# Patient Record
Sex: MALE | Race: OTHER | HISPANIC OR LATINO | URBAN - METROPOLITAN AREA
[De-identification: names, ages, dates, MRNs, and addresses within clinical notes are randomized per-mention and may not be internally consistent; named-entity substitution may affect disease eponyms.]

---

## 2023-06-26 ENCOUNTER — INPATIENT (INPATIENT)
Facility: HOSPITAL | Age: 73
LOS: 20 days | Discharge: HOME | DRG: 216 | End: 2023-07-17
Attending: INTERNAL MEDICINE | Admitting: INTERNAL MEDICINE
Payer: MEDICAID

## 2023-06-26 VITALS
HEIGHT: 61 IN | TEMPERATURE: 99 F | RESPIRATION RATE: 18 BRPM | SYSTOLIC BLOOD PRESSURE: 186 MMHG | DIASTOLIC BLOOD PRESSURE: 103 MMHG | WEIGHT: 132.06 LBS | HEART RATE: 96 BPM | OXYGEN SATURATION: 98 %

## 2023-06-26 DIAGNOSIS — I50.9 HEART FAILURE, UNSPECIFIED: ICD-10-CM

## 2023-06-26 LAB
ALBUMIN SERPL ELPH-MCNC: 3.8 G/DL — SIGNIFICANT CHANGE UP (ref 3.3–5)
ALP SERPL-CCNC: 104 U/L — SIGNIFICANT CHANGE UP (ref 40–120)
ALT FLD-CCNC: 20 U/L — SIGNIFICANT CHANGE UP (ref 10–45)
ANION GAP SERPL CALC-SCNC: 17 MMOL/L — SIGNIFICANT CHANGE UP (ref 5–17)
APTT BLD: 27.2 SEC — LOW (ref 27.5–35.5)
AST SERPL-CCNC: 17 U/L — SIGNIFICANT CHANGE UP (ref 10–40)
BASE EXCESS BLDV CALC-SCNC: 2 MMOL/L — SIGNIFICANT CHANGE UP (ref -2–3)
BASOPHILS # BLD AUTO: 0.04 K/UL — SIGNIFICANT CHANGE UP (ref 0–0.2)
BASOPHILS NFR BLD AUTO: 0.3 % — SIGNIFICANT CHANGE UP (ref 0–2)
BILIRUB SERPL-MCNC: 0.6 MG/DL — SIGNIFICANT CHANGE UP (ref 0.2–1.2)
BUN SERPL-MCNC: 15 MG/DL — SIGNIFICANT CHANGE UP (ref 7–23)
CA-I SERPL-SCNC: 1.22 MMOL/L — SIGNIFICANT CHANGE UP (ref 1.15–1.33)
CALCIUM SERPL-MCNC: 9.8 MG/DL — SIGNIFICANT CHANGE UP (ref 8.4–10.5)
CHLORIDE BLDV-SCNC: 99 MMOL/L — SIGNIFICANT CHANGE UP (ref 96–108)
CHLORIDE SERPL-SCNC: 94 MMOL/L — LOW (ref 96–108)
CO2 BLDV-SCNC: 29 MMOL/L — HIGH (ref 22–26)
CO2 SERPL-SCNC: 24 MMOL/L — SIGNIFICANT CHANGE UP (ref 22–31)
CREAT SERPL-MCNC: 0.73 MG/DL — SIGNIFICANT CHANGE UP (ref 0.5–1.3)
EGFR: 95 ML/MIN/1.73M2 — SIGNIFICANT CHANGE UP
EOSINOPHIL # BLD AUTO: 0.01 K/UL — SIGNIFICANT CHANGE UP (ref 0–0.5)
EOSINOPHIL NFR BLD AUTO: 0.1 % — SIGNIFICANT CHANGE UP (ref 0–6)
GAS PNL BLDV: 131 MMOL/L — LOW (ref 136–145)
GAS PNL BLDV: SIGNIFICANT CHANGE UP
GLUCOSE BLDC GLUCOMTR-MCNC: 241 MG/DL — HIGH (ref 70–99)
GLUCOSE BLDV-MCNC: 409 MG/DL — HIGH (ref 70–99)
GLUCOSE SERPL-MCNC: 385 MG/DL — HIGH (ref 70–99)
HCO3 BLDV-SCNC: 27 MMOL/L — SIGNIFICANT CHANGE UP (ref 22–29)
HCT VFR BLD CALC: 44.7 % — SIGNIFICANT CHANGE UP (ref 39–50)
HCT VFR BLDA CALC: 46 % — SIGNIFICANT CHANGE UP (ref 39–51)
HGB BLD CALC-MCNC: 15.2 G/DL — SIGNIFICANT CHANGE UP (ref 12.6–17.4)
HGB BLD-MCNC: 15.2 G/DL — SIGNIFICANT CHANGE UP (ref 13–17)
IMM GRANULOCYTES NFR BLD AUTO: 0.9 % — SIGNIFICANT CHANGE UP (ref 0–0.9)
INR BLD: 1.12 RATIO — SIGNIFICANT CHANGE UP (ref 0.88–1.16)
LACTATE BLDV-MCNC: 2.4 MMOL/L — HIGH (ref 0.5–2)
LYMPHOCYTES # BLD AUTO: 1.31 K/UL — SIGNIFICANT CHANGE UP (ref 1–3.3)
LYMPHOCYTES # BLD AUTO: 11.4 % — LOW (ref 13–44)
MCHC RBC-ENTMCNC: 30.6 PG — SIGNIFICANT CHANGE UP (ref 27–34)
MCHC RBC-ENTMCNC: 34 GM/DL — SIGNIFICANT CHANGE UP (ref 32–36)
MCV RBC AUTO: 89.9 FL — SIGNIFICANT CHANGE UP (ref 80–100)
MONOCYTES # BLD AUTO: 1.27 K/UL — HIGH (ref 0–0.9)
MONOCYTES NFR BLD AUTO: 11 % — SIGNIFICANT CHANGE UP (ref 2–14)
NEUTROPHILS # BLD AUTO: 8.77 K/UL — HIGH (ref 1.8–7.4)
NEUTROPHILS NFR BLD AUTO: 76.3 % — SIGNIFICANT CHANGE UP (ref 43–77)
NRBC # BLD: 0 /100 WBCS — SIGNIFICANT CHANGE UP (ref 0–0)
NT-PROBNP SERPL-SCNC: 2643 PG/ML — HIGH (ref 0–300)
PCO2 BLDV: 44 MMHG — SIGNIFICANT CHANGE UP (ref 42–55)
PH BLDV: 7.4 — SIGNIFICANT CHANGE UP (ref 7.32–7.43)
PLATELET # BLD AUTO: 292 K/UL — SIGNIFICANT CHANGE UP (ref 150–400)
PO2 BLDV: 35 MMHG — SIGNIFICANT CHANGE UP (ref 25–45)
POTASSIUM BLDV-SCNC: 4 MMOL/L — SIGNIFICANT CHANGE UP (ref 3.5–5.1)
POTASSIUM SERPL-MCNC: 4.2 MMOL/L — SIGNIFICANT CHANGE UP (ref 3.5–5.3)
POTASSIUM SERPL-SCNC: 4.2 MMOL/L — SIGNIFICANT CHANGE UP (ref 3.5–5.3)
PROT SERPL-MCNC: 8 G/DL — SIGNIFICANT CHANGE UP (ref 6–8.3)
PROTHROM AB SERPL-ACNC: 13 SEC — SIGNIFICANT CHANGE UP (ref 10.5–13.4)
RAPID RVP RESULT: SIGNIFICANT CHANGE UP
RBC # BLD: 4.97 M/UL — SIGNIFICANT CHANGE UP (ref 4.2–5.8)
RBC # FLD: 12.8 % — SIGNIFICANT CHANGE UP (ref 10.3–14.5)
SAO2 % BLDV: 59 % — LOW (ref 67–88)
SARS-COV-2 RNA SPEC QL NAA+PROBE: SIGNIFICANT CHANGE UP
SODIUM SERPL-SCNC: 135 MMOL/L — SIGNIFICANT CHANGE UP (ref 135–145)
TROPONIN T, HIGH SENSITIVITY RESULT: 21 NG/L — SIGNIFICANT CHANGE UP (ref 0–51)
TROPONIN T, HIGH SENSITIVITY RESULT: 22 NG/L — SIGNIFICANT CHANGE UP (ref 0–51)
WBC # BLD: 11.5 K/UL — HIGH (ref 3.8–10.5)
WBC # FLD AUTO: 11.5 K/UL — HIGH (ref 3.8–10.5)

## 2023-06-26 PROCEDURE — 99285 EMERGENCY DEPT VISIT HI MDM: CPT

## 2023-06-26 PROCEDURE — 93308 TTE F-UP OR LMTD: CPT | Mod: 26

## 2023-06-26 PROCEDURE — 71045 X-RAY EXAM CHEST 1 VIEW: CPT | Mod: 26

## 2023-06-26 PROCEDURE — 99223 1ST HOSP IP/OBS HIGH 75: CPT | Mod: GC

## 2023-06-26 RX ORDER — INSULIN LISPRO 100/ML
VIAL (ML) SUBCUTANEOUS
Refills: 0 | Status: DISCONTINUED | OUTPATIENT
Start: 2023-06-26 | End: 2023-06-28

## 2023-06-26 RX ORDER — DEXTROSE 50 % IN WATER 50 %
12.5 SYRINGE (ML) INTRAVENOUS ONCE
Refills: 0 | Status: DISCONTINUED | OUTPATIENT
Start: 2023-06-26 | End: 2023-07-17

## 2023-06-26 RX ORDER — ALBUTEROL 90 UG/1
1.25 AEROSOL, METERED ORAL EVERY 6 HOURS
Refills: 0 | Status: DISCONTINUED | OUTPATIENT
Start: 2023-06-26 | End: 2023-06-27

## 2023-06-26 RX ORDER — AZITHROMYCIN 500 MG/1
500 TABLET, FILM COATED ORAL EVERY 24 HOURS
Refills: 0 | Status: DISCONTINUED | OUTPATIENT
Start: 2023-06-27 | End: 2023-06-28

## 2023-06-26 RX ORDER — AMLODIPINE BESYLATE 2.5 MG/1
2.5 TABLET ORAL DAILY
Refills: 0 | Status: DISCONTINUED | OUTPATIENT
Start: 2023-06-26 | End: 2023-06-27

## 2023-06-26 RX ORDER — LOSARTAN POTASSIUM 100 MG/1
1 TABLET, FILM COATED ORAL
Refills: 0 | DISCHARGE

## 2023-06-26 RX ORDER — CEFTRIAXONE 500 MG/1
1000 INJECTION, POWDER, FOR SOLUTION INTRAMUSCULAR; INTRAVENOUS EVERY 24 HOURS
Refills: 0 | Status: COMPLETED | OUTPATIENT
Start: 2023-06-26 | End: 2023-06-30

## 2023-06-26 RX ORDER — ASPIRIN/CALCIUM CARB/MAGNESIUM 324 MG
81 TABLET ORAL DAILY
Refills: 0 | Status: DISCONTINUED | OUTPATIENT
Start: 2023-06-26 | End: 2023-07-17

## 2023-06-26 RX ORDER — SODIUM CHLORIDE 9 MG/ML
1000 INJECTION, SOLUTION INTRAVENOUS
Refills: 0 | Status: DISCONTINUED | OUTPATIENT
Start: 2023-06-26 | End: 2023-07-17

## 2023-06-26 RX ORDER — AZITHROMYCIN 500 MG/1
TABLET, FILM COATED ORAL
Refills: 0 | Status: DISCONTINUED | OUTPATIENT
Start: 2023-06-26 | End: 2023-06-28

## 2023-06-26 RX ORDER — GLUCAGON INJECTION, SOLUTION 0.5 MG/.1ML
1 INJECTION, SOLUTION SUBCUTANEOUS ONCE
Refills: 0 | Status: DISCONTINUED | OUTPATIENT
Start: 2023-06-26 | End: 2023-07-17

## 2023-06-26 RX ORDER — METOPROLOL TARTRATE 50 MG
25 TABLET ORAL
Refills: 0 | Status: DISCONTINUED | OUTPATIENT
Start: 2023-06-26 | End: 2023-06-29

## 2023-06-26 RX ORDER — DEXTROSE 50 % IN WATER 50 %
15 SYRINGE (ML) INTRAVENOUS ONCE
Refills: 0 | Status: DISCONTINUED | OUTPATIENT
Start: 2023-06-26 | End: 2023-07-17

## 2023-06-26 RX ORDER — LOSARTAN POTASSIUM 100 MG/1
50 TABLET, FILM COATED ORAL DAILY
Refills: 0 | Status: DISCONTINUED | OUTPATIENT
Start: 2023-06-26 | End: 2023-06-27

## 2023-06-26 RX ORDER — AZITHROMYCIN 500 MG/1
500 TABLET, FILM COATED ORAL ONCE
Refills: 0 | Status: COMPLETED | OUTPATIENT
Start: 2023-06-26 | End: 2023-06-26

## 2023-06-26 RX ORDER — DEXTROSE 50 % IN WATER 50 %
25 SYRINGE (ML) INTRAVENOUS ONCE
Refills: 0 | Status: DISCONTINUED | OUTPATIENT
Start: 2023-06-26 | End: 2023-07-17

## 2023-06-26 RX ORDER — HEPARIN SODIUM 5000 [USP'U]/ML
5000 INJECTION INTRAVENOUS; SUBCUTANEOUS EVERY 12 HOURS
Refills: 0 | Status: DISCONTINUED | OUTPATIENT
Start: 2023-06-26 | End: 2023-07-17

## 2023-06-26 RX ADMIN — AZITHROMYCIN 255 MILLIGRAM(S): 500 TABLET, FILM COATED ORAL at 22:20

## 2023-06-26 RX ADMIN — LOSARTAN POTASSIUM 50 MILLIGRAM(S): 100 TABLET, FILM COATED ORAL at 18:56

## 2023-06-26 NOTE — CONSULT NOTE ADULT - SUBJECTIVE AND OBJECTIVE BOX
Patient is a 73y old  Male who presents with a chief complaint of     HPI:      PAST MEDICAL & SURGICAL HISTORY:    SOCIAL HISTORY:  Allergies    No Known Allergies    Intolerances        HOME MEDICATIONS:      Vital Signs Last 24 Hrs  T(C): 37.2 (26 Jun 2023 11:39), Max: 37.2 (26 Jun 2023 11:39)  T(F): 98.9 (26 Jun 2023 11:39), Max: 98.9 (26 Jun 2023 11:39)  HR: 102 (26 Jun 2023 11:39) (96 - 102)  BP: 192/99 (26 Jun 2023 11:39) (186/103 - 192/99)  BP(mean): --  RR: 20 (26 Jun 2023 11:39) (18 - 20)  SpO2: 96% (26 Jun 2023 11:39) (96% - 98%)    Parameters below as of 26 Jun 2023 11:39  Patient On (Oxygen Delivery Method): room air      I&O's Summary      PHYSICAL EXAM:  GENERAL: NAD, well-developed  HEAD:  Atraumatic, Normocephalic  EYES: EOMI, conjunctiva and sclera clear  NECK: Supple, No JVD  CHEST/LUNG: Clear to auscultation bilaterally; No wheeze  HEART: Regular rate and rhythm; No murmurs, rubs, or gallops  ABDOMEN: Soft, Nontender, Nondistended; Bowel sounds present  EXTREMITIES:  2+ Peripheral Pulses, No clubbing, cyanosis, or edema  PSYCH: AAOx3  NEUROLOGY: non-focal  SKIN: No rashes or lesions    LABS                        15.2   11.50 )-----------( 292      ( 26 Jun 2023 12:00 )             44.7     06-26    135  |  94<L>  |  15  ----------------------------<  385<H>  4.2   |  24  |  0.73    Ca    9.8      26 Jun 2023 12:00    TPro  8.0  /  Alb  3.8  /  TBili  0.6  /  DBili  x   /  AST  17  /  ALT  20  /  AlkPhos  104  06-26    CAPILLARY BLOOD GLUCOSE        PT/INR - ( 26 Jun 2023 12:00 )   PT: 13.0 sec;   INR: 1.12 ratio         PTT - ( 26 Jun 2023 12:00 )  PTT:27.2 sec          06-26-23 @ 12:00 - VBG - pH: 7.40  | pCO2: 44    | pO2: 35    | Lactate: 2.4        Urinalysis Basic - ( 26 Jun 2023 12:00 )    Color: x / Appearance: x / SG: x / pH: x  Gluc: 385 mg/dL / Ketone: x  / Bili: x / Urobili: x   Blood: x / Protein: x / Nitrite: x   Leuk Esterase: x / RBC: x / WBC x   Sq Epi: x / Non Sq Epi: x / Bacteria: x      MEDICATIONS  (STANDING):    MEDICATIONS  (PRN):      ECG:  ECHO FINDINGS:  RADIOLOGY & ADDITIONAL STUDIES:     Patient is a 73y old  Male who presents with a chief complaint of     HPI:    In the ED:   HR 96, /103 satting 98% Ra  Frequent cough   11.5 < 15.2 > 292 INR 1.12  135/4.2 | 94/24 | 15/0.73 < 385  17/20 | 104 | 0.6 | 8/3.8    ProBNP 2643 trop 21    Cardiology consulted for ST changes c/w LVH in the absence of chest pain.    PAST MEDICAL & SURGICAL HISTORY:    SOCIAL HISTORY:  Allergies  No Known Allergies  Intolerances    HOME MEDICATIONS:    Vital Signs Last 24 Hrs  T(C): 37.2 (26 Jun 2023 11:39), Max: 37.2 (26 Jun 2023 11:39)  T(F): 98.9 (26 Jun 2023 11:39), Max: 98.9 (26 Jun 2023 11:39)  HR: 102 (26 Jun 2023 11:39) (96 - 102)  BP: 192/99 (26 Jun 2023 11:39) (186/103 - 192/99)  BP(mean): --  RR: 20 (26 Jun 2023 11:39) (18 - 20)  SpO2: 96% (26 Jun 2023 11:39) (96% - 98%)    Parameters below as of 26 Jun 2023 11:39  Patient On (Oxygen Delivery Method): room air  I&O's Summary    PHYSICAL EXAM:  GENERAL: NAD, well-developed  HEAD:  Atraumatic, Normocephalic  EYES: EOMI, conjunctiva and sclera clear  NECK: Supple, No JVD  CHEST/LUNG: Clear to auscultation bilaterally; No wheeze  HEART: Regular rate and rhythm; No murmurs, rubs, or gallops  ABDOMEN: Soft, Nontender, Nondistended; Bowel sounds present  EXTREMITIES:  2+ Peripheral Pulses, No clubbing, cyanosis, or edema  PSYCH: AAOx3  NEUROLOGY: non-focal  SKIN: No rashes or lesions    LABS                        15.2   11.50 )-----------( 292      ( 26 Jun 2023 12:00 )             44.7     06-26    135  |  94<L>  |  15  ----------------------------<  385<H>  4.2   |  24  |  0.73    Ca    9.8      26 Jun 2023 12:00    TPro  8.0  /  Alb  3.8  /  TBili  0.6  /  DBili  x   /  AST  17  /  ALT  20  /  AlkPhos  104  06-26    CAPILLARY BLOOD GLUCOSE        PT/INR - ( 26 Jun 2023 12:00 )   PT: 13.0 sec;   INR: 1.12 ratio         PTT - ( 26 Jun 2023 12:00 )  PTT:27.2 sec          06-26-23 @ 12:00 - VBG - pH: 7.40  | pCO2: 44    | pO2: 35    | Lactate: 2.4        Urinalysis Basic - ( 26 Jun 2023 12:00 )    Color: x / Appearance: x / SG: x / pH: x  Gluc: 385 mg/dL / Ketone: x  / Bili: x / Urobili: x   Blood: x / Protein: x / Nitrite: x   Leuk Esterase: x / RBC: x / WBC x   Sq Epi: x / Non Sq Epi: x / Bacteria: x      MEDICATIONS  (STANDING):    MEDICATIONS  (PRN):    ECG: sinus rhythm, LVH by Sokolov Jung criteria TWI asymmetric inferolaterally   Patient is a 73y old  Male who presents with a chief complaint of     HPI: 73M PMH DM, HTN, CAD s/p 3 stents p/w cough productive of green sputum. Interview conducted at bedside with telephone . Pt says the cough started 2 weeks ago. He came from [place] 1 month ago. Has some SOB when coughing, feels like coughing more when laying supine. Denies fever, chills, CP, abdominal pain, urinary symptoms, diarrhea, weight gain or weight loss, LE edema.    In the ED:   HR 96, /103 satting 98% Ra  Frequent cough interrupting interview.  11.5 < 15.2 > 292 INR 1.12  135/4.2 | 94/24 | 15/0.73 < 385  17/20 | 104 | 0.6 | 8/3.8    ProBNP 2643 trop 21  POCUS performed w/ "No clinically significant pericardial effusion.  The heart was hypodynamic.  There was basal-mid anterolateral hypokinesis."    Cardiology consulted for ST changes c/w LVH in the absence of chest pain.    PAST MEDICAL & SURGICAL HISTORY: As above    SOCIAL HISTORY: Denies illicits     Allergies  No Known Allergies  Intolerances      REVIEW OF SYSTEMS:  CONSTITUTIONAL: No weakness, fevers or chills  EYES/ENT: No visual changes;  No dysphagia  NECK: No pain or stiffness  RESPIRATORY: No cough, wheezing, hemoptysis; No shortness of breath  CARDIOVASCULAR: No chest pain or palpitations; No lower extremity edema  GASTROINTESTINAL: No abdominal or epigastric pain. No nausea, vomiting, or hematemesis; No diarrhea or constipation. No melena or hematochezia.  BACK: No back pain  GENITOURINARY: No dysuria, frequency or hematuria  NEUROLOGICAL: No numbness or weakness  SKIN: No itching, burning, rashes, or lesions   All other review of systems is negative unless indicated above.      Vital Signs Last 24 Hrs  T(C): 37.2 (26 Jun 2023 11:39), Max: 37.2 (26 Jun 2023 11:39)  T(F): 98.9 (26 Jun 2023 11:39), Max: 98.9 (26 Jun 2023 11:39)  HR: 102 (26 Jun 2023 11:39) (96 - 102)  BP: 192/99 (26 Jun 2023 11:39) (186/103 - 192/99)  BP(mean): --  RR: 20 (26 Jun 2023 11:39) (18 - 20)  SpO2: 96% (26 Jun 2023 11:39) (96% - 98%)    Parameters below as of 26 Jun 2023 11:39  Patient On (Oxygen Delivery Method): room air  I&O's Summary    PHYSICAL EXAM:  GENERAL: NAD, well-developed  HEAD:  Atraumatic, Normocephalic  EYES: EOMI, conjunctiva and sclera clear  NECK: Supple, No JVD  CHEST/LUNG: Clear to auscultation bilaterally; No wheeze  HEART: Regular rate and rhythm; No murmurs, rubs, or gallops  ABDOMEN: Soft, Nontender, Nondistended; Bowel sounds present  EXTREMITIES:  2+ Peripheral Pulses, No clubbing, cyanosis, or edema  PSYCH: AAOx3  NEUROLOGY: non-focal  SKIN: No rashes or lesions    LABS                        15.2   11.50 )-----------( 292      ( 26 Jun 2023 12:00 )             44.7     06-26    135  |  94<L>  |  15  ----------------------------<  385<H>  4.2   |  24  |  0.73    Ca    9.8      26 Jun 2023 12:00    TPro  8.0  /  Alb  3.8  /  TBili  0.6  /  DBili  x   /  AST  17  /  ALT  20  /  AlkPhos  104  06-26    CAPILLARY BLOOD GLUCOSE        PT/INR - ( 26 Jun 2023 12:00 )   PT: 13.0 sec;   INR: 1.12 ratio         PTT - ( 26 Jun 2023 12:00 )  PTT:27.2 sec          06-26-23 @ 12:00 - VBG - pH: 7.40  | pCO2: 44    | pO2: 35    | Lactate: 2.4        Urinalysis Basic - ( 26 Jun 2023 12:00 )    Color: x / Appearance: x / SG: x / pH: x  Gluc: 385 mg/dL / Ketone: x  / Bili: x / Urobili: x   Blood: x / Protein: x / Nitrite: x   Leuk Esterase: x / RBC: x / WBC x   Sq Epi: x / Non Sq Epi: x / Bacteria: x      MEDICATIONS  (STANDING):    MEDICATIONS  (PRN):    ECG: sinus rhythm, LVH by Piedad UPon criteria TWI asymmetric inferolaterally

## 2023-06-26 NOTE — ED ADULT NURSE REASSESSMENT NOTE - NS ED NURSE REASSESS COMMENT FT1
Report received from RNGuevara. Upon assessment, pt sitting up in stretcher and speaking in full sentences, primarily Irish speaking, son @ bedside. Breathing spontaneously and unlabored, >95% RA. Pt updated on plan of care, admitted to tele, awaiting bed, RTM. Pt maintained on continuous cardiac monitor, NSR. Pt denies current pain or discomfort. Safety and comfort measures maintained- bed in lowest position, locked, and blanket given.

## 2023-06-26 NOTE — CONSULT NOTE ADULT - ASSESSMENT
73M with CAD s/p 3 stents, HTN, T2DM, admitted for pneumonia, cardiology consulted for ST depressions and LVH noted on EKG iso hypertensive urgency.    #ST Depressions  #LVH  #Hypertensive urgency  Pt is currently CP-free, negative trops, low suspicion for ACS at this time. EKG changes more likely explained by LVH iso hypertensive urgency. BP on admission 186/103  -     Recommendations not finalized until discussed with attending    Miky Vitale MD  PGY2 73M with CAD s/p 3 stents, HTN, T2DM, admitted for pneumonia, cardiology consulted for ST depressions and LVH noted on EKG.  This is not ACS.    #ST Depressions  #LVH  #Hypertensive urgency  Pt is currently CP-free, negative trops, low suspicion for ACS at this time. EKG changes more likely explained by LVH iso hypertensive urgency. BP on admission 186/103  - F/u Formal TTE   - Afterload reduction with losartan 50mg daily to start, uptitrate to goal SBP < 130/80  - A1c, lipid panel and TSH for risk stratification   - Management of probable bronchitis/PNA as per primary team

## 2023-06-26 NOTE — ED ADULT NURSE NOTE - NSFALLUNIVINTERV_ED_ALL_ED
Bed/Stretcher in lowest position, wheels locked, appropriate side rails in place/Call bell, personal items and telephone in reach/Instruct patient to call for assistance before getting out of bed/chair/stretcher/Non-slip footwear applied when patient is off stretcher/[place] to call system/Physically safe environment - no spills, clutter or unnecessary equipment/Purposeful proactive rounding/Room/bathroom lighting operational, light cord in reach

## 2023-06-26 NOTE — ED ADULT NURSE NOTE - ED STAT RN HANDOFF DETAILS
Report given to receiving change of shift RN Charity OQUENDO patient is in no acute distress. Patient vital signs stable, plan of care explained.

## 2023-06-26 NOTE — ED PROVIDER NOTE - CLINICAL SUMMARY MEDICAL DECISION MAKING FREE TEXT BOX
73M PMH DM, HTN, CAD s/p 3 stents p/w cough productive of green sputum. DDx includes but not limited to: PNA vs CHF. Plan: blood work, ECG, CXR. Will re-assess. 73M PMH DM, HTN, CAD s/p 3 stents p/w cough x2weeks now productive of green sputum. DDx includes but not limited to: PNA vs CHF. Plan: blood work, ECG, CXR. Will re-assess.    Dr. Araujo (Attending Physician)

## 2023-06-26 NOTE — ED PROVIDER NOTE - PROGRESS NOTE DETAILS
Mandie Swenson M.D. (Resident Physician): ED POCUS showed reduced EF. Trop and BNP elevated. Will admit. Mandie Swenson M.D. (Resident Physician): Discussed with cardiology and medicine. Will admit pt.

## 2023-06-26 NOTE — H&P ADULT - ASSESSMENT
73M with CAD s/p 3 stents, HTN, T2DM, admitted for pneumonia, cardiology consulted for ST depressions and LVH noted on EKG.      ST Depressions    Hypertensive urgency  Pt is currently CP-free, negative trops, low suspicion for ACS at this time. EKG changes more likely explained by LVH iso hypertensive urgency. BP on admission 186/103  - F/u  TTE   - Afterload reduction with losartan 50mg daily to start, uptitrate to goal SBP < 130/80 as per cards  - A1c, lipid panel and TSH   likely pna  bronchitis  abs   nebs  pulm eval   cards f/u   dm  fsg riss

## 2023-06-26 NOTE — H&P ADULT - HISTORY OF PRESENT ILLNESS
73M PMH DM, HTN, CAD s/p 3 stents p/w cough productive of green sputum.    Pt says the cough started 2 weeks ago. He came from [place] 1 month ago.   Has some SOB when coughing, feels like coughing more when laying supine. Denies fever, chills, CP, abdominal pain, urinary symptoms, diarrhea, weight gain or weight loss, LE edema.

## 2023-06-26 NOTE — H&P ADULT - NSHPLABSRESULTS_GEN_ALL_CORE
15.2   11.50 )-----------( 292      ( 26 Jun 2023 12:00 )             44.7       06-26    135  |  94<L>  |  15  ----------------------------<  385<H>  4.2   |  24  |  0.73    Ca    9.8      26 Jun 2023 12:00    TPro  8.0  /  Alb  3.8  /  TBili  0.6  /  DBili  x   /  AST  17  /  ALT  20  /  AlkPhos  104  06-26              Urinalysis Basic - ( 26 Jun 2023 12:00 )    Color: x / Appearance: x / SG: x / pH: x  Gluc: 385 mg/dL / Ketone: x  / Bili: x / Urobili: x   Blood: x / Protein: x / Nitrite: x   Leuk Esterase: x / RBC: x / WBC x   Sq Epi: x / Non Sq Epi: x / Bacteria: x        PT/INR - ( 26 Jun 2023 12:00 )   PT: 13.0 sec;   INR: 1.12 ratio         PTT - ( 26 Jun 2023 12:00 )  PTT:27.2 sec    Lactate Trend            CAPILLARY BLOOD GLUCOSE

## 2023-06-26 NOTE — ED PROVIDER NOTE - PHYSICAL EXAMINATION
PHYSICAL EXAM:  GENERAL: Sitting comfortable in bed, in no acute distress  HENMT: Atraumatic, moist mucous membranes  EYES: Clear bilaterally, PERRL, EOMs intact b/l  HEART: RRR, S1/S2, no murmur  RESPIRATORY: Clear to auscultation bilaterally, no wheezes/rhonchi/rales  ABDOMEN: Soft, nontender, nondistended  EXTREMITIES: No lower extremity edema  NEURO: A&Ox4  SKIN: Skin normal color for race, warm, dry and intact

## 2023-06-26 NOTE — ED PROVIDER NOTE - OBJECTIVE STATEMENT
73M PMH DM, HTN, CAD s/p 3 stents p/w cough productive of green sputum. Pt says the cough started 2 weeks ago. He came from [place] 1 month ago. Has some SOB when coughing. Denies fever, chills, CP, abdominal pain, urinary symptoms, diarrhea, LE edema.

## 2023-06-26 NOTE — ED ADULT NURSE NOTE - OBJECTIVE STATEMENT
72yo M aaox4 h/o DM and HTN, presents ambulatory to ED from home, pt is [place] speaking, RN offered translation services, pt refused, "OK" that primary RN translates,  as per pt 15days ago onset a productive cough ( greenish mucus) w/ associated sob, at this time Pt denies CP, HA, vision changes, n/v/d, fevers chills, abdominal pain, weakness, dizziness, URI symptoms Safety and comfort measures initiated- bed placed in lowest position and side rails raised. Pt oriented to call bell system.

## 2023-06-27 DIAGNOSIS — I16.0 HYPERTENSIVE URGENCY: ICD-10-CM

## 2023-06-27 DIAGNOSIS — J18.9 PNEUMONIA, UNSPECIFIED ORGANISM: ICD-10-CM

## 2023-06-27 LAB
A1C WITH ESTIMATED AVERAGE GLUCOSE RESULT: 10 % — HIGH (ref 4–5.6)
ANION GAP SERPL CALC-SCNC: 16 MMOL/L — SIGNIFICANT CHANGE UP (ref 5–17)
BUN SERPL-MCNC: 12 MG/DL — SIGNIFICANT CHANGE UP (ref 7–23)
CALCIUM SERPL-MCNC: 9.3 MG/DL — SIGNIFICANT CHANGE UP (ref 8.4–10.5)
CHLORIDE SERPL-SCNC: 94 MMOL/L — LOW (ref 96–108)
CHOLEST SERPL-MCNC: 183 MG/DL — SIGNIFICANT CHANGE UP
CO2 SERPL-SCNC: 24 MMOL/L — SIGNIFICANT CHANGE UP (ref 22–31)
CREAT SERPL-MCNC: 0.76 MG/DL — SIGNIFICANT CHANGE UP (ref 0.5–1.3)
EGFR: 94 ML/MIN/1.73M2 — SIGNIFICANT CHANGE UP
ESTIMATED AVERAGE GLUCOSE: 240 MG/DL — HIGH (ref 68–114)
GLUCOSE BLDC GLUCOMTR-MCNC: 131 MG/DL — HIGH (ref 70–99)
GLUCOSE BLDC GLUCOMTR-MCNC: 256 MG/DL — HIGH (ref 70–99)
GLUCOSE BLDC GLUCOMTR-MCNC: 261 MG/DL — HIGH (ref 70–99)
GLUCOSE BLDC GLUCOMTR-MCNC: 261 MG/DL — HIGH (ref 70–99)
GLUCOSE SERPL-MCNC: 281 MG/DL — HIGH (ref 70–99)
HCT VFR BLD CALC: 42.5 % — SIGNIFICANT CHANGE UP (ref 39–50)
HCV AB S/CO SERPL IA: 0.7 S/CO — SIGNIFICANT CHANGE UP (ref 0–0.99)
HCV AB SERPL-IMP: SIGNIFICANT CHANGE UP
HDLC SERPL-MCNC: 43 MG/DL — SIGNIFICANT CHANGE UP
HGB BLD-MCNC: 14.5 G/DL — SIGNIFICANT CHANGE UP (ref 13–17)
LIPID PNL WITH DIRECT LDL SERPL: 114 MG/DL — HIGH
MCHC RBC-ENTMCNC: 30.7 PG — SIGNIFICANT CHANGE UP (ref 27–34)
MCHC RBC-ENTMCNC: 34.1 GM/DL — SIGNIFICANT CHANGE UP (ref 32–36)
MCV RBC AUTO: 89.9 FL — SIGNIFICANT CHANGE UP (ref 80–100)
NON HDL CHOLESTEROL: 141 MG/DL — HIGH
NRBC # BLD: 0 /100 WBCS — SIGNIFICANT CHANGE UP (ref 0–0)
PLATELET # BLD AUTO: 288 K/UL — SIGNIFICANT CHANGE UP (ref 150–400)
POTASSIUM SERPL-MCNC: 4.1 MMOL/L — SIGNIFICANT CHANGE UP (ref 3.5–5.3)
POTASSIUM SERPL-SCNC: 4.1 MMOL/L — SIGNIFICANT CHANGE UP (ref 3.5–5.3)
PROCALCITONIN SERPL-MCNC: 0.09 NG/ML — SIGNIFICANT CHANGE UP (ref 0.02–0.1)
RBC # BLD: 4.73 M/UL — SIGNIFICANT CHANGE UP (ref 4.2–5.8)
RBC # FLD: 12.9 % — SIGNIFICANT CHANGE UP (ref 10.3–14.5)
SODIUM SERPL-SCNC: 134 MMOL/L — LOW (ref 135–145)
TRIGL SERPL-MCNC: 136 MG/DL — SIGNIFICANT CHANGE UP
TSH SERPL-MCNC: 5.56 UIU/ML — HIGH (ref 0.27–4.2)
WBC # BLD: 9.78 K/UL — SIGNIFICANT CHANGE UP (ref 3.8–10.5)
WBC # FLD AUTO: 9.78 K/UL — SIGNIFICANT CHANGE UP (ref 3.8–10.5)

## 2023-06-27 PROCEDURE — 93306 TTE W/DOPPLER COMPLETE: CPT | Mod: 26

## 2023-06-27 PROCEDURE — 99233 SBSQ HOSP IP/OBS HIGH 50: CPT | Mod: GC

## 2023-06-27 PROCEDURE — 71250 CT THORAX DX C-: CPT | Mod: 26

## 2023-06-27 RX ORDER — IPRATROPIUM/ALBUTEROL SULFATE 18-103MCG
3 AEROSOL WITH ADAPTER (GRAM) INHALATION EVERY 6 HOURS
Refills: 0 | Status: DISCONTINUED | OUTPATIENT
Start: 2023-06-27 | End: 2023-07-13

## 2023-06-27 RX ORDER — SACUBITRIL AND VALSARTAN 24; 26 MG/1; MG/1
1 TABLET, FILM COATED ORAL
Refills: 0 | Status: DISCONTINUED | OUTPATIENT
Start: 2023-06-27 | End: 2023-07-14

## 2023-06-27 RX ADMIN — AZITHROMYCIN 255 MILLIGRAM(S): 500 TABLET, FILM COATED ORAL at 22:36

## 2023-06-27 RX ADMIN — HEPARIN SODIUM 5000 UNIT(S): 5000 INJECTION INTRAVENOUS; SUBCUTANEOUS at 17:39

## 2023-06-27 RX ADMIN — Medication 3 MILLILITER(S): at 17:38

## 2023-06-27 RX ADMIN — Medication 6: at 17:53

## 2023-06-27 RX ADMIN — Medication 3 MILLILITER(S): at 22:40

## 2023-06-27 RX ADMIN — SACUBITRIL AND VALSARTAN 1 TABLET(S): 24; 26 TABLET, FILM COATED ORAL at 17:53

## 2023-06-27 RX ADMIN — Medication 3 MILLILITER(S): at 12:05

## 2023-06-27 RX ADMIN — Medication 1200 MILLIGRAM(S): at 17:39

## 2023-06-27 RX ADMIN — Medication 100 MILLIGRAM(S): at 00:20

## 2023-06-27 RX ADMIN — ALBUTEROL 1.25 MILLIGRAM(S): 90 AEROSOL, METERED ORAL at 07:07

## 2023-06-27 RX ADMIN — ALBUTEROL 1.25 MILLIGRAM(S): 90 AEROSOL, METERED ORAL at 00:24

## 2023-06-27 RX ADMIN — HEPARIN SODIUM 5000 UNIT(S): 5000 INJECTION INTRAVENOUS; SUBCUTANEOUS at 07:07

## 2023-06-27 RX ADMIN — Medication 25 MILLIGRAM(S): at 17:39

## 2023-06-27 RX ADMIN — CEFTRIAXONE 100 MILLIGRAM(S): 500 INJECTION, POWDER, FOR SOLUTION INTRAMUSCULAR; INTRAVENOUS at 00:45

## 2023-06-27 RX ADMIN — Medication 6: at 09:29

## 2023-06-27 RX ADMIN — Medication 25 MILLIGRAM(S): at 07:06

## 2023-06-27 RX ADMIN — LOSARTAN POTASSIUM 50 MILLIGRAM(S): 100 TABLET, FILM COATED ORAL at 07:06

## 2023-06-27 RX ADMIN — AMLODIPINE BESYLATE 2.5 MILLIGRAM(S): 2.5 TABLET ORAL at 07:07

## 2023-06-27 RX ADMIN — Medication 6: at 13:23

## 2023-06-27 RX ADMIN — Medication 81 MILLIGRAM(S): at 12:04

## 2023-06-27 NOTE — PROGRESS NOTE ADULT - SUBJECTIVE AND OBJECTIVE BOX
Gerardo Sotelo MD  Cardiology Fellow  [phone]  All Cardiology service information can be found 24/7 on [email or web address], password: [email or web address]    Patient seen and examined at bedside.  HR 150s-180s/80s-90s satting 93-95% Ra  Labs this morning pending  STill with ongoing cough. Nontoxic appearing at bedside  TTE pending    Review Of Systems: No chest pain, shortness of breath, or palpitations            Current Meds:  albuterol   0.042% 1.25 milliGRAM(s) Nebulizer every 6 hours  amLODIPine   Tablet 2.5 milliGRAM(s) Oral daily  aspirin enteric coated 81 milliGRAM(s) Oral daily  azithromycin  IVPB      azithromycin  IVPB 500 milliGRAM(s) IV Intermittent every 24 hours  cefTRIAXone   IVPB 1000 milliGRAM(s) IV Intermittent every 24 hours  dextrose 5%. 1000 milliLiter(s) IV Continuous <Continuous>  dextrose 5%. 1000 milliLiter(s) IV Continuous <Continuous>  dextrose 50% Injectable 12.5 Gram(s) IV Push once  dextrose 50% Injectable 25 Gram(s) IV Push once  dextrose 50% Injectable 25 Gram(s) IV Push once  dextrose Oral Gel 15 Gram(s) Oral once PRN  glucagon  Injectable 1 milliGRAM(s) IntraMuscular once  heparin   Injectable 5000 Unit(s) SubCutaneous every 12 hours  insulin lispro (ADMELOG) corrective regimen sliding scale   SubCutaneous three times a day before meals  losartan 50 milliGRAM(s) Oral daily  metoprolol tartrate 25 milliGRAM(s) Oral two times a day      Vitals:  T(F): 98.5 (06-27), Max: 99.7 (06-26)  HR: 86 (06-27) (78 - 102)  BP: 154/84 (06-27) (154/84 - 193/96)  RR: 19 (06-27)  SpO2: 94% (06-27)  I&O's Summary    26 Jun 2023 07:01  -  27 Jun 2023 07:00  --------------------------------------------------------  IN: 100 mL / OUT: 0 mL / NET: 100 mL    Physical Exam:  Appearance: No acute distress; well appearing. +Cough  Eyes: PERRL, EOMI, pink conjunctiva  HEENT: Normal oral mucosa  Cardiovascular: RRR, S1, S2, no murmurs, rubs, or gallops; no edema; no JVD  Respiratory: Clear to auscultation bilaterally  Gastrointestinal: soft, non-tender, non-distended with normal bowel sounds  Musculoskeletal: No clubbing; no joint deformity   Neurologic: Non-focal  Lymphatic: No lymphadenopathy  Psychiatry: AAOx3, mood & affect appropriate  Skin: No rashes, ecchymoses, or cyanosis                          15.2   11.50 )-----------( 292      ( 26 Jun 2023 12:00 )             44.7     06-26    135  |  94<L>  |  15  ----------------------------<  385<H>  4.2   |  24  |  0.73    Ca    9.8      26 Jun 2023 12:00    TPro  8.0  /  Alb  3.8  /  TBili  0.6  /  DBili  x   /  AST  17  /  ALT  20  /  AlkPhos  104  06-26    PT/INR - ( 26 Jun 2023 12:00 )   PT: 13.0 sec;   INR: 1.12 ratio         PTT - ( 26 Jun 2023 12:00 )  PTT:27.2 sec  CARDIAC MARKERS ( 26 Jun 2023 19:47 )  22 ng/L / x     / x     / x     / x     / x      CARDIAC MARKERS ( 26 Jun 2023 12:00 )  21 ng/L / x     / x     / x     / x     / x          ECG: sinus rhythm, LVH by Sokolov Jung criteria TWI asymmetric inferolaterally     Gerardo Sotelo MD  Cardiology Fellow  [phone]  All Cardiology service information can be found 24/7 on [email or web address], password: [email or web address]    Patient seen and examined at bedside.  HR 150s-180s/80s-90s satting 93-95% Ra. Not on tele  Labs this morning pending  STill with ongoing cough. Nontoxic appearing at bedside.   TTE pending    Review Of Systems: No chest pain, shortness of breath, or palpitations            Current Meds:  albuterol   0.042% 1.25 milliGRAM(s) Nebulizer every 6 hours  amLODIPine   Tablet 2.5 milliGRAM(s) Oral daily  aspirin enteric coated 81 milliGRAM(s) Oral daily  azithromycin  IVPB      azithromycin  IVPB 500 milliGRAM(s) IV Intermittent every 24 hours  cefTRIAXone   IVPB 1000 milliGRAM(s) IV Intermittent every 24 hours  dextrose 5%. 1000 milliLiter(s) IV Continuous <Continuous>  dextrose 5%. 1000 milliLiter(s) IV Continuous <Continuous>  dextrose 50% Injectable 12.5 Gram(s) IV Push once  dextrose 50% Injectable 25 Gram(s) IV Push once  dextrose 50% Injectable 25 Gram(s) IV Push once  dextrose Oral Gel 15 Gram(s) Oral once PRN  glucagon  Injectable 1 milliGRAM(s) IntraMuscular once  heparin   Injectable 5000 Unit(s) SubCutaneous every 12 hours  insulin lispro (ADMELOG) corrective regimen sliding scale   SubCutaneous three times a day before meals  losartan 50 milliGRAM(s) Oral daily  metoprolol tartrate 25 milliGRAM(s) Oral two times a day      Vitals:  T(F): 98.5 (06-27), Max: 99.7 (06-26)  HR: 86 (06-27) (78 - 102)  BP: 154/84 (06-27) (154/84 - 193/96)  RR: 19 (06-27)  SpO2: 94% (06-27)  I&O's Summary    26 Jun 2023 07:01  -  27 Jun 2023 07:00  --------------------------------------------------------  IN: 100 mL / OUT: 0 mL / NET: 100 mL    Physical Exam:  Appearance: No acute distress; well appearing. +Cough  Eyes: PERRL, EOMI, pink conjunctiva  HEENT: Normal oral mucosa  Cardiovascular: RRR, S1, S2, no murmurs, rubs, or gallops; no edema; no JVD  Respiratory: Clear to auscultation bilaterally  Gastrointestinal: soft, non-tender, non-distended with normal bowel sounds  Musculoskeletal: No clubbing; no joint deformity   Neurologic: Non-focal  Lymphatic: No lymphadenopathy  Psychiatry: AAOx3, mood & affect appropriate  Skin: No rashes, ecchymoses, or cyanosis                          15.2   11.50 )-----------( 292      ( 26 Jun 2023 12:00 )             44.7     06-26    135  |  94<L>  |  15  ----------------------------<  385<H>  4.2   |  24  |  0.73    Ca    9.8      26 Jun 2023 12:00    TPro  8.0  /  Alb  3.8  /  TBili  0.6  /  DBili  x   /  AST  17  /  ALT  20  /  AlkPhos  104  06-26    PT/INR - ( 26 Jun 2023 12:00 )   PT: 13.0 sec;   INR: 1.12 ratio         PTT - ( 26 Jun 2023 12:00 )  PTT:27.2 sec  CARDIAC MARKERS ( 26 Jun 2023 19:47 )  22 ng/L / x     / x     / x     / x     / x      CARDIAC MARKERS ( 26 Jun 2023 12:00 )  21 ng/L / x     / x     / x     / x     / x          ECG: sinus rhythm, LVH by Sokolov Jung criteria TWI asymmetric inferolaterally

## 2023-06-27 NOTE — PROGRESS NOTE ADULT - SUBJECTIVE AND OBJECTIVE BOX
DATE OF SERVICE: 06-27-23 @ 14:00  CHIEF COMPLAINT:Patient is a 73y old  Male who presents with a chief complaint of pneumonia (26 Jun 2023 14:31)    	        PAST MEDICAL & SURGICAL HISTORY:  HTN (hypertension)      DM (diabetes mellitus)                NECK: No pain or stiffness  RESPIRATORY: cough / sob  CARDIOVASCULAR: No chest pain, palpitations, passing out, dizziness, or leg swelling  GASTROINTESTINAL: No abdominal or epigastric pain. No nausea, vomiting, or hematemesis;  GENITOURINARY: No dysuria, frequency, hematuria, or incontinence  NEUROLOGICAL: No headaches,     Medications:  MEDICATIONS  (STANDING):  albuterol/ipratropium for Nebulization 3 milliLiter(s) Nebulizer every 6 hours  amLODIPine   Tablet 2.5 milliGRAM(s) Oral daily  aspirin enteric coated 81 milliGRAM(s) Oral daily  azithromycin  IVPB 500 milliGRAM(s) IV Intermittent every 24 hours  azithromycin  IVPB      cefTRIAXone   IVPB 1000 milliGRAM(s) IV Intermittent every 24 hours  dextrose 5%. 1000 milliLiter(s) (50 mL/Hr) IV Continuous <Continuous>  dextrose 5%. 1000 milliLiter(s) (100 mL/Hr) IV Continuous <Continuous>  dextrose 50% Injectable 25 Gram(s) IV Push once  dextrose 50% Injectable 25 Gram(s) IV Push once  dextrose 50% Injectable 12.5 Gram(s) IV Push once  glucagon  Injectable 1 milliGRAM(s) IntraMuscular once  guaiFENesin ER 1200 milliGRAM(s) Oral every 12 hours  heparin   Injectable 5000 Unit(s) SubCutaneous every 12 hours  insulin lispro (ADMELOG) corrective regimen sliding scale   SubCutaneous three times a day before meals  losartan 50 milliGRAM(s) Oral daily  metoprolol tartrate 25 milliGRAM(s) Oral two times a day    MEDICATIONS  (PRN):  dextrose Oral Gel 15 Gram(s) Oral once PRN Blood Glucose LESS THAN 70 milliGRAM(s)/deciliter    	    PHYSICAL EXAM:  T(C): 36.9 (06-27-23 @ 13:56), Max: 37.6 (06-26-23 @ 15:03)  HR: 80 (06-27-23 @ 13:56) (78 - 86)  BP: 147/82 (06-27-23 @ 13:56) (146/77 - 193/96)  RR: 18 (06-27-23 @ 13:56) (18 - 22)  SpO2: 96% (06-27-23 @ 13:56) (93% - 97%)  Wt(kg): --  I&O's Summary    26 Jun 2023 07:01  -  27 Jun 2023 07:00  --------------------------------------------------------  IN: 450 mL / OUT: 0 mL / NET: 450 mL    27 Jun 2023 07:01  -  27 Jun 2023 14:00  --------------------------------------------------------  IN: 360 mL / OUT: 0 mL / NET: 360 mL        Appearance: Normal	  HEENT:   Normal oral mucosa, PERRL, EOMI	  Lymphatic: No lymphadenopathy  Cardiovascular: Normal S1 S2, No JVD, No murmurs, No edema  Respiratory: dec bs   Gastrointestinal:  Soft, Non-tender, + BS	  Skin: No rashes, No ecchymoses, No cyanosis	  Neurologic: Non-focal  Extremities: Normal range of motion, No clubbing, cyanosis or edema  Vascular: Peripheral pulses palpable 2+ bilaterally    TELEMETRY: 	    ECG:  	  RADIOLOGY:  OTHER: 	  	  LABS:	 	    CARDIAC MARKERS:                                14.5   9.78  )-----------( 288      ( 27 Jun 2023 07:41 )             42.5     06-27    134<L>  |  94<L>  |  12  ----------------------------<  281<H>  4.1   |  24  |  0.76    Ca    9.3      27 Jun 2023 07:41    TPro  8.0  /  Alb  3.8  /  TBili  0.6  /  DBili  x   /  AST  17  /  ALT  20  /  AlkPhos  104  06-26    proBNP:   Lipid Profile:   HgA1c:   TSH:

## 2023-06-27 NOTE — CONSULT NOTE ADULT - SUBJECTIVE AND OBJECTIVE BOX
PULMONARY CONSULT    HPI: 72 y/o mostly Macedonian speaking M with PMH of DM, HTN, CAD s/p 3 stents.  #[id number]. Presents with cough for the past 2 weeks. Traveled from [facility] about 1 month ago. Initially reported to have greenish sputum production, now states has no sputum production. CXR with small L basilar opacity. Also noted to be in hypertensive urgency in ED. Denies fevers, chills, SOB, CP, pleuritic CP.       PAST MEDICAL & SURGICAL HISTORY:  HTN (hypertension)  DM (diabetes mellitus)        Allergies  No Known Allergies      FAMILY HISTORY: non contributory     Social history: never a smoker     Review of Systems:  CONSTITUTIONAL: No fever, chills, or fatigue  EYES: No eye pain, visual disturbances, or discharge  ENMT:  No difficulty hearing, tinnitus, vertigo; No sinus or throat pain  NECK: No pain or stiffness  RESPIRATORY: Per above  CARDIOVASCULAR: Per above   GASTROINTESTINAL: No abdominal or epigastric pain. No nausea, vomiting, or hematemesis; No diarrhea or constipation. No melena or hematochezia.  GENITOURINARY: No dysuria, frequency, hematuria, or incontinence  NEUROLOGICAL: No headaches, memory loss, loss of strength, numbness, or tremors  SKIN: No itching, burning, rashes, or lesions   MUSCULOSKELETAL: No joint pain or swelling; No muscle, back, or extremity pain  PSYCHIATRIC: No depression, anxiety, mood swings, or difficulty sleeping      Medications:  MEDICATIONS  (STANDING):  albuterol   0.042% 1.25 milliGRAM(s) Nebulizer every 6 hours  amLODIPine   Tablet 2.5 milliGRAM(s) Oral daily  aspirin enteric coated 81 milliGRAM(s) Oral daily  azithromycin  IVPB      azithromycin  IVPB 500 milliGRAM(s) IV Intermittent every 24 hours  cefTRIAXone   IVPB 1000 milliGRAM(s) IV Intermittent every 24 hours  dextrose 5%. 1000 milliLiter(s) (50 mL/Hr) IV Continuous <Continuous>  dextrose 5%. 1000 milliLiter(s) (100 mL/Hr) IV Continuous <Continuous>  dextrose 50% Injectable 25 Gram(s) IV Push once  dextrose 50% Injectable 25 Gram(s) IV Push once  dextrose 50% Injectable 12.5 Gram(s) IV Push once  glucagon  Injectable 1 milliGRAM(s) IntraMuscular once  heparin   Injectable 5000 Unit(s) SubCutaneous every 12 hours  insulin lispro (ADMELOG) corrective regimen sliding scale   SubCutaneous three times a day before meals  losartan 50 milliGRAM(s) Oral daily  metoprolol tartrate 25 milliGRAM(s) Oral two times a day    MEDICATIONS  (PRN):  dextrose Oral Gel 15 Gram(s) Oral once PRN Blood Glucose LESS THAN 70 milliGRAM(s)/deciliter            Vital Signs Last 24 Hrs  T(C): 36.9 (27 Jun 2023 01:20), Max: 37.6 (26 Jun 2023 15:03)  T(F): 98.5 (27 Jun 2023 01:20), Max: 99.7 (26 Jun 2023 15:03)  HR: 86 (27 Jun 2023 01:20) (78 - 102)  BP: 154/84 (27 Jun 2023 01:20) (154/84 - 193/96)  BP(mean): 123 (26 Jun 2023 19:15) (123 - 123)  RR: 19 (27 Jun 2023 01:20) (18 - 22)  SpO2: 94% (27 Jun 2023 01:20) (93% - 98%)    Parameters below as of 27 Jun 2023 01:20  Patient On (Oxygen Delivery Method): room air          VBG pH 7.40 06-26 @ 12:00  VBG pCO2 44 06-26 @ 12:00  VBG O2 sat 59.0 06-26 @ 12:00  VBG lactate 2.4 06-26 @ 12:00        06-26 @ 07:01  -  06-27 @ 07:00  --------------------------------------------------------  IN: 100 mL / OUT: 0 mL / NET: 100 mL          LABS:                        14.5   9.78  )-----------( 288      ( 27 Jun 2023 07:41 )             42.5     06-27    134<L>  |  94<L>  |  12  ----------------------------<  281<H>  4.1   |  24  |  0.76    Ca    9.3      27 Jun 2023 07:41    TPro  8.0  /  Alb  3.8  /  TBili  0.6  /  DBili  x   /  AST  17  /  ALT  20  /  AlkPhos  104  06-26          CAPILLARY BLOOD GLUCOSE      POCT Blood Glucose.: 241 mg/dL (26 Jun 2023 22:29)    PT/INR - ( 26 Jun 2023 12:00 )   PT: 13.0 sec;   INR: 1.12 ratio         PTT - ( 26 Jun 2023 12:00 )  PTT:27.2 sec  Urinalysis Basic - ( 27 Jun 2023 07:41 )    Color: x / Appearance: x / SG: x / pH: x  Gluc: 281 mg/dL / Ketone: x  / Bili: x / Urobili: x   Blood: x / Protein: x / Nitrite: x   Leuk Esterase: x / RBC: x / WBC x   Sq Epi: x / Non Sq Epi: x / Bacteria: x            Physical Examination:    General: No acute distress.      HEENT: Pupils equal, reactive to light.  Symmetric.    PULM: rhonchi L base    CVS: S1, S2    ABD: Soft, nondistended, nontender, normoactive bowel sounds, no masses    EXT: No edema, nontender    SKIN: Warm and well perfused, no rashes noted.    NEURO: Alert, oriented, interactive, nonfocal        RADIOLOGY REVIEWED  CXR: small LLL opacity

## 2023-06-27 NOTE — PROGRESS NOTE ADULT - ASSESSMENT
73M with CAD s/p 3 stents, HTN, T2DM, admitted for pneumonia, cardiology consulted for ST depressions and LVH noted on EKG.      ST Depressions    Hypertensive urgency  improved    - F/u  TTE   c/w meds  - A1c, lipid panel and TSH   likely pna  bronchitis  abs   nebs  pulm eval  noted  cards f/u noted  dm  fsg riss

## 2023-06-27 NOTE — CONSULT NOTE ADULT - ASSESSMENT
74 y/o mostly Swedish speaking M with PMH of DM, HTN, CAD s/p 3 stents. Presents with cough for the past 2 weeks. CXR with small L basilar opacity. Also noted to be in hypertensive urgency in ED.

## 2023-06-27 NOTE — PROGRESS NOTE ADULT - ASSESSMENT
73M with CAD s/p 3 stents, HTN, T2DM, admitted for pneumonia, cardiology consulted for ST depressions and LVH noted on EKG.  This is not ACS.    #ST Depressions  #LVH  #Hypertensive urgency  Pt is currently CP-free, negative trops, low suspicion for ACS at this time. EKG changes more likely explained by LVH iso hypertensive urgency. BP on admission 186/103  - F/u Formal TTE. Further medication recommendations to follow if rEF   - Afterload reduction with losartan 50mg daily to start. Primary team has added amlodipine 2.5mg, would further uptitrate to goal < 130/80  - A1c, lipid panel and TSH for risk stratification   - Management of probable bronchitis/PNA as per primary team   73M with CAD s/p 3 stents, HTN, T2DM, admitted for pneumonia, cardiology consulted for ST depressions and LVH noted on EKG.  This is not ACS.    #ST Depressions  #LVH  #Hypertensive urgency  Pt is currently CP-free, negative trops, low suspicion for ACS. EKG changes more likely explained by LVH iso hypertensive urgency. BP on admission 186/103  - F/u Formal TTE. Further medication recommendations to follow if rEF   - Afterload reduction with losartan 50mg daily to start. Primary team has added amlodipine 2.5mg, would further uptitrate to goal < 130/80  - A1c, lipid panel and TSH for risk stratification   - Management of probable bronchitis/PNA as per primary team   73M with CAD s/p 3 stents, HTN, T2DM, admitted for pneumonia, cardiology consulted for ST depressions and LVH noted on EKG.  This is not ACS.    #ST Depressions  #LVH  #Hypertensive urgency  Pt is currently CP-free, negative trops, low suspicion for ACS. EKG changes more likely explained by LVH iso hypertensive urgency. BP on admission 186/103  - F/u Formal TTE. Further medication recommendations to follow if rEF   - Afterload reduction with losartan 50mg daily to start. Primary team has added amlodipine 2.5mg, would further uptitrate to goal < 130/80  - A1c, lipid panel and TSH for risk stratification   - Management of probable bronchitis/PNA as per primary team    Echo reviewed; down EF. Plan for ischemic eval with [facility] tomorrow. Patient is amenable

## 2023-06-27 NOTE — PATIENT PROFILE ADULT - FALL HARM RISK - UNIVERSAL INTERVENTIONS
Bed in lowest position, wheels locked, appropriate side rails in place/Call bell, personal items and telephone in reach/Instruct patient to call for assistance before getting out of bed or chair/Non-slip footwear when patient is out of bed/[place] to call system/Physically safe environment - no spills, clutter or unnecessary equipment/Purposeful Proactive Rounding/Room/bathroom lighting operational, light cord in reach

## 2023-06-28 DIAGNOSIS — J20.9 ACUTE BRONCHITIS, UNSPECIFIED: ICD-10-CM

## 2023-06-28 DIAGNOSIS — E11.9 TYPE 2 DIABETES MELLITUS WITHOUT COMPLICATIONS: ICD-10-CM

## 2023-06-28 DIAGNOSIS — I10 ESSENTIAL (PRIMARY) HYPERTENSION: ICD-10-CM

## 2023-06-28 DIAGNOSIS — R91.1 SOLITARY PULMONARY NODULE: ICD-10-CM

## 2023-06-28 DIAGNOSIS — E78.5 HYPERLIPIDEMIA, UNSPECIFIED: ICD-10-CM

## 2023-06-28 DIAGNOSIS — I50.9 HEART FAILURE, UNSPECIFIED: ICD-10-CM

## 2023-06-28 LAB
ANION GAP SERPL CALC-SCNC: 15 MMOL/L — SIGNIFICANT CHANGE UP (ref 5–17)
BUN SERPL-MCNC: 19 MG/DL — SIGNIFICANT CHANGE UP (ref 7–23)
CALCIUM SERPL-MCNC: 9.4 MG/DL — SIGNIFICANT CHANGE UP (ref 8.4–10.5)
CHLORIDE SERPL-SCNC: 96 MMOL/L — SIGNIFICANT CHANGE UP (ref 96–108)
CO2 SERPL-SCNC: 23 MMOL/L — SIGNIFICANT CHANGE UP (ref 22–31)
CREAT SERPL-MCNC: 0.78 MG/DL — SIGNIFICANT CHANGE UP (ref 0.5–1.3)
EGFR: 93 ML/MIN/1.73M2 — SIGNIFICANT CHANGE UP
GLUCOSE BLDC GLUCOMTR-MCNC: 152 MG/DL — HIGH (ref 70–99)
GLUCOSE BLDC GLUCOMTR-MCNC: 200 MG/DL — HIGH (ref 70–99)
GLUCOSE BLDC GLUCOMTR-MCNC: 237 MG/DL — HIGH (ref 70–99)
GLUCOSE BLDC GLUCOMTR-MCNC: 252 MG/DL — HIGH (ref 70–99)
GLUCOSE SERPL-MCNC: 243 MG/DL — HIGH (ref 70–99)
HCT VFR BLD CALC: 39.8 % — SIGNIFICANT CHANGE UP (ref 39–50)
HGB BLD-MCNC: 13.9 G/DL — SIGNIFICANT CHANGE UP (ref 13–17)
MCHC RBC-ENTMCNC: 31 PG — SIGNIFICANT CHANGE UP (ref 27–34)
MCHC RBC-ENTMCNC: 34.9 GM/DL — SIGNIFICANT CHANGE UP (ref 32–36)
MCV RBC AUTO: 88.8 FL — SIGNIFICANT CHANGE UP (ref 80–100)
NRBC # BLD: 0 /100 WBCS — SIGNIFICANT CHANGE UP (ref 0–0)
PLATELET # BLD AUTO: 297 K/UL — SIGNIFICANT CHANGE UP (ref 150–400)
POTASSIUM SERPL-MCNC: 3.9 MMOL/L — SIGNIFICANT CHANGE UP (ref 3.5–5.3)
POTASSIUM SERPL-SCNC: 3.9 MMOL/L — SIGNIFICANT CHANGE UP (ref 3.5–5.3)
RBC # BLD: 4.48 M/UL — SIGNIFICANT CHANGE UP (ref 4.2–5.8)
RBC # FLD: 12.8 % — SIGNIFICANT CHANGE UP (ref 10.3–14.5)
SODIUM SERPL-SCNC: 134 MMOL/L — LOW (ref 135–145)
WBC # BLD: 8.14 K/UL — SIGNIFICANT CHANGE UP (ref 3.8–10.5)
WBC # FLD AUTO: 8.14 K/UL — SIGNIFICANT CHANGE UP (ref 3.8–10.5)

## 2023-06-28 PROCEDURE — 99254 IP/OBS CNSLTJ NEW/EST MOD 60: CPT | Mod: GC

## 2023-06-28 PROCEDURE — 99233 SBSQ HOSP IP/OBS HIGH 50: CPT

## 2023-06-28 RX ORDER — INSULIN LISPRO 100/ML
VIAL (ML) SUBCUTANEOUS AT BEDTIME
Refills: 0 | Status: DISCONTINUED | OUTPATIENT
Start: 2023-06-28 | End: 2023-07-17

## 2023-06-28 RX ORDER — INSULIN GLARGINE 100 [IU]/ML
15 INJECTION, SOLUTION SUBCUTANEOUS AT BEDTIME
Refills: 0 | Status: DISCONTINUED | OUTPATIENT
Start: 2023-06-28 | End: 2023-06-29

## 2023-06-28 RX ORDER — INSULIN LISPRO 100/ML
5 VIAL (ML) SUBCUTANEOUS
Refills: 0 | Status: DISCONTINUED | OUTPATIENT
Start: 2023-06-28 | End: 2023-06-29

## 2023-06-28 RX ORDER — INSULIN LISPRO 100/ML
VIAL (ML) SUBCUTANEOUS
Refills: 0 | Status: DISCONTINUED | OUTPATIENT
Start: 2023-06-28 | End: 2023-07-17

## 2023-06-28 RX ADMIN — Medication 5 UNIT(S): at 18:07

## 2023-06-28 RX ADMIN — CEFTRIAXONE 100 MILLIGRAM(S): 500 INJECTION, POWDER, FOR SOLUTION INTRAMUSCULAR; INTRAVENOUS at 23:39

## 2023-06-28 RX ADMIN — Medication 6: at 14:11

## 2023-06-28 RX ADMIN — Medication 1200 MILLIGRAM(S): at 18:05

## 2023-06-28 RX ADMIN — Medication 25 MILLIGRAM(S): at 05:29

## 2023-06-28 RX ADMIN — Medication 1200 MILLIGRAM(S): at 05:30

## 2023-06-28 RX ADMIN — Medication 25 MILLIGRAM(S): at 18:05

## 2023-06-28 RX ADMIN — INSULIN GLARGINE 15 UNIT(S): 100 INJECTION, SOLUTION SUBCUTANEOUS at 22:01

## 2023-06-28 RX ADMIN — Medication 4: at 09:07

## 2023-06-28 RX ADMIN — Medication 81 MILLIGRAM(S): at 12:05

## 2023-06-28 RX ADMIN — HEPARIN SODIUM 5000 UNIT(S): 5000 INJECTION INTRAVENOUS; SUBCUTANEOUS at 18:04

## 2023-06-28 RX ADMIN — SACUBITRIL AND VALSARTAN 1 TABLET(S): 24; 26 TABLET, FILM COATED ORAL at 18:05

## 2023-06-28 RX ADMIN — HEPARIN SODIUM 5000 UNIT(S): 5000 INJECTION INTRAVENOUS; SUBCUTANEOUS at 05:29

## 2023-06-28 RX ADMIN — Medication 3 MILLILITER(S): at 18:10

## 2023-06-28 RX ADMIN — Medication 3 MILLILITER(S): at 23:40

## 2023-06-28 RX ADMIN — SACUBITRIL AND VALSARTAN 1 TABLET(S): 24; 26 TABLET, FILM COATED ORAL at 05:30

## 2023-06-28 RX ADMIN — Medication 1: at 18:04

## 2023-06-28 RX ADMIN — Medication 3 MILLILITER(S): at 05:30

## 2023-06-28 RX ADMIN — CEFTRIAXONE 100 MILLIGRAM(S): 500 INJECTION, POWDER, FOR SOLUTION INTRAMUSCULAR; INTRAVENOUS at 00:11

## 2023-06-28 RX ADMIN — Medication 3 MILLILITER(S): at 12:12

## 2023-06-28 NOTE — CONSULT NOTE ADULT - ASSESSMENT
This is a 72 yo M /w a PMH of DM2, HTN, CAD /w 3 stents presents with pneumonia. Patient also found to have EF of 30% as well as an HbA1c of 10%. Endocrinology consulted for diabetes management.    #DM2 /w HbA1c 10%  -start lantus 15 units nightly  -start admelog 5 units before meals  -low admelog correction scale before meals and before bedtime  -carb consistent diet  -FSG before meals and before bedtime with goal -180  -RD consult  -hypoglycemia protocol in place    #Discharge  -will need to clarify home regimen with patient - he is getting information from his family about some other medications he has at home  -ideally would optimize metformin to 1000mg BID and add on an SGLT2i (jardiance 25mg or farxiga 10mg daily) for better glycemic control  -if patient not able to get SGLT2i due to cost, can discuss with cardiology if they have any options available given the low EF. If that does not work can try organizing through the [facility].    #HTN  -c/w entersto  -can continue amlodipine 2.5mg daily  -BP goal <130/80    #HLD  -patient should be started on a high intensity statin given his CAD and HFrEF    Case discussed with Dr. Ifeanyi Payan MD  Endocrine Fellow  Can be reached via teams. For follow up questions, discharge recommendations, or new consults, please call answering service at [phone] (weekdays); [phone] (nights/weekends)

## 2023-06-28 NOTE — CONSULT NOTE ADULT - SUBJECTIVE AND OBJECTIVE BOX
HPI:  73M PMH DM, HTN, CAD s/p 3 stents p/w cough productive of green sputum.    Pt says the cough started 2 weeks ago. He came from [place] 1 month ago.   Has some SOB when coughing, feels like coughing more when laying supine. Denies fever, chills, CP, abdominal pain, urinary symptoms, diarrhea, weight gain or weight loss, LE edema. (2023 21:03)       [id number]    Consulted for: DM2  This is a 74 yo M /w a PMH of DM2, HTN, CAD /w 3 stents presents with pneumonia. Patient also found to have EF of 30% as well as an HbA1c of 10%. Endocrinology consulted for diabetes management.    Patient reports a history of DM2 for 3 years. He follows with a PCP back in [place]. He spends a few months out of the  year in the [place] with family and most of the year in [place]. Patient states he is here now until .  He reports that he currently takes metformin 500mg daily for his diabetes. He may take other medications but he does not recall their names  He has never seen an ophthalmologist but denies vision changes.   He has tingling and numbness in his feet bilaterally  He does not know about urine albumin/creatinine ratio    Patient checks his FSG at home 3 times per day including between 2 and 3 AM as per his PCP's instructions    In the hospital the patient has been managed with correction scale insulin only. FSg remain in 200-250s    PAST MEDICAL & SURGICAL HISTORY:  HTN (hypertension)      DM (diabetes mellitus)          FAMILY HISTORY: no history of DM2      Social History: lives most of the year in [place].    Home Medications:  amLODIPine 2.5 mg oral tablet: 1 tab(s) orally once a day (2023 17:58)  aspirin 81 mg oral delayed release tablet: 1 tab(s) orally once a day (2023 17:58)  losartan 100 mg oral tablet: 1 tab(s) orally once a day (2023 17:58)  metFORMIN 850 mg oral tablet: 1 tab(s) orally 2 times a day (2023 17:58)  metoprolol 50m tablet orally once a day (2023 17:58)      MEDICATIONS  (STANDING):  albuterol/ipratropium for Nebulization 3 milliLiter(s) Nebulizer every 6 hours  aspirin enteric coated 81 milliGRAM(s) Oral daily  azithromycin  IVPB      azithromycin  IVPB 500 milliGRAM(s) IV Intermittent every 24 hours  cefTRIAXone   IVPB 1000 milliGRAM(s) IV Intermittent every 24 hours  dextrose 5%. 1000 milliLiter(s) (100 mL/Hr) IV Continuous <Continuous>  dextrose 5%. 1000 milliLiter(s) (50 mL/Hr) IV Continuous <Continuous>  dextrose 50% Injectable 25 Gram(s) IV Push once  dextrose 50% Injectable 12.5 Gram(s) IV Push once  dextrose 50% Injectable 25 Gram(s) IV Push once  glucagon  Injectable 1 milliGRAM(s) IntraMuscular once  guaiFENesin ER 1200 milliGRAM(s) Oral every 12 hours  heparin   Injectable 5000 Unit(s) SubCutaneous every 12 hours  insulin lispro (ADMELOG) corrective regimen sliding scale   SubCutaneous three times a day before meals  metoprolol tartrate 25 milliGRAM(s) Oral two times a day  sacubitril 49 mG/valsartan 51 mG 1 Tablet(s) Oral two times a day    MEDICATIONS  (PRN):  dextrose Oral Gel 15 Gram(s) Oral once PRN Blood Glucose LESS THAN 70 milliGRAM(s)/deciliter      Allergies    No Known Allergies    Intolerances      Review of Systems:  Constitutional: No fever  Eyes: No blurry vision  Neuro: No tremors  HEENT: No pain  Cardiovascular: No chest pain, palpitations  Respiratory: No SOB, no cough  GI: No nausea, vomiting, abdominal pain  : No dysuria  Skin: no rash  Psych: no depression  Endocrine: no polyuria, polydipsia  Hem/lymph: no swelling  Osteoporosis: no fractures    PHYSICAL EXAM:  VITALS: T(C): 36.9 (23 @ 14:04)  T(F): 98.4 (23 @ 14:04), Max: 98.9 (23 @ 01:15)  HR: 71 (23 @ 14:04) (71 - 82)  BP: 158/79 (23 @ 14:04) (134/74 - 158/90)  RR:  (18 - 18)  SpO2:  (93% - 100%)  Wt(kg): --  GENERAL: NAD  EYES: No proptosis, no lid lag, anicteric  THYROID: Normal size, no palpable nodules  RESPIRATORY: Clear to auscultation bilaterally  CARDIOVASCULAR: Regular rate and rhythm  GI: Soft, nontender, non distended  EXT: b/l feet without wounds; 2+ pulses  PSYCH: Alert and oriented x 3, reactive mood    POCT Blood Glucose.: 252 mg/dL (23 @ 13:56)  POCT Blood Glucose.: 237 mg/dL (23 @ 09:06)  POCT Blood Glucose.: 131 mg/dL (23 @ 21:24)  POCT Blood Glucose.: 256 mg/dL (23 @ 17:49)  POCT Blood Glucose.: 261 mg/dL (23 @ 13:09)  POCT Blood Glucose.: 261 mg/dL (23 @ 09:16)  POCT Blood Glucose.: 241 mg/dL (23 @ 22:29)                            13.9   8.14  )-----------( 297      ( 2023 07:20 )             39.8           134<L>  |  96  |  19  ----------------------------<  243<H>  3.9   |  23  |  0.78    eGFR: 93    Ca    9.4          TPro  8.0  /  Alb  3.8  /  TBili  0.6  /  DBili  x   /  AST  17  /  ALT  20  /  AlkPhos  104        Thyroid Function Tests:   @ 07:41 TSH 5.56 FreeT4 -- T3 -- Anti TPO -- Anti Thyroglobulin Ab -- TSI --      A1C with Estimated Average Glucose Result: 10.0 % (23 @ 07:41)       Chol 183 Direct LDL -- LDL calculated 114<H> HDL 43 Trig 136    Radiology:

## 2023-06-28 NOTE — PROGRESS NOTE ADULT - SUBJECTIVE AND OBJECTIVE BOX
DATE OF SERVICE: 06-28-23 @ 12:43  CHIEF COMPLAINT:Patient is a 73y old  Male who presents with a chief complaint of pneumonia (26 Jun 2023 14:31)    	        PAST MEDICAL & SURGICAL HISTORY:  HTN (hypertension)      DM (diabetes mellitus)              REVIEW OF SYSTEMS:  CONSTITUTIONAL: No fever, weight loss, or fatigue  EYES: No eye pain, visual disturbances, or discharge  NECK: No pain or stiffness  RESPIRATORY: No cough, wheezing, chills or hemoptysis; No Shortness of Breath  CARDIOVASCULAR: No chest pain, palpitations, passing out,   GASTROINTESTINAL: No abdominal or epigastric pain. No nausea, vomiting, or hematemesis; No diarrhea or constipation. No melena or hematochezia.  GENITOURINARY: No dysuria, frequency, hematuria, or incontinence  NEUROLOGICAL: No headaches,     Medications:  MEDICATIONS  (STANDING):  albuterol/ipratropium for Nebulization 3 milliLiter(s) Nebulizer every 6 hours  aspirin enteric coated 81 milliGRAM(s) Oral daily  azithromycin  IVPB      azithromycin  IVPB 500 milliGRAM(s) IV Intermittent every 24 hours  cefTRIAXone   IVPB 1000 milliGRAM(s) IV Intermittent every 24 hours  dextrose 5%. 1000 milliLiter(s) (100 mL/Hr) IV Continuous <Continuous>  dextrose 5%. 1000 milliLiter(s) (50 mL/Hr) IV Continuous <Continuous>  dextrose 50% Injectable 25 Gram(s) IV Push once  dextrose 50% Injectable 12.5 Gram(s) IV Push once  dextrose 50% Injectable 25 Gram(s) IV Push once  glucagon  Injectable 1 milliGRAM(s) IntraMuscular once  guaiFENesin ER 1200 milliGRAM(s) Oral every 12 hours  heparin   Injectable 5000 Unit(s) SubCutaneous every 12 hours  insulin lispro (ADMELOG) corrective regimen sliding scale   SubCutaneous three times a day before meals  metoprolol tartrate 25 milliGRAM(s) Oral two times a day  sacubitril 49 mG/valsartan 51 mG 1 Tablet(s) Oral two times a day    MEDICATIONS  (PRN):  dextrose Oral Gel 15 Gram(s) Oral once PRN Blood Glucose LESS THAN 70 milliGRAM(s)/deciliter    	    PHYSICAL EXAM:  T(C): 36.8 (06-28-23 @ 10:03), Max: 37.2 (06-28-23 @ 01:15)  HR: 75 (06-28-23 @ 10:03) (71 - 82)  BP: 139/78 (06-28-23 @ 10:03) (134/74 - 158/90)  RR: 18 (06-28-23 @ 10:03) (18 - 18)  SpO2: 97% (06-28-23 @ 10:03) (93% - 100%)  Wt(kg): --  I&O's Summary    27 Jun 2023 07:01  -  28 Jun 2023 07:00  --------------------------------------------------------  IN: 1040 mL / OUT: 0 mL / NET: 1040 mL          HEENT:   Normal oral mucosa,   Cardiovascular: Normal S1 S2, No JVD,  Respiratory: Lungs clear to auscultation	  Psychiatry: A & O x   Gastrointestinal:  Soft, Non-tender, + BS	  	  Neurologic: Non-focal  Extremities: Normal range of motion,     TELEMETRY: 	    ECG:  	  RADIOLOGY:  OTHER: 	  	  LABS:	 	    CARDIAC MARKERS:                                13.9   8.14  )-----------( 297      ( 28 Jun 2023 07:20 )             39.8     06-28    134<L>  |  96  |  19  ----------------------------<  243<H>  3.9   |  23  |  0.78    Ca    9.4      28 Jun 2023 07:20      proBNP:   Lipid Profile:   HgA1c:   TSH:

## 2023-06-28 NOTE — PROGRESS NOTE ADULT - PROBLEM SELECTOR PLAN 1
acute bronchitis - CT chest with no clear PNA, few GGO/nodules  -Small L basilar opacity seen on CXR corresponds to atelectasis seen on CT chest   -Cough x 2 weeks, rhonchi L base (now improving)  -D/c azithromycing  -Continue Ceftriaxone while inpatient. Can discharge on Ceftin 500mg PO BID to complete 5 day course.   -Continue Duoneb q6h  -Continue Mucinex 1200mg PO BID x 5 days  -Normoxic, keep sats >90% with o2 PRN.

## 2023-06-28 NOTE — PROGRESS NOTE ADULT - ASSESSMENT
73M with CAD s/p 3 stents, HTN, T2DM, admitted for pneumonia, cardiology consulted for ST depressions and LVH noted on EKG.      ST Depressions    Hypertensive urgency  improved    - F/u  TTE noted  cath today   c/w meds    likely pna  bronchitis  abs as per pulm  nebs  pulm eval  noted  cards f/u noted  dm  fsg riss  endo f/u  dvt proph

## 2023-06-28 NOTE — PROGRESS NOTE ADULT - ASSESSMENT
74 y/o mostly Maltese speaking M with PMH of DM, HTN, CAD s/p 3 stents. Presents with cough for the past 2 weeks. CXR with small L basilar opacity. Also noted to be in hypertensive urgency in ED.

## 2023-06-28 NOTE — PROGRESS NOTE ADULT - ASSESSMENT
73M with CAD s/p 3 stents, HTN, T2DM, admitted for pneumonia, cardiology consulted for ST depressions and LVH noted on EKG.  This is not ACS.  However, with echo showing down ef to 30's and segmental WMA. Stage B. Will pursue ischemic work up w/ diagnostic LHC     #ST Depressions  #LVH  #Hypertensive urgency  #HFrEF w/ Focal WMA    - TTE w/ EF 30-35%. Stage B, Killips 1. COntinue entresto 49/51mg bid, metoprolol tartrate can be converted to succinate 50mg daily today. Will consider further uptitration of entresto/addition of MCRA in coming days  - TO discuss for LHC today   - A1c 10,  HDL 43 Cholesterol 183 tgl 136. TSH 5.56, f/u fT4 and T3  - Management of probable bronchitis/PNA as per primary team 73M with CAD s/p 3 stents, HTN, T2DM, admitted for pneumonia, cardiology consulted for ST depressions and LVH noted on EKG.  This is not ACS.  However, with echo showing down ef to 30's and segmental WMA. Stage B. Will pursue ischemic work up w/ diagnostic LHC     #ST Depressions  #LVH  #Hypertensive urgency  #HFrEF w/ Focal WMA    - TTE w/ EF 30-35%. Stage B, Killips 1. COntinue entresto 49/51mg bid, metoprolol tartrate can be converted to succinate 50mg daily today. Will consider further uptitration of entresto/addition of MCRA in coming days  - LHC today  - A1c 10,  HDL 43 Cholesterol 183 tgl 136. TSH 5.56, f/u fT4 and T3  - Management of probable bronchitis/PNA as per primary team (on CTX/azithro)

## 2023-06-28 NOTE — PROGRESS NOTE ADULT - SUBJECTIVE AND OBJECTIVE BOX
Gerardo Sotelo MD  Cardiology Fellow  [phone]  All Cardiology service information can be found  on [email or web address], password: [email or web address]    Patient seen and examined at bedside.  VS HR 70s-80s BP 130s-150s/70s-90s  CT chest yesterday w/ Bilateral lower lobe dependent atelectasis.  Mild right middle lobe groundglass and small right upper lobe ground   glass nodules which maybe inflammatory. Recommend CT chest in 3 months   to see if they persist.  TTE w/ EF 30-35% w/ segmental WMA.   I discussed coronary angio w/ patient at bedside yesterday, to evaluate for ischemia. He has no chest pain or angina. He is amenable to diagnostic [facility]     Review Of Systems: No chest pain, shortness of breath, or palpitations            Current Meds:  albuterol/ipratropium for Nebulization 3 milliLiter(s) Nebulizer every 6 hours  aspirin enteric coated 81 milliGRAM(s) Oral daily  azithromycin  IVPB      azithromycin  IVPB 500 milliGRAM(s) IV Intermittent every 24 hours  cefTRIAXone   IVPB 1000 milliGRAM(s) IV Intermittent every 24 hours  dextrose 5%. 1000 milliLiter(s) IV Continuous <Continuous>  dextrose 5%. 1000 milliLiter(s) IV Continuous <Continuous>  dextrose 50% Injectable 25 Gram(s) IV Push once  dextrose 50% Injectable 12.5 Gram(s) IV Push once  dextrose 50% Injectable 25 Gram(s) IV Push once  dextrose Oral Gel 15 Gram(s) Oral once PRN  glucagon  Injectable 1 milliGRAM(s) IntraMuscular once  guaiFENesin ER 1200 milliGRAM(s) Oral every 12 hours  heparin   Injectable 5000 Unit(s) SubCutaneous every 12 hours  insulin lispro (ADMELOG) corrective regimen sliding scale   SubCutaneous three times a day before meals  metoprolol tartrate 25 milliGRAM(s) Oral two times a day  sacubitril 49 mG/valsartan 51 mG 1 Tablet(s) Oral two times a day      Vitals:  T(F): 98 (-), Max: 98.9 (-)  HR: 71 (-) (71 - 82)  BP: 147/82 (-) (134/74 - 158/90)  RR: 18 (-)  SpO2: 98% (-)  I&O's Summary    2023 07:01  -  2023 07:00  --------------------------------------------------------  IN: 450 mL / OUT: 0 mL / NET: 450 mL    2023 07:01  -  2023 06:09  --------------------------------------------------------  IN: 640 mL / OUT: 0 mL / NET: 640 mL        Physical Exam:  Appearance: No acute distress; well appearing  Eyes: PERRL, EOMI, pink conjunctiva  HEENT: Normal oral mucosa  Cardiovascular: RRR, S1, S2, no murmurs, rubs, or gallops; no edema; no JVD  Respiratory: Clear to auscultation bilaterally  Gastrointestinal: soft, non-tender, non-distended with normal bowel sounds  Musculoskeletal: No clubbing; no joint deformity   Neurologic: Non-focal  Lymphatic: No lymphadenopathy  Psychiatry: AAOx3, mood & affect appropriate  Skin: No rashes, ecchymoses, or cyanosis                          14.5   9.78  )-----------( 288      ( 2023 07:41 )             42.5     06-27    134<L>  |  94<L>  |  12  ----------------------------<  281<H>  4.1   |  24  |  0.76    Ca    9.3      2023 07:41    TPro  8.0  /  Alb  3.8  /  TBili  0.6  /  DBili  x   /  AST  17  /  ALT  20  /  AlkPhos  104  06-26    PT/INR - ( 2023 12:00 )   PT: 13.0 sec;   INR: 1.12 ratio         PTT - ( 2023 12:00 )  PTT:27.2 sec  CARDIAC MARKERS ( 2023 19:47 )  22 ng/L / x     / x     / x     / x     / x      CARDIAC MARKERS ( 2023 12:00 )  21 ng/L / x     / x     / x     / x     / x            Total Cholesterol: 183  LDL: --  HDL: 43  T      ECG: sinus rhythm, LVH by Sokolov Jung criteria TWI asymmetric inferolaterally    TTE  CONCLUSIONS:      1. Normal left ventricular cavity size. The left ventricular wall thickness is normal. The left ventricular systolic function is severely decreased with an ejection fraction visually estimated at 30 to 35 %. There are regional wall motion abnormalities No evidence of a thrombus in the left ventricle.   2. Multiple segmental abnormalities exist. See findings.   3. There is mild (grade 1) left ventricular diastolic dysfunction, with normal filling pressure.   4. Normal right ventricular cavity size, normal right ventricular wall thickness and normal right ventricular systolic function. The tricuspid annular plane systolic excursion (TAPSE) is 2.1 cm (normal >=1.7 cm).   5. The right atrium is normal.   6. No pericardial effusion seen.   7. No prior echocardiogram is available for comparison.   8. Symmetric mitral valve leaflet tethering.   9. There is normal LV mass and normal geometry.    CT  IMPRESSION:    Bilateral lower lobe dependent atelectasis.    Mild right middle lobe groundglass and small right upper lobe ground   glass nodules which maybe inflammatory. Recommend CT chest in 3 months   to see if they persist.

## 2023-06-28 NOTE — PROGRESS NOTE ADULT - SUBJECTIVE AND OBJECTIVE BOX
Follow-up Pulm Progress Note     #[id number]    feeling better, coughing less  sats 97% RA     Medications:  MEDICATIONS  (STANDING):  albuterol/ipratropium for Nebulization 3 milliLiter(s) Nebulizer every 6 hours  aspirin enteric coated 81 milliGRAM(s) Oral daily  azithromycin  IVPB      azithromycin  IVPB 500 milliGRAM(s) IV Intermittent every 24 hours  cefTRIAXone   IVPB 1000 milliGRAM(s) IV Intermittent every 24 hours  dextrose 5%. 1000 milliLiter(s) (100 mL/Hr) IV Continuous <Continuous>  dextrose 5%. 1000 milliLiter(s) (50 mL/Hr) IV Continuous <Continuous>  dextrose 50% Injectable 25 Gram(s) IV Push once  dextrose 50% Injectable 12.5 Gram(s) IV Push once  dextrose 50% Injectable 25 Gram(s) IV Push once  glucagon  Injectable 1 milliGRAM(s) IntraMuscular once  guaiFENesin ER 1200 milliGRAM(s) Oral every 12 hours  heparin   Injectable 5000 Unit(s) SubCutaneous every 12 hours  insulin lispro (ADMELOG) corrective regimen sliding scale   SubCutaneous three times a day before meals  metoprolol tartrate 25 milliGRAM(s) Oral two times a day  sacubitril 49 mG/valsartan 51 mG 1 Tablet(s) Oral two times a day    MEDICATIONS  (PRN):  dextrose Oral Gel 15 Gram(s) Oral once PRN Blood Glucose LESS THAN 70 milliGRAM(s)/deciliter          Vital Signs Last 24 Hrs  T(C): 36.7 (28 Jun 2023 05:27), Max: 37.2 (28 Jun 2023 01:15)  T(F): 98 (28 Jun 2023 05:27), Max: 98.9 (28 Jun 2023 01:15)  HR: 71 (28 Jun 2023 05:27) (71 - 82)  BP: 147/82 (28 Jun 2023 05:27) (134/74 - 158/90)  BP(mean): --  RR: 18 (28 Jun 2023 05:27) (18 - 18)  SpO2: 98% (28 Jun 2023 05:27) (93% - 100%)    Parameters below as of 28 Jun 2023 05:27  Patient On (Oxygen Delivery Method): room air          VBG pH 7.40 06-26 @ 12:00    VBG pCO2 44 06-26 @ 12:00    VBG O2 sat 59.0 06-26 @ 12:00    VBG lactate 2.4 06-26 @ 12:00      06-27 @ 07:01  -  06-28 @ 07:00  --------------------------------------------------------  IN: 1040 mL / OUT: 0 mL / NET: 1040 mL          LABS:                        13.9   8.14  )-----------( 297      ( 28 Jun 2023 07:20 )             39.8     06-28    134<L>  |  96  |  19  ----------------------------<  243<H>  3.9   |  23  |  0.78    Ca    9.4      28 Jun 2023 07:20    TPro  8.0  /  Alb  3.8  /  TBili  0.6  /  DBili  x   /  AST  17  /  ALT  20  /  AlkPhos  104  06-26          CAPILLARY BLOOD GLUCOSE      POCT Blood Glucose.: 237 mg/dL (28 Jun 2023 09:06)    PT/INR - ( 26 Jun 2023 12:00 )   PT: 13.0 sec;   INR: 1.12 ratio         PTT - ( 26 Jun 2023 12:00 )  PTT:27.2 sec  Urinalysis Basic - ( 28 Jun 2023 07:20 )    Color: x / Appearance: x / SG: x / pH: x  Gluc: 243 mg/dL / Ketone: x  / Bili: x / Urobili: x   Blood: x / Protein: x / Nitrite: x   Leuk Esterase: x / RBC: x / WBC x   Sq Epi: x / Non Sq Epi: x / Bacteria: x      Procalcitonin, Serum: 0.09 ng/mL (06-27-23 @ 07:41)          Physical Examination:  PULM: minimal scattered rhonchi   CVS: S1, S2 heard    RADIOLOGY REVIEWED    CT chest: < from: CT Chest No Cont (06.27.23 @ 11:01) >  FINDINGS:    LYMPH NODES: No lymphadenopathy.    HEART/VASCULATURE: The heart is normal in size. Aortic and coronary   artery calcifications. No pericardial effusion. Bilateral partially   calcified pleural plaques compatible with asbestos exposure.    AIRWAYS/LUNGS/PLEURA: The central airways are patent. Bilateral lower   lobe dependent linear opacities. Right upper lobe subsolid nodule   measures 6 mm (3-47). Right upper lobe perifissural ground glass nodule   measures 5 mm (7-144, 3-60). Mild peribronchovascular groundglass in the   lateral segment of the right middle lobe. No pleural effusion or   pneumothorax.    UPPER ABDOMEN: Unremarkable.    BONES/SOFT TISSUES: Mild degenerative changes.    IMPRESSION:    Bilateral lower lobe dependent atelectasis.    Mild right middle lobe groundglass and small right upper lobe ground   glass nodules which maybe inflammatory. Recommend CT chest in 3 months   to see if they persist.    < end of copied text >      TTE: < from: TTE W or WO Ultrasound Enhancing Agent (06.27.23 @ 14:05) >  CONCLUSIONS:      1. Normal left ventricular cavity size. The left ventricular wall thickness is normal. The left ventricular systolic function is severely decreased with an ejection fraction visually estimated at 30 to 35 %. There are regional wall motion abnormalities No evidence of a thrombus in the left ventricle.   2. Multiple segmental abnormalities exist. See findings.   3. There is mild (grade 1) left ventricular diastolic dysfunction, with normal filling pressure.   4. Normal right ventricular cavity size, normal right ventricular wall thickness and normal right ventricular systolic function. The tricuspid annular plane systolic excursion (TAPSE) is 2.1 cm (normal >=1.7 cm).   5. The right atrium is normal.   6. No pericardial effusion seen.   7. No prior echocardiogram is available for comparison.   8. Symmetric mitral valve leaflet tethering.   9. There is normal LV mass and normal geometry.    ________________________________________________________________________________________  FINDINGS:     Left Ventricle:  Normal left ventricular cavity size. The left ventricular wall thickness is normal. The left ventricular systolic function is severely decreased with an ejection fraction visually estimated at 30 to 35%. There are regional wall motion abnormalities consistent with ischemic heart disease. There is mild (grade 1) left ventricular diastolic dysfunction, with normal filling pressure. There is normal LV mass and normal geometry. There is no evidence of a thrombus in the left ventricle.  LV Wall Scoring: The apex is akinetic. The mid and apical anterior septum, mid  inferolateral segment, apical lateral segment, apical anterior segment, and  basal inferior segment are hypokinetic. All remaining scored segments are  normal.          Right Ventricle:  Normal right ventricular cavity size, normal wall thickness and normal right ventricular systolic function. Tricuspid annular plane systolic excursion (TAPSE) is 2.1 cm (normal >=1.7 cm).     Left Atrium:  The left atrium is normal.     Right Atrium:  The right atrium is normal.     Aortic Valve:  The aortic valve is tricuspid with normal structure without stenosis. There is no evidence of aortic regurgitation.     Mitral Valve:  Structurally normal mitral valve with normal leaflet opening and closing, without any evidence of mitral stenosis or significant regurgitation. There is symmetric leaflet tethering. There is trace mitral regurgitation.    Tricuspid Valve:  Structurally normal tricuspid valve with normal leaflet excursion. There is trace tricuspid regurgitation.     Pulmonic Valve:  Structurally normal pulmonic valve with normal leaflet excursion. There is trace pulmonic regurgitation.     Aorta:  The aortic annulus and aortic root appear normal in size. Normal aorta sinus of Valsalva, measuring 3.20 cm (indexed 2.05 cm/m²).     Pericardium:  No pericardial effusion seen.  ____________________________________________________________________  Quantitative Data:  Left Ventricle Measurements: (Indexed to BSA)     IVSd (2D):   0.9 cm  LVPWd (2D):  0.9 cm  LVIDd (2D):  4.9 cm  LVIDs (2D):  4.1 cm  LV Mass:     151 g  96.9 g/m²  Visualized LV EF%: 30 to 35%     MV E Vmax:    0.57 m/s  MV A Vmax:    1.16 m/s  MV E/A:       0.49  e' lateral:   5.98 cm/s  e' medial:    4.13 cm/s  E/e' lateral: 9.55  E/e' medial:  13.83  E/e' Average: 11.30    Aorta Measurements:     Ao Sinus:      3.2 cm  Ao Root:       3.2 cm  Ao Root s, 2D: 3.2 cm       Left Atrium Measurements: (Indexed to BSA)  LA Diam 2D: 3.40 cm    Right Ventricle Measurements:     TAPSE: 2.1 cm       LVOT / RVOT/ Qp/Qs Data: (Indexed to BSA)  Mitral Valve Measurements:     MV E Vmax: 0.6 m/s  MV A Vmax: 1.2 m/s  MV E/A:   0.5       --------------------------------------------------------------------------------  TomTec:  LV Analysis:  EF: 25 %      < end of copied text >

## 2023-06-29 DIAGNOSIS — H61.20 IMPACTED CERUMEN, UNSPECIFIED EAR: ICD-10-CM

## 2023-06-29 DIAGNOSIS — I25.10 ATHEROSCLEROTIC HEART DISEASE OF NATIVE CORONARY ARTERY WITHOUT ANGINA PECTORIS: ICD-10-CM

## 2023-06-29 LAB
ANION GAP SERPL CALC-SCNC: 13 MMOL/L — SIGNIFICANT CHANGE UP (ref 5–17)
BLD GP AB SCN SERPL QL: NEGATIVE — SIGNIFICANT CHANGE UP
BUN SERPL-MCNC: 22 MG/DL — SIGNIFICANT CHANGE UP (ref 7–23)
CALCIUM SERPL-MCNC: 9.7 MG/DL — SIGNIFICANT CHANGE UP (ref 8.4–10.5)
CHLORIDE SERPL-SCNC: 101 MMOL/L — SIGNIFICANT CHANGE UP (ref 96–108)
CO2 SERPL-SCNC: 21 MMOL/L — LOW (ref 22–31)
CREAT SERPL-MCNC: 0.85 MG/DL — SIGNIFICANT CHANGE UP (ref 0.5–1.3)
EGFR: 91 ML/MIN/1.73M2 — SIGNIFICANT CHANGE UP
GLUCOSE BLDC GLUCOMTR-MCNC: 145 MG/DL — HIGH (ref 70–99)
GLUCOSE BLDC GLUCOMTR-MCNC: 149 MG/DL — HIGH (ref 70–99)
GLUCOSE BLDC GLUCOMTR-MCNC: 204 MG/DL — HIGH (ref 70–99)
GLUCOSE BLDC GLUCOMTR-MCNC: 231 MG/DL — HIGH (ref 70–99)
GLUCOSE SERPL-MCNC: 229 MG/DL — HIGH (ref 70–99)
HCT VFR BLD CALC: 43.3 % — SIGNIFICANT CHANGE UP (ref 39–50)
HGB BLD-MCNC: 14.6 G/DL — SIGNIFICANT CHANGE UP (ref 13–17)
MAGNESIUM SERPL-MCNC: 2.3 MG/DL — SIGNIFICANT CHANGE UP (ref 1.6–2.6)
MCHC RBC-ENTMCNC: 30.2 PG — SIGNIFICANT CHANGE UP (ref 27–34)
MCHC RBC-ENTMCNC: 33.7 GM/DL — SIGNIFICANT CHANGE UP (ref 32–36)
MCV RBC AUTO: 89.6 FL — SIGNIFICANT CHANGE UP (ref 80–100)
NRBC # BLD: 0 /100 WBCS — SIGNIFICANT CHANGE UP (ref 0–0)
PHOSPHATE SERPL-MCNC: 3.3 MG/DL — SIGNIFICANT CHANGE UP (ref 2.5–4.5)
PLATELET # BLD AUTO: 336 K/UL — SIGNIFICANT CHANGE UP (ref 150–400)
POTASSIUM SERPL-MCNC: 3.9 MMOL/L — SIGNIFICANT CHANGE UP (ref 3.5–5.3)
POTASSIUM SERPL-SCNC: 3.9 MMOL/L — SIGNIFICANT CHANGE UP (ref 3.5–5.3)
RBC # BLD: 4.83 M/UL — SIGNIFICANT CHANGE UP (ref 4.2–5.8)
RBC # FLD: 12.9 % — SIGNIFICANT CHANGE UP (ref 10.3–14.5)
RH IG SCN BLD-IMP: POSITIVE — SIGNIFICANT CHANGE UP
SODIUM SERPL-SCNC: 135 MMOL/L — SIGNIFICANT CHANGE UP (ref 135–145)
T3 SERPL-MCNC: 66 NG/DL — LOW (ref 80–200)
T4 AB SER-ACNC: 6.7 UG/DL — SIGNIFICANT CHANGE UP (ref 4.6–12)
TSH SERPL-MCNC: 4.73 UIU/ML — HIGH (ref 0.27–4.2)
WBC # BLD: 7.99 K/UL — SIGNIFICANT CHANGE UP (ref 3.8–10.5)
WBC # FLD AUTO: 7.99 K/UL — SIGNIFICANT CHANGE UP (ref 3.8–10.5)

## 2023-06-29 PROCEDURE — 99233 SBSQ HOSP IP/OBS HIGH 50: CPT | Mod: GC

## 2023-06-29 PROCEDURE — 93458 L HRT ARTERY/VENTRICLE ANGIO: CPT | Mod: 26

## 2023-06-29 PROCEDURE — 99152 MOD SED SAME PHYS/QHP 5/>YRS: CPT

## 2023-06-29 RX ORDER — INSULIN LISPRO 100/ML
6 VIAL (ML) SUBCUTANEOUS
Refills: 0 | Status: DISCONTINUED | OUTPATIENT
Start: 2023-06-29 | End: 2023-07-05

## 2023-06-29 RX ORDER — METOPROLOL TARTRATE 50 MG
50 TABLET ORAL DAILY
Refills: 0 | Status: DISCONTINUED | OUTPATIENT
Start: 2023-06-29 | End: 2023-07-17

## 2023-06-29 RX ORDER — INSULIN GLARGINE 100 [IU]/ML
18 INJECTION, SOLUTION SUBCUTANEOUS AT BEDTIME
Refills: 0 | Status: DISCONTINUED | OUTPATIENT
Start: 2023-06-29 | End: 2023-07-11

## 2023-06-29 RX ORDER — CARBAMIDE PEROXIDE 81.86 MG/ML
4 SOLUTION/ DROPS AURICULAR (OTIC)
Refills: 0 | Status: DISCONTINUED | OUTPATIENT
Start: 2023-06-29 | End: 2023-06-29

## 2023-06-29 RX ORDER — ONDANSETRON 8 MG/1
4 TABLET, FILM COATED ORAL ONCE
Refills: 0 | Status: COMPLETED | OUTPATIENT
Start: 2023-06-29 | End: 2023-06-29

## 2023-06-29 RX ORDER — CARBAMIDE PEROXIDE 81.86 MG/ML
4 SOLUTION/ DROPS AURICULAR (OTIC)
Refills: 0 | Status: COMPLETED | OUTPATIENT
Start: 2023-06-29 | End: 2023-07-01

## 2023-06-29 RX ADMIN — SACUBITRIL AND VALSARTAN 1 TABLET(S): 24; 26 TABLET, FILM COATED ORAL at 06:13

## 2023-06-29 RX ADMIN — Medication 50 MILLIGRAM(S): at 11:31

## 2023-06-29 RX ADMIN — CARBAMIDE PEROXIDE 4 DROP(S): 81.86 SOLUTION/ DROPS AURICULAR (OTIC) at 17:14

## 2023-06-29 RX ADMIN — Medication 3 MILLILITER(S): at 17:09

## 2023-06-29 RX ADMIN — SACUBITRIL AND VALSARTAN 1 TABLET(S): 24; 26 TABLET, FILM COATED ORAL at 17:09

## 2023-06-29 RX ADMIN — HEPARIN SODIUM 5000 UNIT(S): 5000 INJECTION INTRAVENOUS; SUBCUTANEOUS at 17:07

## 2023-06-29 RX ADMIN — HEPARIN SODIUM 5000 UNIT(S): 5000 INJECTION INTRAVENOUS; SUBCUTANEOUS at 06:13

## 2023-06-29 RX ADMIN — CARBAMIDE PEROXIDE 4 DROP(S): 81.86 SOLUTION/ DROPS AURICULAR (OTIC) at 01:57

## 2023-06-29 RX ADMIN — Medication 2: at 09:32

## 2023-06-29 RX ADMIN — Medication 3 MILLILITER(S): at 11:32

## 2023-06-29 RX ADMIN — Medication 5 UNIT(S): at 09:32

## 2023-06-29 RX ADMIN — ONDANSETRON 4 MILLIGRAM(S): 8 TABLET, FILM COATED ORAL at 21:42

## 2023-06-29 RX ADMIN — Medication 81 MILLIGRAM(S): at 11:31

## 2023-06-29 RX ADMIN — INSULIN GLARGINE 18 UNIT(S): 100 INJECTION, SOLUTION SUBCUTANEOUS at 22:24

## 2023-06-29 RX ADMIN — Medication 1200 MILLIGRAM(S): at 06:13

## 2023-06-29 RX ADMIN — Medication 3 MILLILITER(S): at 06:13

## 2023-06-29 RX ADMIN — Medication 1200 MILLIGRAM(S): at 17:09

## 2023-06-29 RX ADMIN — Medication 6 UNIT(S): at 17:11

## 2023-06-29 RX ADMIN — Medication 25 MILLIGRAM(S): at 06:13

## 2023-06-29 NOTE — CHART NOTE - NSCHARTNOTEFT_GEN_A_CORE
Pt s/p cardiac cath via RRA access site on 6/29/2023.   Pt seen and examined at bedside, resting, NAD.  RRA access site examined.   Skin around site warm, soft, non-tender, no hematoma noted dressing dry and intact.  Palpable radial pulse. Patient denies any pain, numbness or tingling of the extremity.     >Vital signs stable  >Will continue to closely assess and monitor overnight        Eveline Corbett PA-C  Internal Medicine   [id number]

## 2023-06-29 NOTE — CONSULT NOTE ADULT - SUBJECTIVE AND OBJECTIVE BOX
CC: Left Ear pain x 5 days.    HPI: 72 YO mostly Estonian speaking M with PMH of DM, HTN, CAD s/p 3 stents. Presents with cough for the past 2 weeks. CXR with small L basilar opacity. Also noted to be in hypertensive urgency in ED. ENT was consulted for evaluation of left ear pain. Per pt, pain started 1 week ago, no eardrops used. Reports no discharge from the left ear with associated decreased hearing from the left ear. No reported fever/chills. . Also reports cleans ears occasionally with Q-tip. Denies recent swimming pool use, Airplane travel/ trauma.  Denies fevers, chills, recent upper respiratory tract infection, previous episodes of ear infections, exacerbation of seasonal allergic, ear pain, denies balance issues/ringing in the ears/vertigo, Denies headache/ neck pain/ photophobia.     PAST MEDICAL & SURGICAL HISTORY:  HTN (hypertension)  DM (diabetes mellitus)    Allergies  No Known Allergies    Intolerances    MEDICATIONS  (STANDING):  albuterol/ipratropium for Nebulization 3 milliLiter(s) Nebulizer every 6 hours  aspirin enteric coated 81 milliGRAM(s) Oral daily  cefTRIAXone   IVPB 1000 milliGRAM(s) IV Intermittent every 24 hours  dextrose 5%. 1000 milliLiter(s) (100 mL/Hr) IV Continuous <Continuous>  dextrose 5%. 1000 milliLiter(s) (50 mL/Hr) IV Continuous <Continuous>  dextrose 50% Injectable 25 Gram(s) IV Push once  dextrose 50% Injectable 12.5 Gram(s) IV Push once  dextrose 50% Injectable 25 Gram(s) IV Push once  glucagon  Injectable 1 milliGRAM(s) IntraMuscular once  guaiFENesin ER 1200 milliGRAM(s) Oral every 12 hours  heparin   Injectable 5000 Unit(s) SubCutaneous every 12 hours  insulin glargine Injectable (LANTUS) 15 Unit(s) SubCutaneous at bedtime  insulin lispro (ADMELOG) corrective regimen sliding scale   SubCutaneous three times a day before meals  insulin lispro (ADMELOG) corrective regimen sliding scale   SubCutaneous at bedtime  insulin lispro Injectable (ADMELOG) 5 Unit(s) SubCutaneous three times a day before meals  metoprolol tartrate 25 milliGRAM(s) Oral two times a day  sacubitril 49 mG/valsartan 51 mG 1 Tablet(s) Oral two times a day    MEDICATIONS  (PRN):  dextrose Oral Gel 15 Gram(s) Oral once PRN Blood Glucose LESS THAN 70 milliGRAM(s)/deciliter    Social History:     Family history: No pertinent family history in first degree relatives    ROS:   ENT: all negative except as noted in HPI   CV: denies palpitations  Pulm: denies SOB, cough, hemoptysis  GI: denies change in apetite, indigestion, n/v  : denies pertinent urinary symptoms, urgency  Neuro: denies numbness/tingling, loss of sensation  Psych: denies anxiety  MS: denies muscle weakness, instability  Heme: denies easy bruising or bleeding  Endo: denies heat/cold intolerance, excessive sweating  Vascular: denies LE edema    Vital Signs Last 24 Hrs  T(C): 37.7 (28 Jun 2023 22:30), Max: 37.7 (28 Jun 2023 22:30)  T(F): 99.8 (28 Jun 2023 22:30), Max: 99.8 (28 Jun 2023 22:30)  HR: 70 (28 Jun 2023 22:30) (70 - 82)  BP: 130/80 (28 Jun 2023 22:30) (116/70 - 158/90)  BP(mean): --  RR: 18 (28 Jun 2023 22:30) (18 - 18)  SpO2: 95% (28 Jun 2023 22:30) (95% - 100%)  Parameters below as of 28 Jun 2023 22:30  Patient On (Oxygen Delivery Method): room air                        13.9   8.14  )-----------( 297      ( 28 Jun 2023 07:20 )             39.8    06-28    134<L>  |  96  |  19  ----------------------------<  243<H>  3.9   |  23  |  0.78    Ca    9.4      28 Jun 2023 07:20     PHYSICAL EXAM:  Gen: NAD  Skin: No rashes, bruises, or lesions  Head: Normocephalic, Atraumatic  Face: no edema, erythema, or fluctuance. Parotid glands soft without mass  Eyes: no scleral injection  Ears: Right - ear canal clear, TM intact without effusion or erythema. No evidence of any fluid drainage. No mastoid tenderness, erythema, or ear bulging          Left - + Cerumen impaction. No fluid drainage,  No EAC Erythema. Unable to visualize TM idue to cerumen. No mastoid tenderness, erythema, or ear bulging.   Nose: Nares bilaterally patent, no discharge  Mouth: No Stridor / Drooling / Trismus.  Mucosa moist, tongue/uvula midline, oropharynx clear  Neck: Flat, supple, no lymphadenopathy, trachea midline, no masses  Lymphatic: No lymphadenopathy  Resp: breathing easily, no stridor  CV: no peripheral edema/cyanosis  GI: nondistended   Peripheral vascular: no JVD or edema  Neuro: facial nerve intact, no facial droop.  CC: Left Ear pain.     HPI: 72 YO mostly Arabic speaking M with PMH of DM, HTN, CAD s/p 3 stents. Presents with cough for the past 2 weeks. CXR with small L basilar opacity. Also noted to be in hypertensive urgency in ED. ENT was consulted for evaluation of left ear pain. Per pt, pain started 1 week ago, no eardrops used. Reports no discharge from the left ear with associated decreased hearing from the left ear. No reported fever/chills.  Denies recent swimming pool use, Airplane travel/ trauma.  Denies fevers, chills,  previous episodes of ear infections, exacerbation of seasonal allergic, ear pain, denies balance issues/ringing in the ears/vertigo, Denies headache/ neck pain/ photophobia.     PAST MEDICAL & SURGICAL HISTORY:  HTN (hypertension)  DM (diabetes mellitus)    Allergies  No Known Allergies    Intolerances    MEDICATIONS  (STANDING):  albuterol/ipratropium for Nebulization 3 milliLiter(s) Nebulizer every 6 hours  aspirin enteric coated 81 milliGRAM(s) Oral daily  cefTRIAXone   IVPB 1000 milliGRAM(s) IV Intermittent every 24 hours  dextrose 5%. 1000 milliLiter(s) (100 mL/Hr) IV Continuous <Continuous>  dextrose 5%. 1000 milliLiter(s) (50 mL/Hr) IV Continuous <Continuous>  dextrose 50% Injectable 25 Gram(s) IV Push once  dextrose 50% Injectable 12.5 Gram(s) IV Push once  dextrose 50% Injectable 25 Gram(s) IV Push once  glucagon  Injectable 1 milliGRAM(s) IntraMuscular once  guaiFENesin ER 1200 milliGRAM(s) Oral every 12 hours  heparin   Injectable 5000 Unit(s) SubCutaneous every 12 hours  insulin glargine Injectable (LANTUS) 15 Unit(s) SubCutaneous at bedtime  insulin lispro (ADMELOG) corrective regimen sliding scale   SubCutaneous three times a day before meals  insulin lispro (ADMELOG) corrective regimen sliding scale   SubCutaneous at bedtime  insulin lispro Injectable (ADMELOG) 5 Unit(s) SubCutaneous three times a day before meals  metoprolol tartrate 25 milliGRAM(s) Oral two times a day  sacubitril 49 mG/valsartan 51 mG 1 Tablet(s) Oral two times a day    MEDICATIONS  (PRN):  dextrose Oral Gel 15 Gram(s) Oral once PRN Blood Glucose LESS THAN 70 milliGRAM(s)/deciliter    Social History:     Family history: No pertinent family history in first degree relatives    ROS:   ENT: all negative except as noted in HPI   CV: denies palpitations  Pulm: denies SOB, cough, hemoptysis  GI: denies change in apetite, indigestion, n/v  : denies pertinent urinary symptoms, urgency  Neuro: denies numbness/tingling, loss of sensation  Psych: denies anxiety  MS: denies muscle weakness, instability  Heme: denies easy bruising or bleeding  Endo: denies heat/cold intolerance, excessive sweating  Vascular: denies LE edema    Vital Signs Last 24 Hrs  T(C): 37.7 (28 Jun 2023 22:30), Max: 37.7 (28 Jun 2023 22:30)  T(F): 99.8 (28 Jun 2023 22:30), Max: 99.8 (28 Jun 2023 22:30)  HR: 70 (28 Jun 2023 22:30) (70 - 82)  BP: 130/80 (28 Jun 2023 22:30) (116/70 - 158/90)  BP(mean): --  RR: 18 (28 Jun 2023 22:30) (18 - 18)  SpO2: 95% (28 Jun 2023 22:30) (95% - 100%)  Parameters below as of 28 Jun 2023 22:30  Patient On (Oxygen Delivery Method): room air                        13.9   8.14  )-----------( 297      ( 28 Jun 2023 07:20 )             39.8    06-28    134<L>  |  96  |  19  ----------------------------<  243<H>  3.9   |  23  |  0.78    Ca    9.4      28 Jun 2023 07:20     PHYSICAL EXAM:  Gen: NAD  Skin: No rashes, bruises, or lesions  Head: Normocephalic, Atraumatic  Face: no edema, erythema, or fluctuance. Parotid glands soft without mass  Eyes: no scleral injection  Ears: Right - ear canal clear, TM intact without effusion or erythema. No evidence of any fluid drainage. No mastoid tenderness, erythema, or ear bulging          Left - + Cerumen impaction. No fluid drainage,  No EAC Erythema. Unable to visualize TM due to cerumen. No mastoid tenderness, erythema, or ear bulging.   Nose: Nares bilaterally patent, no discharge  Mouth: No Stridor / Drooling / Trismus.  Mucosa moist, tongue/uvula midline, oropharynx clear  Neck: Flat, supple, no lymphadenopathy, trachea midline, no masses  Lymphatic: No lymphadenopathy  Resp: breathing easily, no stridor  CV: no peripheral edema/cyanosis  GI: nondistended   Peripheral vascular: no JVD or edema  Neuro: facial nerve intact, no facial droop.

## 2023-06-29 NOTE — PROGRESS NOTE ADULT - ASSESSMENT
73M with CAD s/p 3 stents, HTN, T2DM, admitted for pneumonia, cardiology consulted for ST depressions and LVH noted on EKG.  This is not ACS.  However, with echo showing down ef to 30's and segmental WMA. Stage B. Will pursue ischemic work up w/ diagnostic LHC     #ST Depressions  #LVH  #Hypertensive urgency  #HFrEF w/ Focal WMA    - TTE w/ EF 30-35%. Stage B, Killips 1. Continue entresto 49/51mg bid, please convert to metop succinate 50mg daily today. Will consider further uptitration of entresto/addition of MCRA in coming days  - LHC today (was postpned yesterday)  - Aspirin, atorva 40  - A1c 10,  HDL 43 Cholesterol 183 tgl 136. TSH 5.56, f/u fT4 and T3  - Management of probable bronchitis/PNA as per primary team (on CTX/azithro)   73M with CAD s/p 3 stents, HTN, T2DM, admitted for pneumonia, cardiology consulted for ST depressions and LVH noted on EKG.  This is not ACS.  However, with echo showing down ef to 30's and segmental WMA. Stage B. Will pursue ischemic work up w/ diagnostic LHC     #ST Depressions  #LVH  #Hypertensive urgency  #HFrEF w/ Focal WMA    - TTE w/ EF 30-35%. Stage B, Killips 1. Continue entresto 49/51mg bid, please convert to metop succinate 50mg daily today. Will consider further uptitration of entresto/addition of MCRA in coming days  - LHC today (was postponed yesterday)  - Aspirin, atorva 40  - A1c 10,  HDL 43 Cholesterol 183 tgl 136. TSH 5.56, f/u fT4 and T3  - Management of probable bronchitis/PNA as per primary team (on CTX/azithro)

## 2023-06-29 NOTE — CONSULT NOTE ADULT - ASSESSMENT
This is a 73M PMH DM, HTN, CAD s/p 3 stents p/w cough productive of green sputum. On holiday from [place]    Pt says the cough started 2 weeks ago.   Has some SOB when coughing, feels like coughing more when laying supine. Denies fever, chills, CP, abdominal pain, urinary symptoms, diarrhea, weight gain or weight loss, LE edema. (26 Jun 2023 21:03)  6/29  s/p diagnostic cath pLAD  w/ R to L collateral, Cx severe diffuse disease, pRCA 90%, RPL  via RRA- CTS Dr Prasad consulted for CABG  Patient seen  and examined in  company of son  daughter in law translating  as per patient request. In no acute distress CP free  -amenable to cardiac surgery workup and surgery. All labs and radiographic images reviewed by   Dr. Prasad. OR date TBD

## 2023-06-29 NOTE — CONSULT NOTE ADULT - PROBLEM SELECTOR RECOMMENDATION 9
likely PNA - CXR with small LLL opacity  -Cough x 2 weeks, rhonchi L base  -Continue with Ceftriaxone, azithromycin  -Check urine legionella   -Start Duoneb q6h  -Start Mucinex 1200mg PO BID x 5 days  -F/u CT chest   -Normoxic, keep sats >90% with o2 PRN
- Debrox 4 gtts BID to Left ear X 3 days.   - Pain meds prn.   - Soft Diet.   - Keep Dry, do not insert anything in the ear  - Try to avoid loud noises/trauma to the ear  - Call with questions.     # [id number]  ENT PA
TELE    cardiac surgery workup labs  asa metoprolol atorvastatin  All labs and radiographic images reviewed by  Dr. Prasad.   OR date TBD    CARE as per  primary team

## 2023-06-29 NOTE — CONSULT NOTE ADULT - PROBLEM SELECTOR RECOMMENDATION 2
-F/u TTE  -Management per cards
followed by Pulmonary Dr Gilmore  Mild right middle lobe groundglass and small right upper lobe ground   glass nodules which maybe inflammatory. Recommend CT chest in 3 months   to see if they persist.

## 2023-06-29 NOTE — PROGRESS NOTE ADULT - SUBJECTIVE AND OBJECTIVE BOX
Gerardo Sotelo MD  Cardiology Fellow  [phone]  All Cardiology service information can be found 24/7 on [email or web address], password: [email or web address]    Patient seen and examined at bedside.  ENT evaluated for decreased hearing in left ear, s/p debrox  Endo following for diabetic needs (A1c 10)  Cath was postponed to today     Review Of Systems: No chest pain, shortness of breath, or palpitations            Current Meds:  albuterol/ipratropium for Nebulization 3 milliLiter(s) Nebulizer every 6 hours  aspirin enteric coated 81 milliGRAM(s) Oral daily  carbamide peroxide Otic Solution 4 Drop(s) Left Ear two times a day  cefTRIAXone   IVPB 1000 milliGRAM(s) IV Intermittent every 24 hours  dextrose 5%. 1000 milliLiter(s) IV Continuous <Continuous>  dextrose 5%. 1000 milliLiter(s) IV Continuous <Continuous>  dextrose 50% Injectable 25 Gram(s) IV Push once  dextrose 50% Injectable 12.5 Gram(s) IV Push once  dextrose 50% Injectable 25 Gram(s) IV Push once  dextrose Oral Gel 15 Gram(s) Oral once PRN  glucagon  Injectable 1 milliGRAM(s) IntraMuscular once  guaiFENesin ER 1200 milliGRAM(s) Oral every 12 hours  heparin   Injectable 5000 Unit(s) SubCutaneous every 12 hours  insulin glargine Injectable (LANTUS) 15 Unit(s) SubCutaneous at bedtime  insulin lispro (ADMELOG) corrective regimen sliding scale   SubCutaneous three times a day before meals  insulin lispro (ADMELOG) corrective regimen sliding scale   SubCutaneous at bedtime  insulin lispro Injectable (ADMELOG) 5 Unit(s) SubCutaneous three times a day before meals  metoprolol tartrate 25 milliGRAM(s) Oral two times a day  sacubitril 49 mG/valsartan 51 mG 1 Tablet(s) Oral two times a day      Vitals:  T(F): 98.4 (06-29), Max: 99.8 (06-28)  HR: 68 (06-29) (68 - 75)  BP: 137/85 (06-29) (116/70 - 158/79)  RR: 18 (06-29)  SpO2: 97% (06-29)  I&O's Summary    28 Jun 2023 07:01  -  29 Jun 2023 07:00  --------------------------------------------------------  IN: 1110 mL / OUT: 0 mL / NET: 1110 mL        Physical Exam:    Appearance: No acute distress; well appearing, cough  Eyes: PERRL, EOMI, pink conjunctiva  HEENT: Normal oral mucosa  Cardiovascular: RRR, S1, S2, no murmurs, rubs, or gallops; no edema; no JVD  Respiratory: Clear to auscultation bilaterally  Gastrointestinal: soft, non-tender, non-distended with normal bowel sounds  Musculoskeletal: No clubbing; no joint deformity   Neurologic: Non-focal  Lymphatic: No lymphadenopathy  Psychiatry: AAOx3, mood & affect appropriate  Skin: No rashes, ecchymoses, or cyanosis                            14.6   7.99  )-----------( 336      ( 29 Jun 2023 07:10 )             43.3     06-28    134<L>  |  96  |  19  ----------------------------<  243<H>  3.9   |  23  |  0.78    Ca    9.4      28 Jun 2023 07:20        CARDIAC MARKERS ( 26 Jun 2023 19:47 )  22 ng/L / x     / x     / x     / x     / x      CARDIAC MARKERS ( 26 Jun 2023 12:00 )  21 ng/L / x     / x     / x     / x     / x            ECG: sinus rhythm, LVH by Sokolov Jung criteria TWI asymmetric inferolaterally    TTE  CONCLUSIONS:   1. Normal left ventricular cavity size. The left ventricular wall thickness is normal. The left ventricular systolic function is severely decreased with an ejection fraction visually estimated at 30 to 35 %. There are regional wall motion abnormalities No evidence of a thrombus in the left ventricle.   2. Multiple segmental abnormalities exist. See findings.   3. There is mild (grade 1) left ventricular diastolic dysfunction, with normal filling pressure.   4. Normal right ventricular cavity size, normal right ventricular wall thickness and normal right ventricular systolic function. The tricuspid annular plane systolic excursion (TAPSE) is 2.1 cm (normal >=1.7 cm).   5. The right atrium is normal.   6. No pericardial effusion seen.   7. No prior echocardiogram is available for comparison.   8. Symmetric mitral valve leaflet tethering.   9. There is normal LV mass and normal geometry.    CT  IMPRESSION:  Bilateral lower lobe dependent atelectasis.  Mild right middle lobe groundglass and small right upper lobe ground   glass nodules which maybe inflammatory. Recommend CT chest in 3 months   to see if they persist.   Gerardo Sotelo MD  Cardiology Fellow  [phone]  All Cardiology service information can be found 24/7 on [email or web address], password: [email or web address]    Patient seen and examined at bedside.  ENT evaluated for decreased hearing in left ear, s/p debrox  Endo following for diabetic needs (A1c 10)  Cath was postponed to today. Pt updated at bedside     Review Of Systems: No chest pain, shortness of breath, or palpitations            Current Meds:  albuterol/ipratropium for Nebulization 3 milliLiter(s) Nebulizer every 6 hours  aspirin enteric coated 81 milliGRAM(s) Oral daily  carbamide peroxide Otic Solution 4 Drop(s) Left Ear two times a day  cefTRIAXone   IVPB 1000 milliGRAM(s) IV Intermittent every 24 hours  dextrose 5%. 1000 milliLiter(s) IV Continuous <Continuous>  dextrose 5%. 1000 milliLiter(s) IV Continuous <Continuous>  dextrose 50% Injectable 25 Gram(s) IV Push once  dextrose 50% Injectable 12.5 Gram(s) IV Push once  dextrose 50% Injectable 25 Gram(s) IV Push once  dextrose Oral Gel 15 Gram(s) Oral once PRN  glucagon  Injectable 1 milliGRAM(s) IntraMuscular once  guaiFENesin ER 1200 milliGRAM(s) Oral every 12 hours  heparin   Injectable 5000 Unit(s) SubCutaneous every 12 hours  insulin glargine Injectable (LANTUS) 15 Unit(s) SubCutaneous at bedtime  insulin lispro (ADMELOG) corrective regimen sliding scale   SubCutaneous three times a day before meals  insulin lispro (ADMELOG) corrective regimen sliding scale   SubCutaneous at bedtime  insulin lispro Injectable (ADMELOG) 5 Unit(s) SubCutaneous three times a day before meals  metoprolol tartrate 25 milliGRAM(s) Oral two times a day  sacubitril 49 mG/valsartan 51 mG 1 Tablet(s) Oral two times a day      Vitals:  T(F): 98.4 (06-29), Max: 99.8 (06-28)  HR: 68 (06-29) (68 - 75)  BP: 137/85 (06-29) (116/70 - 158/79)  RR: 18 (06-29)  SpO2: 97% (06-29)  I&O's Summary    28 Jun 2023 07:01  -  29 Jun 2023 07:00  --------------------------------------------------------  IN: 1110 mL / OUT: 0 mL / NET: 1110 mL        Physical Exam:    Appearance: No acute distress; well appearing, cough  Eyes: PERRL, EOMI, pink conjunctiva  HEENT: Normal oral mucosa  Cardiovascular: RRR, S1, S2, no murmurs, rubs, or gallops; no edema; no JVD  Respiratory: Clear to auscultation bilaterally  Gastrointestinal: soft, non-tender, non-distended with normal bowel sounds  Musculoskeletal: No clubbing; no joint deformity   Neurologic: Non-focal  Lymphatic: No lymphadenopathy  Psychiatry: AAOx3, mood & affect appropriate  Skin: No rashes, ecchymoses, or cyanosis                            14.6   7.99  )-----------( 336      ( 29 Jun 2023 07:10 )             43.3     06-28    134<L>  |  96  |  19  ----------------------------<  243<H>  3.9   |  23  |  0.78    Ca    9.4      28 Jun 2023 07:20        CARDIAC MARKERS ( 26 Jun 2023 19:47 )  22 ng/L / x     / x     / x     / x     / x      CARDIAC MARKERS ( 26 Jun 2023 12:00 )  21 ng/L / x     / x     / x     / x     / x            ECG: sinus rhythm, LVH by Sokolov Jung criteria TWI asymmetric inferolaterally    TTE  CONCLUSIONS:   1. Normal left ventricular cavity size. The left ventricular wall thickness is normal. The left ventricular systolic function is severely decreased with an ejection fraction visually estimated at 30 to 35 %. There are regional wall motion abnormalities No evidence of a thrombus in the left ventricle.   2. Multiple segmental abnormalities exist. See findings.   3. There is mild (grade 1) left ventricular diastolic dysfunction, with normal filling pressure.   4. Normal right ventricular cavity size, normal right ventricular wall thickness and normal right ventricular systolic function. The tricuspid annular plane systolic excursion (TAPSE) is 2.1 cm (normal >=1.7 cm).   5. The right atrium is normal.   6. No pericardial effusion seen.   7. No prior echocardiogram is available for comparison.   8. Symmetric mitral valve leaflet tethering.   9. There is normal LV mass and normal geometry.    CT  IMPRESSION:  Bilateral lower lobe dependent atelectasis.  Mild right middle lobe groundglass and small right upper lobe ground   glass nodules which maybe inflammatory. Recommend CT chest in 3 months   to see if they persist.

## 2023-06-29 NOTE — CONSULT NOTE ADULT - ASSESSMENT
72 YO mostly Khmer speaking M with PMH of DM, HTN, CAD s/p 3 stents. Presents with cough for the past 2 weeks. CXR with small L basilar opacity.  ENT was consulted for evaluation of left ear pain. Per pt, pain started 1 week ago, no eardrops used. Reports no discharge from the left ear with associated decreased hearing from the left ear. No reported fever/chills. On Exam:  + Cerumen impaction. No fluid drainage,  No EAC Erythema. Unable to visualize TM idue to cerumen. No mastoid tenderness, erythema, or ear bulging. WBC: 8.14, Afebrile.    74 YO mostly Thai speaking M with PMH of DM, HTN, CAD s/p 3 stents. Presents with cough for the past 2 weeks. CXR with small L basilar opacity.  ENT was consulted for evaluation of left ear pain. Per pt, pain started 1 week ago, no eardrops used. Reports no discharge from the left ear with associated decreased hearing from the left ear. No reported fever/chills. On Exam:  + Cerumen impaction. No fluid drainage,  No EAC Erythema. Unable to visualize TM due to cerumen. No mastoid tenderness, erythema, or ear bulging. WBC: 8.14, Afebrile.

## 2023-06-29 NOTE — CHART NOTE - NSCHARTNOTEFT_GEN_A_CORE
Unable to see patient today since he was in cath lab for [facility].     This is a 72 yo M /w a PMH of DM2, HTN, CAD /w 3 stents presents with pneumonia. Patient also found to have EF of 30% as well as an HbA1c of 10%. Endocrinology consulted for diabetes management.    #DM2 /w HbA1c 10%  -Patient started on Lantus 15 units with admelog 5 units TID with meals yesterday.   - BG noted to be in the 200s this AM   Plan:  - Increase Lantus to 18 units tonight   - Increase Admelog to 6 units before meals.   - Low admelog correction scale before meals and bedtime   -carb consistent diet  -FSG before meals and before bedtime with goal -180  -RD consult  -hypoglycemia protocol in place    Ana Kahn DO   Attending Physician   Department of Endocrinology, Diabetes and Metabolism     If before 9AM or after 5PM, or on weekends/holidays, please call the Endocrine answering service for assistance ([phone]).  For nonurgent matters, please email [email or web address] for assistance.

## 2023-06-29 NOTE — PROGRESS NOTE ADULT - SUBJECTIVE AND OBJECTIVE BOX
DATE OF SERVICE: 06-29-23 @ 11:01  CHIEF COMPLAINT:Patient is a 73y old  Male who presents with a chief complaint of pneumonia (26 Jun 2023 14:31)    	        PAST MEDICAL & SURGICAL HISTORY:  HTN (hypertension)      DM (diabetes mellitus)              REVIEW OF SYSTEMS:  CONSTITUTIONAL: No fever, weight loss, or fatigue    RESPIRATORY: No cough, wheezing, chills or hemoptysis; No Shortness of Breath  CARDIOVASCULAR: No chest pain, palpitations, passing out, dizziness, or leg swelling  GASTROINTESTINAL: No abdominal or epigastric pain. No nausea, vomiting, or hematemesis; No diarrhea or constipation. No melena or hematochezia.  GENITOURINARY: No dysuria, frequency, hematuria, or incontinence  NEUROLOGICAL: No headaches,     Medications:  MEDICATIONS  (STANDING):  albuterol/ipratropium for Nebulization 3 milliLiter(s) Nebulizer every 6 hours  aspirin enteric coated 81 milliGRAM(s) Oral daily  carbamide peroxide Otic Solution 4 Drop(s) Left Ear two times a day  cefTRIAXone   IVPB 1000 milliGRAM(s) IV Intermittent every 24 hours  dextrose 5%. 1000 milliLiter(s) (100 mL/Hr) IV Continuous <Continuous>  dextrose 5%. 1000 milliLiter(s) (50 mL/Hr) IV Continuous <Continuous>  dextrose 50% Injectable 25 Gram(s) IV Push once  dextrose 50% Injectable 12.5 Gram(s) IV Push once  dextrose 50% Injectable 25 Gram(s) IV Push once  glucagon  Injectable 1 milliGRAM(s) IntraMuscular once  guaiFENesin ER 1200 milliGRAM(s) Oral every 12 hours  heparin   Injectable 5000 Unit(s) SubCutaneous every 12 hours  insulin glargine Injectable (LANTUS) 15 Unit(s) SubCutaneous at bedtime  insulin lispro (ADMELOG) corrective regimen sliding scale   SubCutaneous three times a day before meals  insulin lispro (ADMELOG) corrective regimen sliding scale   SubCutaneous at bedtime  insulin lispro Injectable (ADMELOG) 5 Unit(s) SubCutaneous three times a day before meals  metoprolol succinate ER 50 milliGRAM(s) Oral daily  sacubitril 49 mG/valsartan 51 mG 1 Tablet(s) Oral two times a day    MEDICATIONS  (PRN):  dextrose Oral Gel 15 Gram(s) Oral once PRN Blood Glucose LESS THAN 70 milliGRAM(s)/deciliter    	    PHYSICAL EXAM:  T(C): 36.6 (06-29-23 @ 10:47), Max: 37.7 (06-28-23 @ 22:30)  HR: 69 (06-29-23 @ 10:47) (68 - 74)  BP: 125/71 (06-29-23 @ 10:47) (116/70 - 158/79)  RR: 18 (06-29-23 @ 10:47) (18 - 18)  SpO2: 98% (06-29-23 @ 10:47) (95% - 98%)  Wt(kg): --  I&O's Summary    28 Jun 2023 07:01  -  29 Jun 2023 07:00  --------------------------------------------------------  IN: 1110 mL / OUT: 0 mL / NET: 1110 mL          Cardiovascular: Normal S1 S2,   Respiratory: Lungs clear to auscultation	  Psychiatry: A & O x 3, Mood & affect appropriate  Gastrointestinal:  Soft, Non-tender, + BS	  	  Neurologic: Non-focal  Extremities: Normal range of motion,     TELEMETRY: 	    ECG:  	  RADIOLOGY:  OTHER: 	  	  LABS:	 	    CARDIAC MARKERS:                                14.6   7.99  )-----------( 336      ( 29 Jun 2023 07:10 )             43.3     06-29    135  |  101  |  22  ----------------------------<  229<H>  3.9   |  21<L>  |  0.85    Ca    9.7      29 Jun 2023 07:18  Phos  3.3     06-29  Mg     2.3     06-29      proBNP:   Lipid Profile:   HgA1c:   TSH:

## 2023-06-29 NOTE — CHART NOTE - NSCHARTNOTEFT_GEN_A_CORE
Patient is a 73y old  Male who presents with a chief complaint of SOB (29 Jun 2023 17:54)    Notified by RN, patient complaining of nausea. Patient seen and examined at bedside using Tristanian speaking , #[id number]. Patient states that he had three episodes of non bilious/non bloody vomiting today. Patient admits to poor PO intake throughout the day. Patient admits to slight abdominal discomfort and dizziness. Patient denies diarrhea, chest pain, shortness of breath, headache or vision changes.       Vital Signs Last 24 Hrs  T(C): 36.6 (29 Jun 2023 17:10), Max: 37.7 (28 Jun 2023 22:30)  T(F): 97.9 (29 Jun 2023 17:10), Max: 99.8 (28 Jun 2023 22:30)  HR: 58 (29 Jun 2023 17:10) (58 - 76)  BP: 166/76 (29 Jun 2023 17:10) (125/71 - 175/83)  BP(mean): --  RR: 18 (29 Jun 2023 17:10) (14 - 18)  SpO2: 97% (29 Jun 2023 17:10) (92% - 98%)    Parameters below as of 29 Jun 2023 17:10  Patient On (Oxygen Delivery Method): room air        Physical Exam:  General: WN/WD NAD  Neurology: A&Ox3  Head:  Normocephalic, atraumatic  Abdominal: Soft, NT, ND no palpable mass    Labs:                          14.6   7.99  )-----------( 336      ( 29 Jun 2023 07:10 )             43.3     06-29    135  |  101  |  22  ----------------------------<  229<H>  3.9   |  21<L>  |  0.85    Ca    9.7      29 Jun 2023 07:18  Phos  3.3     06-29  Mg     2.3     06-29              Radiology:    HPI:  73M PMH DM, HTN, CAD s/p 3 stents p/w cough productive of green sputum.    Pt says the cough started 2 weeks ago. He came from [place] 1 month ago.   Has some SOB when coughing, feels like coughing more when laying supine. Denies fever, chills, CP, abdominal pain, urinary symptoms, diarrhea, weight gain or weight loss, LE edema. (26 Jun 2023 21:03)    A/P: Notified by RN, patient complaining of nausea with 3 episodes of emesis. Emesis non bloody/non bilious. Patient seen and examined at bedside using . Patient states that he has slight abdominal discomfort, abdominal exam negative. Patient admits to dizziness, likely 2/2 volume loss along with poor PO intake.      #Nausea  > Patient prescribed IV Zofran   > Patient hypertensive, likely 2/2 volume depletion and ?vomiting PO antihypertensive, however, given EF 25% will refrain from IV fluids. D/w RN to encourage PO intake after administering Zofran  > Would consider CT abdomen if nausea/vomiting does not resolve   > Will continue to monitor  > Will endorse to day team     Eveline Corbett PA-C  Internal Medicine   [id number]

## 2023-06-29 NOTE — CONSULT NOTE ADULT - SUBJECTIVE AND OBJECTIVE BOX
History of Present Illness:   This is a 73M PMH DM, HTN, CAD s/p 3 stents p/w cough productive of green sputum.    Pt says the cough started 2 weeks ago. He came from [place] 1 month ago.   Has some SOB when coughing, feels like coughing more when laying supine. Denies fever, chills, CP, abdominal pain, urinary symptoms, diarrhea, weight gain or weight loss, LE edema. (26 Jun 2023 21:03)  6/29  s/p diagnostic cath pLAD  w/ R to L collateral, Cx severe diffuse disease, pRCA 90%, RPL  via RRA- CTS Dr Prasad consulted for CABG    CT Surgery consulted for CABG    Past Medical History  HTN (hypertension)    DM (diabetes mellitus)        Past Surgical History      MEDICATIONS  (STANDING):  albuterol/ipratropium for Nebulization 3 milliLiter(s) Nebulizer every 6 hours  aspirin enteric coated 81 milliGRAM(s) Oral daily  carbamide peroxide Otic Solution 4 Drop(s) Left Ear two times a day  cefTRIAXone   IVPB 1000 milliGRAM(s) IV Intermittent every 24 hours  dextrose 5%. 1000 milliLiter(s) (100 mL/Hr) IV Continuous <Continuous>  dextrose 5%. 1000 milliLiter(s) (50 mL/Hr) IV Continuous <Continuous>  dextrose 50% Injectable 25 Gram(s) IV Push once  dextrose 50% Injectable 12.5 Gram(s) IV Push once  dextrose 50% Injectable 25 Gram(s) IV Push once  glucagon  Injectable 1 milliGRAM(s) IntraMuscular once  guaiFENesin ER 1200 milliGRAM(s) Oral every 12 hours  heparin   Injectable 5000 Unit(s) SubCutaneous every 12 hours  insulin glargine Injectable (LANTUS) 18 Unit(s) SubCutaneous at bedtime  insulin lispro (ADMELOG) corrective regimen sliding scale   SubCutaneous three times a day before meals  insulin lispro (ADMELOG) corrective regimen sliding scale   SubCutaneous at bedtime  insulin lispro Injectable (ADMELOG) 6 Unit(s) SubCutaneous three times a day before meals  metoprolol succinate ER 50 milliGRAM(s) Oral daily  sacubitril 49 mG/valsartan 51 mG 1 Tablet(s) Oral two times a day    MEDICATIONS  (PRN):  dextrose Oral Gel 15 Gram(s) Oral once PRN Blood Glucose LESS THAN 70 milliGRAM(s)/deciliter    Vital Signs Last 24 Hrs  T(C): 36.6 (06-29-23 @ 12:37), Max: 37.7 (06-28-23 @ 22:30)  T(F): 97.8 (06-29-23 @ 12:37), Max: 99.8 (06-28-23 @ 22:30)  HR: 64 (06-29-23 @ 16:25) (62 - 76)  BP: 144/77 (06-29-23 @ 16:25) (125/71 - 175/83)  RR: 14 (06-29-23 @ 16:25) (14 - 18)  SpO2: 93% (06-29-23 @ 16:25) (92% - 98%)           Admit Wt: Drug Dosing Weight  Height (cm): 154.9 (26 Jun 2023 10:45)  Weight (kg): 59.9 (26 Jun 2023 10:45)  BMI (kg/m2): 25 (26 Jun 2023 10:45)  BSA (m2): 1.58 (26 Jun 2023 10:45)    Allergies: No Known Allergies      SOCIAL HISTORY: retired   lives in [place]  Smoker: [ ] Yes  [ x] No        PACK YEARS:                         WHEN QUIT?  ETOH use: [ ] Yes  [ x] No              FREQUENCY / QUANTITY:  Ilicit Drug use:  [ ] Yes  [ x] No      Relevant Family History  FAMILY HISTORY:      Review of Systems  GENERAL:  no weakness, fatigue, fevers or chills  NEURO: no dizziness, vertigo or fainting, numbness, tingling or weakness  SKIN: no itching, burning, rashes, or lesions   HEENT: no visual changes;  no headache, no vertigo, no recent colds endorsees pain  RESPIRATORY:  endorses  shortness of breath, no cough,  denie sputum, wheezing, hemoptysis;   CARDIOVASCULAR:  no chest pain,  or palpitations  GI: no abdominal or epigastric pain. no heartburn, nausea, vomiting, or hematemesis; no diarrhea or constipation. no melena  PERIPHERAL VASCULAR: no swelling, no tenderness, no erythema, no intermittent claudication, leg cramps, no varicose veins     PHYSICAL EXAM  General: Well nourished, well developed, NAD.                                              Neuro: Normal exam oriented to person/place & time with no focal motor or sensory  deficits.                    Eyes: Normal exam of conjunctiva & lids, pupils equally reactive.   ENT: Normal exam of nasal/oral mucosa with absence of cyanosis.   Neck: Normal exam of jugular veins, trachea & thyroid.   Chest: Normal lung exam with good air movement absence of wheezes, rales, or rhonchi:                                                                          CV:  Auscultation: normal S1S2, Ir egular   Murmurs   Carotids: No Bruits[ ]  Abdominal Aorta: normal [ ] nonpalpable[ ]                                                                         GI: Normal exam of abdomen with no noted masses or tenderness. +[id number]                                                                                            Extremities: Normal no evidence of cyanosis or deformity, Edema:   Lower Extremity Pulses: Right[x ] Left[x ]Varicosities[- ]  SKIN : Normal exam to inspection & palpation.                                                               T(C): 36.6 (06-29-23 @ 12:37), Max: 37.7 (06-28-23 @ 22:30)  T(F): 97.8 (06-29-23 @ 12:37), Max: 99.8 (06-28-23 @ 22:30)  HR: 64 (06-29-23 @ 16:25) (62 - 76)  BP: 144/77 (06-29-23 @ 16:25) (125/71 - 175/83)  RR: 14 (06-29-23 @ 16:25) (14 - 18)  SpO2: 93% (06-29-23 @ 16:25) (92% - 98%)  LABS:                        14.6   7.99  )-----------( 336      ( 29 Jun 2023 07:10 )             43.3     06-29    135  |  101  |  22  ----------------------------<  229<H>  3.9   |  21<L>  |  0.85    Ca    9.7      29 Jun 2023 07:18  Phos  3.3     06-29  Mg     2.3     06-29            Cardiac Cath: pLAD  w/ R to L collateral, Cx severe diffuse disease, pRCA 90%, RPL  via RRA-        TTE / BREEZY:< from: TTE W or WO Ultrasound Enhancing Agent (06.27.23 @ 14:05) >  1. Normal left ventricular cavity size. The left ventricular wall thickness is normal. The left ventricular systolic function is severely decreased with an ejection fraction visually estimated at 30 to 35 %. There are regional wall motion abnormalities No evidence of a thrombus in the left ventricle.   2. Multiple segmental abnormalities exist. See findings.   3. There is mild (grade 1) left ventricular diastolic dysfunction, with normal filling pressure.   4. Normal right ventricular cavity size, normal right ventricular wall thickness and normal right ventricular systolic function. The tricuspid annular plane systolic excursion (TAPSE) is 2.1 cm (normal >=1.7 cm).   5. The right atrium is normal.   6. No pericardial effusion seen.   7. No prior echocardiogram is available for comparison.   8. Symmetric mitral valve leaflet tethering.   9. There is normal LV mass and normal geometry.    < end of copied text >    < from: CT Chest No Cont (06.27.23 @ 11:01) >  FINDINGS:    LYMPH NODES: No lymphadenopathy.    HEART/VASCULATURE: The heart is normal in size. Aortic and coronary   artery calcifications. No pericardial effusion. Bilateral partially   calcified pleural plaques compatible with asbestos exposure.    AIRWAYS/LUNGS/PLEURA: The central airways are patent. Bilateral lower   lobe dependent linear opacities. Right upper lobe subsolid nodule   measures 6 mm (3-47). Right upper lobe perifissural ground glass nodule   measures 5 mm (7-144, 3-60). Mild peribronchovascular groundglass in the   lateral segment of the right middle lobe. No pleural effusion or   pneumothorax.    UPPER ABDOMEN: Unremarkable.    BONES/SOFT TISSUES: Mild degenerative changes.    IMPRESSION:    Bilateral lower lobe dependent atelectasis.    Mild right middle lobe groundglass and small right upper lobe ground   glass nodules which maybe inflammatory. Recommend CT chest in 3 months   to see if they persist.      < end of copied text >

## 2023-06-29 NOTE — PROGRESS NOTE ADULT - ASSESSMENT
73M with CAD s/p 3 stents, HTN, T2DM, admitted for pneumonia, cardiology consulted for ST depressions and LVH noted on EKG.      ST Depressions    Hypertensive urgency  improved    - F/u  TTE noted  cath today /cancelled yesterday  c/w meds    likely pna  bronchitis  abs as per pulm  nebs  pulm eval  noted  cards f/u noted  dm  fsg riss  endo f/u  dvt proph

## 2023-06-30 LAB
ANION GAP SERPL CALC-SCNC: 15 MMOL/L — SIGNIFICANT CHANGE UP (ref 5–17)
APPEARANCE UR: CLEAR — SIGNIFICANT CHANGE UP
BILIRUB UR-MCNC: NEGATIVE — SIGNIFICANT CHANGE UP
BUN SERPL-MCNC: 20 MG/DL — SIGNIFICANT CHANGE UP (ref 7–23)
CALCIUM SERPL-MCNC: 9.4 MG/DL — SIGNIFICANT CHANGE UP (ref 8.4–10.5)
CHLORIDE SERPL-SCNC: 100 MMOL/L — SIGNIFICANT CHANGE UP (ref 96–108)
CO2 SERPL-SCNC: 21 MMOL/L — LOW (ref 22–31)
COLOR SPEC: SIGNIFICANT CHANGE UP
CREAT SERPL-MCNC: 0.68 MG/DL — SIGNIFICANT CHANGE UP (ref 0.5–1.3)
DIFF PNL FLD: NEGATIVE — SIGNIFICANT CHANGE UP
EGFR: 97 ML/MIN/1.73M2 — SIGNIFICANT CHANGE UP
GLUCOSE BLDC GLUCOMTR-MCNC: 164 MG/DL — HIGH (ref 70–99)
GLUCOSE BLDC GLUCOMTR-MCNC: 191 MG/DL — HIGH (ref 70–99)
GLUCOSE BLDC GLUCOMTR-MCNC: 210 MG/DL — HIGH (ref 70–99)
GLUCOSE BLDC GLUCOMTR-MCNC: 262 MG/DL — HIGH (ref 70–99)
GLUCOSE SERPL-MCNC: 244 MG/DL — HIGH (ref 70–99)
GLUCOSE UR QL: ABNORMAL
KETONES UR-MCNC: ABNORMAL
LEUKOCYTE ESTERASE UR-ACNC: NEGATIVE — SIGNIFICANT CHANGE UP
MAGNESIUM SERPL-MCNC: 2.1 MG/DL — SIGNIFICANT CHANGE UP (ref 1.6–2.6)
MRSA PCR RESULT.: SIGNIFICANT CHANGE UP
NITRITE UR-MCNC: NEGATIVE — SIGNIFICANT CHANGE UP
PH UR: 6.5 — SIGNIFICANT CHANGE UP (ref 5–8)
PHOSPHATE SERPL-MCNC: 3.8 MG/DL — SIGNIFICANT CHANGE UP (ref 2.5–4.5)
POTASSIUM SERPL-MCNC: 4 MMOL/L — SIGNIFICANT CHANGE UP (ref 3.5–5.3)
POTASSIUM SERPL-SCNC: 4 MMOL/L — SIGNIFICANT CHANGE UP (ref 3.5–5.3)
PROT UR-MCNC: ABNORMAL
S AUREUS DNA NOSE QL NAA+PROBE: SIGNIFICANT CHANGE UP
SODIUM SERPL-SCNC: 136 MMOL/L — SIGNIFICANT CHANGE UP (ref 135–145)
SP GR SPEC: 1.03 — HIGH (ref 1.01–1.02)
UROBILINOGEN FLD QL: NEGATIVE — SIGNIFICANT CHANGE UP

## 2023-06-30 PROCEDURE — 93880 EXTRACRANIAL BILAT STUDY: CPT | Mod: 26

## 2023-06-30 PROCEDURE — 99233 SBSQ HOSP IP/OBS HIGH 50: CPT | Mod: GC

## 2023-06-30 PROCEDURE — 99233 SBSQ HOSP IP/OBS HIGH 50: CPT

## 2023-06-30 RX ORDER — ACETAMINOPHEN 500 MG
650 TABLET ORAL ONCE
Refills: 0 | Status: COMPLETED | OUTPATIENT
Start: 2023-06-30 | End: 2023-06-30

## 2023-06-30 RX ADMIN — Medication 3 MILLILITER(S): at 05:51

## 2023-06-30 RX ADMIN — Medication 3 MILLILITER(S): at 18:13

## 2023-06-30 RX ADMIN — SACUBITRIL AND VALSARTAN 1 TABLET(S): 24; 26 TABLET, FILM COATED ORAL at 05:50

## 2023-06-30 RX ADMIN — Medication 1200 MILLIGRAM(S): at 18:13

## 2023-06-30 RX ADMIN — INSULIN GLARGINE 18 UNIT(S): 100 INJECTION, SOLUTION SUBCUTANEOUS at 22:32

## 2023-06-30 RX ADMIN — Medication 50 MILLIGRAM(S): at 05:52

## 2023-06-30 RX ADMIN — Medication 1: at 18:12

## 2023-06-30 RX ADMIN — Medication 1: at 10:43

## 2023-06-30 RX ADMIN — HEPARIN SODIUM 5000 UNIT(S): 5000 INJECTION INTRAVENOUS; SUBCUTANEOUS at 18:13

## 2023-06-30 RX ADMIN — Medication 650 MILLIGRAM(S): at 22:32

## 2023-06-30 RX ADMIN — HEPARIN SODIUM 5000 UNIT(S): 5000 INJECTION INTRAVENOUS; SUBCUTANEOUS at 05:48

## 2023-06-30 RX ADMIN — Medication 81 MILLIGRAM(S): at 12:06

## 2023-06-30 RX ADMIN — CEFTRIAXONE 100 MILLIGRAM(S): 500 INJECTION, POWDER, FOR SOLUTION INTRAMUSCULAR; INTRAVENOUS at 23:49

## 2023-06-30 RX ADMIN — Medication 6 UNIT(S): at 10:43

## 2023-06-30 RX ADMIN — CEFTRIAXONE 100 MILLIGRAM(S): 500 INJECTION, POWDER, FOR SOLUTION INTRAMUSCULAR; INTRAVENOUS at 00:20

## 2023-06-30 RX ADMIN — Medication 6 UNIT(S): at 18:12

## 2023-06-30 RX ADMIN — CARBAMIDE PEROXIDE 4 DROP(S): 81.86 SOLUTION/ DROPS AURICULAR (OTIC) at 18:13

## 2023-06-30 RX ADMIN — Medication 1200 MILLIGRAM(S): at 05:49

## 2023-06-30 RX ADMIN — SACUBITRIL AND VALSARTAN 1 TABLET(S): 24; 26 TABLET, FILM COATED ORAL at 18:16

## 2023-06-30 RX ADMIN — Medication 3: at 12:07

## 2023-06-30 RX ADMIN — Medication 3 MILLILITER(S): at 23:49

## 2023-06-30 RX ADMIN — CARBAMIDE PEROXIDE 4 DROP(S): 81.86 SOLUTION/ DROPS AURICULAR (OTIC) at 05:48

## 2023-06-30 RX ADMIN — Medication 650 MILLIGRAM(S): at 23:32

## 2023-06-30 RX ADMIN — Medication 6 UNIT(S): at 12:07

## 2023-06-30 RX ADMIN — Medication 3 MILLILITER(S): at 12:06

## 2023-06-30 NOTE — PROGRESS NOTE ADULT - ASSESSMENT
73M with CAD s/p 3 stents, HTN, T2DM, admitted for pneumonia, cardiology consulted for ST depressions and LVH noted on EKG.  This is not ACS.  However, with echo showing down ef to 30's and segmental WMA. Stage B. Will pursue ischemic work up w/ diagnostic LHC     #ST Depressions  #LVH  #Hypertensive urgency  #HFrEF w/ Focal WMA    - TTE w/ EF 30-35%. Stage B, Killips 1. Continue entresto 49/51mg bid, metop succinate 50mg daily. Will consider further uptitration of entresto/addition of MCRA in coming days  - [facility] with triple vessel disease. CTS consulted. Awaiting carotid duplexes. OR date TD   - Aspirin, start atrova 80   - A1c 10,  HDL 43 Cholesterol 183 tgl 136. Thyroid function c/w euthyroid sick syndrome   - Management of probable bronchitis/PNA as per primary team (on CTX). CT changes (RUL GG nodules) to be repeated in 3 months   73M with CAD s/p 3 stents, HTN, T2DM, admitted for pneumonia, cardiology consulted for ST depressions and LVH noted on EKG.  This is not ACS.  However, with echo showing down ef to 30's and segmental WMA. Stage B. Will pursue ischemic work up w/ diagnostic LHC     #ST Depressions  #LVH  #Hypertensive urgency  #HFrEF w/ Focal WMA    - TTE w/ EF 30-35%. Stage B, Killips 1. Continue entresto 49/51mg bid, metop succinate 50mg daily. Will consider further uptitration of entresto/addition of MCRA in coming days  - [facility] with triple vessel disease. CTS consulted. Awaiting carotid duplexes. Unlikely CTS candidate, will discuss w/ Dr. Hernandez for complex PCI, possible cMRI for viability  - Aspirin, start atrova 80   - A1c 10,  HDL 43 Cholesterol 183 tgl 136. Thyroid function c/w euthyroid sick syndrome   - Management of probable bronchitis/PNA as per primary team (on CTX). CT changes (RUL GG nodules) to be repeated in 3 months

## 2023-06-30 NOTE — PROGRESS NOTE ADULT - ASSESSMENT
72 y/o mostly Vietnamese speaking M with PMH of DM, HTN, CAD s/p 3 stents. Presents with cough for the past 2 weeks. CXR with small L basilar opacity. Also noted to be in hypertensive urgency in ED.

## 2023-06-30 NOTE — PROGRESS NOTE ADULT - SUBJECTIVE AND OBJECTIVE BOX
DATE OF SERVICE: 06-30-23 @ 07:38  CHIEF COMPLAINT:Patient is a 73y old  Male who presents with a chief complaint of SOB (29 Jun 2023 17:54)    	        PAST MEDICAL & SURGICAL HISTORY:  HTN (hypertension)      DM (diabetes mellitus)              REVIEW OF SYSTEMS:    NECK: No pain or stiffness  RESPIRATORY: No cough, wheezing, chills or hemoptysis; No Shortness of Breath  CARDIOVASCULAR: No chest pain, palpitations, passing out,   GASTROINTESTINAL: No abdominal or epigastric pain.   GENITOURINARY: No dysuria, frequency, hematuria, or incontinence  NEUROLOGICAL: No headaches,     Medications:  MEDICATIONS  (STANDING):  albuterol/ipratropium for Nebulization 3 milliLiter(s) Nebulizer every 6 hours  aspirin enteric coated 81 milliGRAM(s) Oral daily  carbamide peroxide Otic Solution 4 Drop(s) Left Ear two times a day  cefTRIAXone   IVPB 1000 milliGRAM(s) IV Intermittent every 24 hours  dextrose 5%. 1000 milliLiter(s) (100 mL/Hr) IV Continuous <Continuous>  dextrose 5%. 1000 milliLiter(s) (50 mL/Hr) IV Continuous <Continuous>  dextrose 50% Injectable 25 Gram(s) IV Push once  dextrose 50% Injectable 12.5 Gram(s) IV Push once  dextrose 50% Injectable 25 Gram(s) IV Push once  glucagon  Injectable 1 milliGRAM(s) IntraMuscular once  guaiFENesin ER 1200 milliGRAM(s) Oral every 12 hours  heparin   Injectable 5000 Unit(s) SubCutaneous every 12 hours  insulin glargine Injectable (LANTUS) 18 Unit(s) SubCutaneous at bedtime  insulin lispro (ADMELOG) corrective regimen sliding scale   SubCutaneous three times a day before meals  insulin lispro (ADMELOG) corrective regimen sliding scale   SubCutaneous at bedtime  insulin lispro Injectable (ADMELOG) 6 Unit(s) SubCutaneous three times a day before meals  metoprolol succinate ER 50 milliGRAM(s) Oral daily  sacubitril 49 mG/valsartan 51 mG 1 Tablet(s) Oral two times a day    MEDICATIONS  (PRN):  dextrose Oral Gel 15 Gram(s) Oral once PRN Blood Glucose LESS THAN 70 milliGRAM(s)/deciliter    	    PHYSICAL EXAM:  T(C): 36.6 (06-30-23 @ 05:24), Max: 36.6 (06-29-23 @ 10:47)  HR: 77 (06-30-23 @ 05:24) (58 - 93)  BP: 126/74 (06-30-23 @ 05:24) (125/71 - 180/98)  RR: 18 (06-30-23 @ 05:24) (14 - 18)  SpO2: 95% (06-30-23 @ 05:24) (92% - 98%)  Wt(kg): --  I&O's Summary    29 Jun 2023 07:01  -  30 Jun 2023 07:00  --------------------------------------------------------  IN: 380 mL / OUT: 500 mL / NET: -120 mL        Appearance: Normal	    Cardiovascular: Normal S1 S2, No JVD,  Respiratory: Lungs clear to auscultation	  Psychiatry: A & O  Gastrointestinal:  Soft, Non-tender, + BS	  	  Neurologic: Non-focal  Extremities: Normal range of motion    TELEMETRY: 	    ECG:  	  RADIOLOGY:  OTHER: 	  	  LABS:	 	    CARDIAC MARKERS:                                14.6   7.99  )-----------( 336      ( 29 Jun 2023 07:10 )             43.3     06-29    135  |  101  |  22  ----------------------------<  229<H>  3.9   |  21<L>  |  0.85    Ca    9.7      29 Jun 2023 07:18  Phos  3.3     06-29  Mg     2.3     06-29      proBNP:   Lipid Profile:   HgA1c:   TSH: Thyroid Stimulating Hormone, Serum: 4.73 uIU/mL (06-29 @ 19:11)

## 2023-06-30 NOTE — PROGRESS NOTE ADULT - ASSESSMENT
73M with CAD s/p 3 stents, HTN, T2DM, admitted for pneumonia, cardiology consulted for ST depressions and LVH noted on EKG.      ST Depressions    Hypertensive urgency  improved    - F/u  TTE noted  cath noted  triple vessel   cts sx eval   carotids   c/w meds    likely pna  bronchitis  abs as per pulm  nebs  pulm eval  noted  cards f/u noted  dm  fsg riss  endo f/u  dvt proph

## 2023-06-30 NOTE — PROGRESS NOTE ADULT - PROBLEM SELECTOR PLAN 1
acute bronchitis - CT chest with no clear PNA, few GGO/nodules  -Small L basilar opacity seen on CXR corresponds to atelectasis seen on CT chest   -Cough x 2 weeks, rhonchi L base (now improving)  -Ceftriaxone x 5 days   -Continue Duoneb q6h  -Continue Mucinex 1200mg PO BID x 5 days  -Normoxic, keep sats >90% with o2 PRN.

## 2023-06-30 NOTE — PROGRESS NOTE ADULT - SUBJECTIVE AND OBJECTIVE BOX
Follow-up Pulm Progress Note    feeling better overall   denies SOB/CP        Medications:  MEDICATIONS  (STANDING):  albuterol/ipratropium for Nebulization 3 milliLiter(s) Nebulizer every 6 hours  aspirin enteric coated 81 milliGRAM(s) Oral daily  carbamide peroxide Otic Solution 4 Drop(s) Left Ear two times a day  cefTRIAXone   IVPB 1000 milliGRAM(s) IV Intermittent every 24 hours  dextrose 5%. 1000 milliLiter(s) (100 mL/Hr) IV Continuous <Continuous>  dextrose 5%. 1000 milliLiter(s) (50 mL/Hr) IV Continuous <Continuous>  dextrose 50% Injectable 25 Gram(s) IV Push once  dextrose 50% Injectable 12.5 Gram(s) IV Push once  dextrose 50% Injectable 25 Gram(s) IV Push once  glucagon  Injectable 1 milliGRAM(s) IntraMuscular once  guaiFENesin ER 1200 milliGRAM(s) Oral every 12 hours  heparin   Injectable 5000 Unit(s) SubCutaneous every 12 hours  insulin glargine Injectable (LANTUS) 18 Unit(s) SubCutaneous at bedtime  insulin lispro (ADMELOG) corrective regimen sliding scale   SubCutaneous three times a day before meals  insulin lispro (ADMELOG) corrective regimen sliding scale   SubCutaneous at bedtime  insulin lispro Injectable (ADMELOG) 6 Unit(s) SubCutaneous three times a day before meals  metoprolol succinate ER 50 milliGRAM(s) Oral daily  sacubitril 49 mG/valsartan 51 mG 1 Tablet(s) Oral two times a day    MEDICATIONS  (PRN):  dextrose Oral Gel 15 Gram(s) Oral once PRN Blood Glucose LESS THAN 70 milliGRAM(s)/deciliter          Vital Signs Last 24 Hrs  T(C): 36.8 (30 Jun 2023 13:20), Max: 36.8 (30 Jun 2023 13:20)  T(F): 98.3 (30 Jun 2023 13:20), Max: 98.3 (30 Jun 2023 13:20)  HR: 82 (30 Jun 2023 13:20) (58 - 93)  BP: 120/74 (30 Jun 2023 13:20) (120/74 - 180/98)  BP(mean): --  RR: 18 (30 Jun 2023 13:20) (14 - 18)  SpO2: 95% (30 Jun 2023 13:20) (92% - 97%)    Parameters below as of 30 Jun 2023 13:20  Patient On (Oxygen Delivery Method): room air              06-29 @ 07:01  -  06-30 @ 07:00  --------------------------------------------------------  IN: 380 mL / OUT: 500 mL / NET: -120 mL          LABS:                        14.6   7.99  )-----------( 336      ( 29 Jun 2023 07:10 )             43.3     06-30    136  |  100  |  20  ----------------------------<  244<H>  4.0   |  21<L>  |  0.68    Ca    9.4      30 Jun 2023 07:22  Phos  3.8     06-30  Mg     2.1     06-30            CAPILLARY BLOOD GLUCOSE      POCT Blood Glucose.: 262 mg/dL (30 Jun 2023 11:57)      Urinalysis Basic - ( 30 Jun 2023 07:22 )    Color: x / Appearance: x / SG: x / pH: x  Gluc: 244 mg/dL / Ketone: x  / Bili: x / Urobili: x   Blood: x / Protein: x / Nitrite: x   Leuk Esterase: x / RBC: x / WBC x   Sq Epi: x / Non Sq Epi: x / Bacteria: x              Physical Examination:  PULM: minimal scattered rhonchi   CVS: S1, S2 heard        RADIOLOGY REVIEWED    CT chest: < from: CT Chest No Cont (06.27.23 @ 11:01) >  FINDINGS:    LYMPH NODES: No lymphadenopathy.    HEART/VASCULATURE: The heart is normal in size. Aortic and coronary   artery calcifications. No pericardial effusion. Bilateral partially   calcified pleural plaques compatible with asbestos exposure.    AIRWAYS/LUNGS/PLEURA: The central airways are patent. Bilateral lower   lobe dependent linear opacities. Right upper lobe subsolid nodule   measures 6 mm (3-47). Right upper lobe perifissural ground glass nodule   measures 5 mm (7-144, 3-60). Mild peribronchovascular groundglass in the   lateral segment of the right middle lobe. No pleural effusion or   pneumothorax.    UPPER ABDOMEN: Unremarkable.    BONES/SOFT TISSUES: Mild degenerative changes.    IMPRESSION:    Bilateral lower lobe dependent atelectasis.    Mild right middle lobe groundglass and small right upper lobe ground   glass nodules which maybe inflammatory. Recommend CT chest in 3 months   to see if they persist.    < end of copied text >      TTE: < from: TTE W or WO Ultrasound Enhancing Agent (06.27.23 @ 14:05) >  CONCLUSIONS:      1. Normal left ventricular cavity size. The left ventricular wall thickness is normal. The left ventricular systolic function is severely decreased with an ejection fraction visually estimated at 30 to 35 %. There are regional wall motion abnormalities No evidence of a thrombus in the left ventricle.   2. Multiple segmental abnormalities exist. See findings.   3. There is mild (grade 1) left ventricular diastolic dysfunction, with normal filling pressure.   4. Normal right ventricular cavity size, normal right ventricular wall thickness and normal right ventricular systolic function. The tricuspid annular plane systolic excursion (TAPSE) is 2.1 cm (normal >=1.7 cm).   5. The right atrium is normal.   6. No pericardial effusion seen.   7. No prior echocardiogram is available for comparison.   8. Symmetric mitral valve leaflet tethering.   9. There is normal LV mass and normal geometry.    ________________________________________________________________________________________  FINDINGS:     Left Ventricle:  Normal left ventricular cavity size. The left ventricular wall thickness is normal. The left ventricular systolic function is severely decreased with an ejection fraction visually estimated at 30 to 35%. There are regional wall motion abnormalities consistent with ischemic heart disease. There is mild (grade 1) left ventricular diastolic dysfunction, with normal filling pressure. There is normal LV mass and normal geometry. There is no evidence of a thrombus in the left ventricle.  LV Wall Scoring: The apex is akinetic. The mid and apical anterior septum, mid  inferolateral segment, apical lateral segment, apical anterior segment, and  basal inferior segment are hypokinetic. All remaining scored segments are  normal.          Right Ventricle:  Normal right ventricular cavity size, normal wall thickness and normal right ventricular systolic function. Tricuspid annular plane systolic excursion (TAPSE) is 2.1 cm (normal >=1.7 cm).     Left Atrium:  The left atrium is normal.     Right Atrium:  The right atrium is normal.     Aortic Valve:  The aortic valve is tricuspid with normal structure without stenosis. There is no evidence of aortic regurgitation.     Mitral Valve:  Structurally normal mitral valve with normal leaflet opening and closing, without any evidence of mitral stenosis or significant regurgitation. There is symmetric leaflet tethering. There is trace mitral regurgitation.    Tricuspid Valve:  Structurally normal tricuspid valve with normal leaflet excursion. There is trace tricuspid regurgitation.     Pulmonic Valve:  Structurally normal pulmonic valve with normal leaflet excursion. There is trace pulmonic regurgitation.     Aorta:  The aortic annulus and aortic root appear normal in size. Normal aorta sinus of Valsalva, measuring 3.20 cm (indexed 2.05 cm/m²).     Pericardium:  No pericardial effusion seen.  ____________________________________________________________________  Quantitative Data:  Left Ventricle Measurements: (Indexed to BSA)     IVSd (2D):   0.9 cm  LVPWd (2D):  0.9 cm  LVIDd (2D):  4.9 cm  LVIDs (2D):  4.1 cm  LV Mass:     151 g  96.9 g/m²  Visualized LV EF%: 30 to 35%     MV E Vmax:    0.57 m/s  MV A Vmax:    1.16 m/s  MV E/A:       0.49  e' lateral:   5.98 cm/s  e' medial:    4.13 cm/s  E/e' lateral: 9.55  E/e' medial:  13.83  E/e' Average: 11.30    Aorta Measurements:     Ao Sinus:      3.2 cm  Ao Root:       3.2 cm  Ao Root s, 2D: 3.2 cm       Left Atrium Measurements: (Indexed to BSA)  LA Diam 2D: 3.40 cm    Right Ventricle Measurements:     TAPSE: 2.1 cm       LVOT / RVOT/ Qp/Qs Data: (Indexed to BSA)  Mitral Valve Measurements:     MV E Vmax: 0.6 m/s  MV A Vmax: 1.2 m/s  MV E/A:   0.5       --------------------------------------------------------------------------------  [employer]ec:  LV Analysis:  EF: 25 %      < end of copied text >

## 2023-06-30 NOTE — PROVIDER CONTACT NOTE (OTHER) - ASSESSMENT
Pt complaining of vomiting multiple times today and being unable to eat any food. Pt complains of nausea and dizziness. Pt denies pain. Pt BP is 168/82 HR 75 Pt complaining of vomiting multiple times today and being unable to eat any food. Pt complains of nausea and dizziness. Pt denies pain. Pt BP is 168/82 HR 75 Temp 97.8 SpO2 95

## 2023-06-30 NOTE — PROGRESS NOTE ADULT - SUBJECTIVE AND OBJECTIVE BOX
Interval history:        MEDICATIONS  (STANDING):  albuterol/ipratropium for Nebulization 3 milliLiter(s) Nebulizer every 6 hours  aspirin enteric coated 81 milliGRAM(s) Oral daily  carbamide peroxide Otic Solution 4 Drop(s) Left Ear two times a day  cefTRIAXone   IVPB 1000 milliGRAM(s) IV Intermittent every 24 hours  dextrose 5%. 1000 milliLiter(s) (100 mL/Hr) IV Continuous <Continuous>  dextrose 5%. 1000 milliLiter(s) (50 mL/Hr) IV Continuous <Continuous>  dextrose 50% Injectable 25 Gram(s) IV Push once  dextrose 50% Injectable 12.5 Gram(s) IV Push once  dextrose 50% Injectable 25 Gram(s) IV Push once  glucagon  Injectable 1 milliGRAM(s) IntraMuscular once  guaiFENesin ER 1200 milliGRAM(s) Oral every 12 hours  heparin   Injectable 5000 Unit(s) SubCutaneous every 12 hours  insulin glargine Injectable (LANTUS) 18 Unit(s) SubCutaneous at bedtime  insulin lispro (ADMELOG) corrective regimen sliding scale   SubCutaneous three times a day before meals  insulin lispro (ADMELOG) corrective regimen sliding scale   SubCutaneous at bedtime  insulin lispro Injectable (ADMELOG) 6 Unit(s) SubCutaneous three times a day before meals  metoprolol succinate ER 50 milliGRAM(s) Oral daily  sacubitril 49 mG/valsartan 51 mG 1 Tablet(s) Oral two times a day    MEDICATIONS  (PRN):  dextrose Oral Gel 15 Gram(s) Oral once PRN Blood Glucose LESS THAN 70 milliGRAM(s)/deciliter      Allergies    No Known Allergies    Intolerances      Review of Systems:  Constitutional: No fever  Eyes: No blurry vision  Neuro: No tremors  HEENT: No pain  Cardiovascular: No chest pain, palpitations  Respiratory: No SOB, no cough  GI: No nausea, vomiting, abdominal pain  : No dysuria  Skin: no rash  Psych: no depression  Endocrine: no polyuria, polydipsia  Hem/lymph: no swelling  Osteoporosis: no fractures    ALL OTHER SYSTEMS REVIEWED AND NEGATIVE    UNABLE TO OBTAIN    PHYSICAL EXAM:  VITALS: T(C): 36.6 (06-30-23 @ 05:24)  T(F): 97.9 (06-30-23 @ 05:24), Max: 97.9 (06-29-23 @ 17:10)  HR: 77 (06-30-23 @ 05:24) (58 - 93)  BP: 126/74 (06-30-23 @ 05:24) (125/71 - 180/98)  RR:  (14 - 18)  SpO2:  (92% - 98%)  Wt(kg): --  GENERAL: NAD, well-groomed, well-developed  EYES: No proptosis, no lid lag, anicteric  HEENT:  Atraumatic, Normocephalic, moist mucous membranes  THYROID: Normal size, no palpable nodules  RESPIRATORY: Clear to auscultation bilaterally; No rales, rhonchi, wheezing, or rubs  CARDIOVASCULAR: Regular rate and rhythm; No murmurs; no peripheral edema  GI: Soft, nontender, non distended, normal bowel sounds  SKIN: Dry, intact, No rashes or lesions  MUSCULOSKELETAL: Full range of motion, normal strength  NEURO: sensation intact, extraocular movements intact, no tremor, normal reflexes  PSYCH: Alert and oriented x 3, normal affect, normal mood  CUSHING'S SIGNS: no striae    POCT Blood Glucose.: 231 mg/dL (06-29-23 @ 22:18)  POCT Blood Glucose.: 149 mg/dL (06-29-23 @ 17:06)  POCT Blood Glucose.: 145 mg/dL (06-29-23 @ 15:36)  POCT Blood Glucose.: 204 mg/dL (06-29-23 @ 09:30)  POCT Blood Glucose.: 200 mg/dL (06-28-23 @ 21:57)  POCT Blood Glucose.: 152 mg/dL (06-28-23 @ 17:38)  POCT Blood Glucose.: 252 mg/dL (06-28-23 @ 13:56)  POCT Blood Glucose.: 237 mg/dL (06-28-23 @ 09:06)  POCT Blood Glucose.: 131 mg/dL (06-27-23 @ 21:24)  POCT Blood Glucose.: 256 mg/dL (06-27-23 @ 17:49)  POCT Blood Glucose.: 261 mg/dL (06-27-23 @ 13:09)      06-30    136  |  100  |  20  ----------------------------<  244<H>  4.0   |  21<L>  |  0.68    eGFR: 97    Ca    9.4      06-30  Mg     2.1     06-30  Phos  3.8     06-30            Thyroid Function Tests:  06-29 @ 19:11 TSH 4.73 FreeT4 -- T3 66 Anti TPO -- Anti Thyroglobulin Ab -- TSI --  06-27 @ 07:41 TSH 5.56 FreeT4 -- T3 -- Anti TPO -- Anti Thyroglobulin Ab -- TSI --                         Interval history:  BG noted to be in the 200s this AM.    MEDICATIONS  (STANDING):  albuterol/ipratropium for Nebulization 3 milliLiter(s) Nebulizer every 6 hours  aspirin enteric coated 81 milliGRAM(s) Oral daily  carbamide peroxide Otic Solution 4 Drop(s) Left Ear two times a day  cefTRIAXone   IVPB 1000 milliGRAM(s) IV Intermittent every 24 hours  dextrose 5%. 1000 milliLiter(s) (100 mL/Hr) IV Continuous <Continuous>  dextrose 5%. 1000 milliLiter(s) (50 mL/Hr) IV Continuous <Continuous>  dextrose 50% Injectable 25 Gram(s) IV Push once  dextrose 50% Injectable 12.5 Gram(s) IV Push once  dextrose 50% Injectable 25 Gram(s) IV Push once  glucagon  Injectable 1 milliGRAM(s) IntraMuscular once  guaiFENesin ER 1200 milliGRAM(s) Oral every 12 hours  heparin   Injectable 5000 Unit(s) SubCutaneous every 12 hours  insulin glargine Injectable (LANTUS) 18 Unit(s) SubCutaneous at bedtime  insulin lispro (ADMELOG) corrective regimen sliding scale   SubCutaneous three times a day before meals  insulin lispro (ADMELOG) corrective regimen sliding scale   SubCutaneous at bedtime  insulin lispro Injectable (ADMELOG) 6 Unit(s) SubCutaneous three times a day before meals  metoprolol succinate ER 50 milliGRAM(s) Oral daily  sacubitril 49 mG/valsartan 51 mG 1 Tablet(s) Oral two times a day    MEDICATIONS  (PRN):  dextrose Oral Gel 15 Gram(s) Oral once PRN Blood Glucose LESS THAN 70 milliGRAM(s)/deciliter      Allergies    No Known Allergies    Intolerances      Review of Systems:  Constitutional: No fever  Eyes: No blurry vision  Neuro: No tremors  HEENT: No pain  Cardiovascular: No chest pain, palpitations  Respiratory: No SOB, no cough  GI: No nausea, vomiting, abdominal pain  : No dysuria  Skin: no rash  Psych: no depression  Endocrine: no polyuria, polydipsia  Hem/lymph: no swelling  Osteoporosis: no fractures    ALL OTHER SYSTEMS REVIEWED AND NEGATIVE    UNABLE TO OBTAIN    PHYSICAL EXAM:  VITALS: T(C): 36.6 (06-30-23 @ 05:24)  T(F): 97.9 (06-30-23 @ 05:24), Max: 97.9 (06-29-23 @ 17:10)  HR: 77 (06-30-23 @ 05:24) (58 - 93)  BP: 126/74 (06-30-23 @ 05:24) (125/71 - 180/98)  RR:  (14 - 18)  SpO2:  (92% - 98%)  Wt(kg): --  GENERAL: NAD, well-groomed, well-developed  EYES: No proptosis, no lid lag, anicteric  HEENT:  Atraumatic, Normocephalic, moist mucous membranes  THYROID: Normal size, no palpable nodules  RESPIRATORY: Clear to auscultation bilaterally; No rales, rhonchi, wheezing, or rubs  CARDIOVASCULAR: Regular rate and rhythm; No murmurs; no peripheral edema  GI: Soft, nontender, non distended, normal bowel sounds  SKIN: Dry, intact, No rashes or lesions  MUSCULOSKELETAL: Full range of motion, normal strength  NEURO: sensation intact, extraocular movements intact, no tremor, normal reflexes  PSYCH: Alert and oriented x 3, normal affect, normal mood  CUSHING'S SIGNS: no striae    POCT Blood Glucose.: 231 mg/dL (06-29-23 @ 22:18)  POCT Blood Glucose.: 149 mg/dL (06-29-23 @ 17:06)  POCT Blood Glucose.: 145 mg/dL (06-29-23 @ 15:36)  POCT Blood Glucose.: 204 mg/dL (06-29-23 @ 09:30)  POCT Blood Glucose.: 200 mg/dL (06-28-23 @ 21:57)  POCT Blood Glucose.: 152 mg/dL (06-28-23 @ 17:38)  POCT Blood Glucose.: 252 mg/dL (06-28-23 @ 13:56)  POCT Blood Glucose.: 237 mg/dL (06-28-23 @ 09:06)  POCT Blood Glucose.: 131 mg/dL (06-27-23 @ 21:24)  POCT Blood Glucose.: 256 mg/dL (06-27-23 @ 17:49)  POCT Blood Glucose.: 261 mg/dL (06-27-23 @ 13:09)      06-30    136  |  100  |  20  ----------------------------<  244<H>  4.0   |  21<L>  |  0.68    eGFR: 97    Ca    9.4      06-30  Mg     2.1     06-30  Phos  3.8     06-30            Thyroid Function Tests:  06-29 @ 19:11 TSH 4.73 FreeT4 -- T3 66 Anti TPO -- Anti Thyroglobulin Ab -- TSI --  06-27 @ 07:41 TSH 5.56 FreeT4 -- T3 -- Anti TPO -- Anti Thyroglobulin Ab -- TSI --

## 2023-06-30 NOTE — PROGRESS NOTE ADULT - ASSESSMENT
This is a 72 yo M /w a PMH of DM2, HTN, CAD /w 3 stents presents with pneumonia. Patient also found to have EF of 30% as well as an HbA1c of 10%. Patient is high risk with high level decision making due to uncontrolled diabetes which places patient at high risk for cardiovascular and cerebrovascular events. Patient with lability of glucose requiring close monitoring and insulin adjustments.  Endocrinology consulted for diabetes management.    #DM2 /w HbA1c 10%  - Increase Lantus to 22 units nightly   - Increase Admelog 7 units before meals  -low admelog correction scale before meals and before bedtime  -carb consistent diet  -FSG before meals and before bedtime with goal -180  -RD consult  -hypoglycemia protocol in place    #HTN  -c/w entersto  -BP goal <130/80    #HLD  -patient should be started on a high intensity statin given his CAD and HFrEF    Ana Kahn,    Attending Physician   Department of Endocrinology, Diabetes and Metabolism     If before 9AM or after 5PM, or on weekends/holidays, please call the Endocrine answering service for assistance ([phone]).  For nonurgent matters, please email [email or web address] for assistance.

## 2023-06-30 NOTE — PROGRESS NOTE ADULT - SUBJECTIVE AND OBJECTIVE BOX
Gerardo Sotelo MD  Cardiology Fellow  [phone]  All Cardiology service information can be found 24/7 on [email or web address], password: [email or web address]    Patient seen and examined at bedside.  6/29  s/p diagnostic cath pLAD  w/ R to L collateral, Cx severe diffuse disease, pRCA 90%, RPL  via RRA  CTS Dr Prasad consulted for CABG  n/v overnight  HR 50s-90s BP 130s-180s/90s (high after emesis episodes)    Review Of Systems: No chest pain, shortness of breath, or palpitations            Current Meds:  albuterol/ipratropium for Nebulization 3 milliLiter(s) Nebulizer every 6 hours  aspirin enteric coated 81 milliGRAM(s) Oral daily  carbamide peroxide Otic Solution 4 Drop(s) Left Ear two times a day  cefTRIAXone   IVPB 1000 milliGRAM(s) IV Intermittent every 24 hours  dextrose 5%. 1000 milliLiter(s) IV Continuous <Continuous>  dextrose 5%. 1000 milliLiter(s) IV Continuous <Continuous>  dextrose 50% Injectable 25 Gram(s) IV Push once  dextrose 50% Injectable 12.5 Gram(s) IV Push once  dextrose 50% Injectable 25 Gram(s) IV Push once  dextrose Oral Gel 15 Gram(s) Oral once PRN  glucagon  Injectable 1 milliGRAM(s) IntraMuscular once  guaiFENesin ER 1200 milliGRAM(s) Oral every 12 hours  heparin   Injectable 5000 Unit(s) SubCutaneous every 12 hours  insulin glargine Injectable (LANTUS) 18 Unit(s) SubCutaneous at bedtime  insulin lispro (ADMELOG) corrective regimen sliding scale   SubCutaneous three times a day before meals  insulin lispro (ADMELOG) corrective regimen sliding scale   SubCutaneous at bedtime  insulin lispro Injectable (ADMELOG) 6 Unit(s) SubCutaneous three times a day before meals  metoprolol succinate ER 50 milliGRAM(s) Oral daily  sacubitril 49 mG/valsartan 51 mG 1 Tablet(s) Oral two times a day    Vitals:  T(F): 97.5 (06-30), Max: 97.9 (06-29)  HR: 76 (06-30) (58 - 93)  BP: 136/74 (06-30) (125/71 - 180/98)  RR: 18 (06-30)  SpO2: 93% (06-30)  I&O's Summary    28 Jun 2023 07:01  -  29 Jun 2023 07:00  --------------------------------------------------------  IN: 1110 mL / OUT: 0 mL / NET: 1110 mL    29 Jun 2023 07:01  -  30 Jun 2023 06:22  --------------------------------------------------------  IN: 280 mL / OUT: 250 mL / NET: 30 mL    Physical Exam:  Appearance: No acute distress; well appearing  Eyes: PERRL, EOMI, pink conjunctiva  HEENT: Normal oral mucosa  Cardiovascular: RRR, S1, S2, no murmurs, rubs, or gallops; no edema; no JVD  Respiratory: Clear to auscultation bilaterally  Gastrointestinal: soft, non-tender, non-distended with normal bowel sounds  Musculoskeletal: No clubbing; no joint deformity   Neurologic: Non-focal  Lymphatic: No lymphadenopathy  Psychiatry: AAOx3, mood & affect appropriate  Skin: No rashes, ecchymoses, or cyanosis                          14.6   7.99  )-----------( 336      ( 29 Jun 2023 07:10 )             43.3     06-29    135  |  101  |  22  ----------------------------<  229<H>  3.9   |  21<L>  |  0.85    Ca    9.7      29 Jun 2023 07:18  Phos  3.3     06-29  Mg     2.3     06-29        CARDIAC MARKERS ( 26 Jun 2023 19:47 )  22 ng/L / x     / x     / x     / x     / x      CARDIAC MARKERS ( 26 Jun 2023 12:00 )  21 ng/L / x     / x     / x     / x     / x        ECG: sinus rhythm, LVH by Sokolov Jung criteria TWI asymmetric inferolaterally    TTE  CONCLUSIONS:   1. Normal left ventricular cavity size. The left ventricular wall thickness is normal. The left ventricular systolic function is severely decreased with an ejection fraction visually estimated at 30 to 35 %. There are regional wall motion abnormalities No evidence of a thrombus in the left ventricle.   2. Multiple segmental abnormalities exist. See findings.   3. There is mild (grade 1) left ventricular diastolic dysfunction, with normal filling pressure.   4. Normal right ventricular cavity size, normal right ventricular wall thickness and normal right ventricular systolic function. The tricuspid annular plane systolic excursion (TAPSE) is 2.1 cm (normal >=1.7 cm).   5. The right atrium is normal.   6. No pericardial effusion seen.   7. No prior echocardiogram is available for comparison.   8. Symmetric mitral valve leaflet tethering.   9. There is normal LV mass and normal geometry.    CT  IMPRESSION:  Bilateral lower lobe dependent atelectasis.  Mild right middle lobe groundglass and small right upper lobe ground   glass nodules which maybe inflammatory. Recommend CT chest in 3 months   to see if they persist.    6/29 Cath  pLAD  w/ R to L collateral, Cx severe diffuse disease, pRCA 90%, RPL  via RRA   Gerardo Sotelo MD  Cardiology Fellow  [phone]  All Cardiology service information can be found 24/7 on [email or web address], password: [email or web address]    Patient seen and examined at bedside.  6/29  s/p diagnostic cath pLAD  w/ R to L collateral, Cx severe diffuse disease, pRCA 90%, RPL  via RRA  CTS Dr Prasad consulted for CABG, no role for CTS and will consider complex PCI, to discuss w/ Dr. Hernandez   n/v overnight  HR 50s-90s BP 130s-180s/90s (high after emesis episodes)    Review Of Systems: No chest pain, shortness of breath, or palpitations            Current Meds:  albuterol/ipratropium for Nebulization 3 milliLiter(s) Nebulizer every 6 hours  aspirin enteric coated 81 milliGRAM(s) Oral daily  carbamide peroxide Otic Solution 4 Drop(s) Left Ear two times a day  cefTRIAXone   IVPB 1000 milliGRAM(s) IV Intermittent every 24 hours  dextrose 5%. 1000 milliLiter(s) IV Continuous <Continuous>  dextrose 5%. 1000 milliLiter(s) IV Continuous <Continuous>  dextrose 50% Injectable 25 Gram(s) IV Push once  dextrose 50% Injectable 12.5 Gram(s) IV Push once  dextrose 50% Injectable 25 Gram(s) IV Push once  dextrose Oral Gel 15 Gram(s) Oral once PRN  glucagon  Injectable 1 milliGRAM(s) IntraMuscular once  guaiFENesin ER 1200 milliGRAM(s) Oral every 12 hours  heparin   Injectable 5000 Unit(s) SubCutaneous every 12 hours  insulin glargine Injectable (LANTUS) 18 Unit(s) SubCutaneous at bedtime  insulin lispro (ADMELOG) corrective regimen sliding scale   SubCutaneous three times a day before meals  insulin lispro (ADMELOG) corrective regimen sliding scale   SubCutaneous at bedtime  insulin lispro Injectable (ADMELOG) 6 Unit(s) SubCutaneous three times a day before meals  metoprolol succinate ER 50 milliGRAM(s) Oral daily  sacubitril 49 mG/valsartan 51 mG 1 Tablet(s) Oral two times a day    Vitals:  T(F): 97.5 (06-30), Max: 97.9 (06-29)  HR: 76 (06-30) (58 - 93)  BP: 136/74 (06-30) (125/71 - 180/98)  RR: 18 (06-30)  SpO2: 93% (06-30)  I&O's Summary    28 Jun 2023 07:01  -  29 Jun 2023 07:00  --------------------------------------------------------  IN: 1110 mL / OUT: 0 mL / NET: 1110 mL    29 Jun 2023 07:01  -  30 Jun 2023 06:22  --------------------------------------------------------  IN: 280 mL / OUT: 250 mL / NET: 30 mL    Physical Exam:  Appearance: No acute distress; well appearing  Eyes: PERRL, EOMI, pink conjunctiva  HEENT: Normal oral mucosa  Cardiovascular: RRR, S1, S2, no murmurs, rubs, or gallops; no edema; no JVD  Respiratory: Clear to auscultation bilaterally  Gastrointestinal: soft, non-tender, non-distended with normal bowel sounds  Musculoskeletal: No clubbing; no joint deformity   Neurologic: Non-focal  Lymphatic: No lymphadenopathy  Psychiatry: AAOx3, mood & affect appropriate  Skin: No rashes, ecchymoses, or cyanosis                          14.6   7.99  )-----------( 336      ( 29 Jun 2023 07:10 )             43.3     06-29    135  |  101  |  22  ----------------------------<  229<H>  3.9   |  21<L>  |  0.85    Ca    9.7      29 Jun 2023 07:18  Phos  3.3     06-29  Mg     2.3     06-29        CARDIAC MARKERS ( 26 Jun 2023 19:47 )  22 ng/L / x     / x     / x     / x     / x      CARDIAC MARKERS ( 26 Jun 2023 12:00 )  21 ng/L / x     / x     / x     / x     / x        ECG: sinus rhythm, LVH by Sokolov Jung criteria TWI asymmetric inferolaterally    TTE  CONCLUSIONS:   1. Normal left ventricular cavity size. The left ventricular wall thickness is normal. The left ventricular systolic function is severely decreased with an ejection fraction visually estimated at 30 to 35 %. There are regional wall motion abnormalities No evidence of a thrombus in the left ventricle.   2. Multiple segmental abnormalities exist. See findings.   3. There is mild (grade 1) left ventricular diastolic dysfunction, with normal filling pressure.   4. Normal right ventricular cavity size, normal right ventricular wall thickness and normal right ventricular systolic function. The tricuspid annular plane systolic excursion (TAPSE) is 2.1 cm (normal >=1.7 cm).   5. The right atrium is normal.   6. No pericardial effusion seen.   7. No prior echocardiogram is available for comparison.   8. Symmetric mitral valve leaflet tethering.   9. There is normal LV mass and normal geometry.    CT  IMPRESSION:  Bilateral lower lobe dependent atelectasis.  Mild right middle lobe groundglass and small right upper lobe ground   glass nodules which maybe inflammatory. Recommend CT chest in 3 months   to see if they persist.    6/29 Cath  pLAD  w/ R to L collateral, Cx severe diffuse disease, pRCA 90%, RPL  via RRA

## 2023-07-01 LAB
GLUCOSE BLDC GLUCOMTR-MCNC: 138 MG/DL — HIGH (ref 70–99)
GLUCOSE BLDC GLUCOMTR-MCNC: 149 MG/DL — HIGH (ref 70–99)
GLUCOSE BLDC GLUCOMTR-MCNC: 189 MG/DL — HIGH (ref 70–99)
GLUCOSE BLDC GLUCOMTR-MCNC: 90 MG/DL — SIGNIFICANT CHANGE UP (ref 70–99)

## 2023-07-01 PROCEDURE — 99233 SBSQ HOSP IP/OBS HIGH 50: CPT

## 2023-07-01 RX ORDER — ATORVASTATIN CALCIUM 80 MG/1
80 TABLET, FILM COATED ORAL AT BEDTIME
Refills: 0 | Status: DISCONTINUED | OUTPATIENT
Start: 2023-07-01 | End: 2023-07-17

## 2023-07-01 RX ADMIN — HEPARIN SODIUM 5000 UNIT(S): 5000 INJECTION INTRAVENOUS; SUBCUTANEOUS at 05:38

## 2023-07-01 RX ADMIN — INSULIN GLARGINE 18 UNIT(S): 100 INJECTION, SOLUTION SUBCUTANEOUS at 21:36

## 2023-07-01 RX ADMIN — ATORVASTATIN CALCIUM 80 MILLIGRAM(S): 80 TABLET, FILM COATED ORAL at 21:36

## 2023-07-01 RX ADMIN — Medication 3 MILLILITER(S): at 23:35

## 2023-07-01 RX ADMIN — Medication 6 UNIT(S): at 09:30

## 2023-07-01 RX ADMIN — Medication 6 UNIT(S): at 13:19

## 2023-07-01 RX ADMIN — Medication 3 MILLILITER(S): at 13:19

## 2023-07-01 RX ADMIN — HEPARIN SODIUM 5000 UNIT(S): 5000 INJECTION INTRAVENOUS; SUBCUTANEOUS at 17:36

## 2023-07-01 RX ADMIN — Medication 50 MILLIGRAM(S): at 05:39

## 2023-07-01 RX ADMIN — SACUBITRIL AND VALSARTAN 1 TABLET(S): 24; 26 TABLET, FILM COATED ORAL at 17:36

## 2023-07-01 RX ADMIN — Medication 1200 MILLIGRAM(S): at 17:36

## 2023-07-01 RX ADMIN — CARBAMIDE PEROXIDE 4 DROP(S): 81.86 SOLUTION/ DROPS AURICULAR (OTIC) at 05:39

## 2023-07-01 RX ADMIN — SACUBITRIL AND VALSARTAN 1 TABLET(S): 24; 26 TABLET, FILM COATED ORAL at 05:39

## 2023-07-01 RX ADMIN — Medication 6 UNIT(S): at 18:19

## 2023-07-01 RX ADMIN — Medication 81 MILLIGRAM(S): at 13:20

## 2023-07-01 RX ADMIN — CARBAMIDE PEROXIDE 4 DROP(S): 81.86 SOLUTION/ DROPS AURICULAR (OTIC) at 17:36

## 2023-07-01 RX ADMIN — Medication 3 MILLILITER(S): at 05:38

## 2023-07-01 RX ADMIN — Medication 1200 MILLIGRAM(S): at 05:39

## 2023-07-01 RX ADMIN — Medication 3 MILLILITER(S): at 17:37

## 2023-07-01 NOTE — PROGRESS NOTE ADULT - ASSESSMENT
72 y/o mostly [place]yr[place]z speaking M with PMH of DM, HTN, CAD s/p 3 stents. Presents with cough for the past 2 weeks. CXR with small L basilar opacity. Also noted to be in hypertensive urgency in ED.

## 2023-07-01 NOTE — PROGRESS NOTE ADULT - SUBJECTIVE AND OBJECTIVE BOX
Follow-up Pulm Progress Note     #[id number]    feeling better overall  mostly dry cough, some congestion at times  denies SOB/CP      Medications:  MEDICATIONS  (STANDING):  albuterol/ipratropium for Nebulization 3 milliLiter(s) Nebulizer every 6 hours  aspirin enteric coated 81 milliGRAM(s) Oral daily  carbamide peroxide Otic Solution 4 Drop(s) Left Ear two times a day  dextrose 5%. 1000 milliLiter(s) (100 mL/Hr) IV Continuous <Continuous>  dextrose 5%. 1000 milliLiter(s) (50 mL/Hr) IV Continuous <Continuous>  dextrose 50% Injectable 25 Gram(s) IV Push once  dextrose 50% Injectable 12.5 Gram(s) IV Push once  dextrose 50% Injectable 25 Gram(s) IV Push once  glucagon  Injectable 1 milliGRAM(s) IntraMuscular once  guaiFENesin ER 1200 milliGRAM(s) Oral every 12 hours  heparin   Injectable 5000 Unit(s) SubCutaneous every 12 hours  insulin glargine Injectable (LANTUS) 18 Unit(s) SubCutaneous at bedtime  insulin lispro (ADMELOG) corrective regimen sliding scale   SubCutaneous three times a day before meals  insulin lispro (ADMELOG) corrective regimen sliding scale   SubCutaneous at bedtime  insulin lispro Injectable (ADMELOG) 6 Unit(s) SubCutaneous three times a day before meals  metoprolol succinate ER 50 milliGRAM(s) Oral daily  sacubitril 49 mG/valsartan 51 mG 1 Tablet(s) Oral two times a day    MEDICATIONS  (PRN):  dextrose Oral Gel 15 Gram(s) Oral once PRN Blood Glucose LESS THAN 70 milliGRAM(s)/deciliter          Vital Signs Last 24 Hrs  T(C): 36.6 (01 Jul 2023 04:50), Max: 37 (30 Jun 2023 18:15)  T(F): 97.9 (01 Jul 2023 04:50), Max: 98.6 (30 Jun 2023 18:15)  HR: 64 (01 Jul 2023 04:50) (64 - 82)  BP: 110/62 (01 Jul 2023 04:50) (110/62 - 149/79)  BP(mean): --  RR: 18 (01 Jul 2023 04:50) (18 - 18)  SpO2: 96% (01 Jul 2023 04:50) (95% - 99%)    Parameters below as of 01 Jul 2023 04:50  Patient On (Oxygen Delivery Method): room air              06-30 @ 07:01  -  07-01 @ 07:00  --------------------------------------------------------  IN: 970 mL / OUT: 500 mL / NET: 470 mL          LABS:    06-30    136  |  100  |  20  ----------------------------<  244<H>  4.0   |  21<L>  |  0.68    Ca    9.4      30 Jun 2023 07:22  Phos  3.8     06-30  Mg     2.1     06-30            CAPILLARY BLOOD GLUCOSE      POCT Blood Glucose.: 210 mg/dL (30 Jun 2023 22:12)      Urinalysis Basic - ( 30 Jun 2023 07:22 )    Color: x / Appearance: x / SG: x / pH: x  Gluc: 244 mg/dL / Ketone: x  / Bili: x / Urobili: x   Blood: x / Protein: x / Nitrite: x   Leuk Esterase: x / RBC: x / WBC x   Sq Epi: x / Non Sq Epi: x / Bacteria: x        Physical Examination:  PULM: minimal scattered rhonchi   CVS: S1, S2 heard        RADIOLOGY REVIEWED    CT chest: < from: CT Chest No Cont (06.27.23 @ 11:01) >  FINDINGS:    LYMPH NODES: No lymphadenopathy.    HEART/VASCULATURE: The heart is normal in size. Aortic and coronary   artery calcifications. No pericardial effusion. Bilateral partially   calcified pleural plaques compatible with asbestos exposure.    AIRWAYS/LUNGS/PLEURA: The central airways are patent. Bilateral lower   lobe dependent linear opacities. Right upper lobe subsolid nodule   measures 6 mm (3-47). Right upper lobe perifissural ground glass nodule   measures 5 mm (7-144, 3-60). Mild peribronchovascular groundglass in the   lateral segment of the right middle lobe. No pleural effusion or   pneumothorax.    UPPER ABDOMEN: Unremarkable.    BONES/SOFT TISSUES: Mild degenerative changes.    IMPRESSION:    Bilateral lower lobe dependent atelectasis.    Mild right middle lobe groundglass and small right upper lobe ground   glass nodules which maybe inflammatory. Recommend CT chest in 3 months   to see if they persist.    < end of copied text >      TTE: < from: TTE W or WO Ultrasound Enhancing Agent (06.27.23 @ 14:05) >  CONCLUSIONS:      1. Normal left ventricular cavity size. The left ventricular wall thickness is normal. The left ventricular systolic function is severely decreased with an ejection fraction visually estimated at 30 to 35 %. There are regional wall motion abnormalities No evidence of a thrombus in the left ventricle.   2. Multiple segmental abnormalities exist. See findings.   3. There is mild (grade 1) left ventricular diastolic dysfunction, with normal filling pressure.   4. Normal right ventricular cavity size, normal right ventricular wall thickness and normal right ventricular systolic function. The tricuspid annular plane systolic excursion (TAPSE) is 2.1 cm (normal >=1.7 cm).   5. The right atrium is normal.   6. No pericardial effusion seen.   7. No prior echocardiogram is available for comparison.   8. Symmetric mitral valve leaflet tethering.   9. There is normal LV mass and normal geometry.    ________________________________________________________________________________________  FINDINGS:     Left Ventricle:  Normal left ventricular cavity size. The left ventricular wall thickness is normal. The left ventricular systolic function is severely decreased with an ejection fraction visually estimated at 30 to 35%. There are regional wall motion abnormalities consistent with ischemic heart disease. There is mild (grade 1) left ventricular diastolic dysfunction, with normal filling pressure. There is normal LV mass and normal geometry. There is no evidence of a thrombus in the left ventricle.  LV Wall Scoring: The apex is akinetic. The mid and apical anterior septum, mid  inferolateral segment, apical lateral segment, apical anterior segment, and  basal inferior segment are hypokinetic. All remaining scored segments are  normal.          Right Ventricle:  Normal right ventricular cavity size, normal wall thickness and normal right ventricular systolic function. Tricuspid annular plane systolic excursion (TAPSE) is 2.1 cm (normal >=1.7 cm).     Left Atrium:  The left atrium is normal.     Right Atrium:  The right atrium is normal.     Aortic Valve:  The aortic valve is tricuspid with normal structure without stenosis. There is no evidence of aortic regurgitation.     Mitral Valve:  Structurally normal mitral valve with normal leaflet opening and closing, without any evidence of mitral stenosis or significant regurgitation. There is symmetric leaflet tethering. There is trace mitral regurgitation.    Tricuspid Valve:  Structurally normal tricuspid valve with normal leaflet excursion. There is trace tricuspid regurgitation.     Pulmonic Valve:  Structurally normal pulmonic valve with normal leaflet excursion. There is trace pulmonic regurgitation.     Aorta:  The aortic annulus and aortic root appear normal in size. Normal aorta sinus of Valsalva, measuring 3.20 cm (indexed 2.05 cm/m²).     Pericardium:  No pericardial effusion seen.  ____________________________________________________________________  Quantitative Data:  Left Ventricle Measurements: (Indexed to BSA)     IVSd (2D):   0.9 cm  LVPWd (2D):  0.9 cm  LVIDd (2D):  4.9 cm  LVIDs (2D):  4.1 cm  LV Mass:     151 g  96.9 g/m²  Visualized LV EF%: 30 to 35%     MV E Vmax:    0.57 m/s  MV A Vmax:    1.16 m/s  MV E/A:       0.49  e' lateral:   5.98 cm/s  e' medial:    4.13 cm/s  E/e' lateral: 9.55  E/e' medial:  13.83  E/e' Average: 11.30    Aorta Measurements:     Ao Sinus:      3.2 cm  Ao Root:       3.2 cm  Ao Root s, 2D: 3.2 cm       Left Atrium Measurements: (Indexed to BSA)  LA Diam 2D: 3.40 cm    Right Ventricle Measurements:     TAPSE: 2.1 cm       LVOT / RVOT/ Qp/Qs Data: (Indexed to BSA)  Mitral Valve Measurements:     MV E Vmax: 0.6 m/s  MV A Vmax: 1.2 m/s  MV E/A:   0.5       --------------------------------------------------------------------------------  [employer]:  LV Analysis:  EF: 25 %      < end of copied text >

## 2023-07-01 NOTE — PROGRESS NOTE ADULT - SUBJECTIVE AND OBJECTIVE BOX
Patient seen and examined at bedside.  6/29  s/p diagnostic cath pLAD  w/ R to L collateral, Cx severe diffuse disease, pRCA 90%, RPL  via RRA  CTS Dr Prasad consulted for CABG, no role for CTS and will consider complex PCI, to discuss w/ Dr. Hernandez   HR 50s-90s BP 130s-180s/90s (high after emesis episodes)  no cp palps    Review Of Systems: No chest pain, shortness of breath, or palpitations            Current Meds:  albuterol/ipratropium for Nebulization 3 milliLiter(s) Nebulizer every 6 hours  aspirin enteric coated 81 milliGRAM(s) Oral daily  carbamide peroxide Otic Solution 4 Drop(s) Left Ear two times a day  cefTRIAXone   IVPB 1000 milliGRAM(s) IV Intermittent every 24 hours  dextrose 5%. 1000 milliLiter(s) IV Continuous <Continuous>  dextrose 5%. 1000 milliLiter(s) IV Continuous <Continuous>  dextrose 50% Injectable 25 Gram(s) IV Push once  dextrose 50% Injectable 12.5 Gram(s) IV Push once  dextrose 50% Injectable 25 Gram(s) IV Push once  dextrose Oral Gel 15 Gram(s) Oral once PRN  glucagon  Injectable 1 milliGRAM(s) IntraMuscular once  guaiFENesin ER 1200 milliGRAM(s) Oral every 12 hours  heparin   Injectable 5000 Unit(s) SubCutaneous every 12 hours  insulin glargine Injectable (LANTUS) 18 Unit(s) SubCutaneous at bedtime  insulin lispro (ADMELOG) corrective regimen sliding scale   SubCutaneous three times a day before meals  insulin lispro (ADMELOG) corrective regimen sliding scale   SubCutaneous at bedtime  insulin lispro Injectable (ADMELOG) 6 Unit(s) SubCutaneous three times a day before meals  metoprolol succinate ER 50 milliGRAM(s) Oral daily  sacubitril 49 mG/valsartan 51 mG 1 Tablet(s) Oral two times a day    Vitals:  T(F): 97.5 (06-30), Max: 97.9 (06-29)  HR: 76 (06-30) (58 - 93)  BP: 136/74 (06-30) (125/71 - 180/98)  RR: 18 (06-30)  SpO2: 93% (06-30)  I&O's Summary    28 Jun 2023 07:01 - 29 Jun 2023 07:00  --------------------------------------------------------  IN: 1110 mL / OUT: 0 mL / NET: 1110 mL    29 Jun 2023 07:01  -  30 Jun 2023 06:22  --------------------------------------------------------  IN: 280 mL / OUT: 250 mL / NET: 30 mL    Physical Exam:  Appearance: No acute distress; well appearing  Eyes: PERRL, EOMI, pink conjunctiva  HEENT: Normal oral mucosa  Cardiovascular: RRR, S1, S2, no murmurs, rubs, or gallops; no edema; no JVD  Respiratory: Clear to auscultation bilaterally  Gastrointestinal: soft, non-tender, non-distended with normal bowel sounds  Musculoskeletal: No clubbing; no joint deformity   Neurologic: Non-focal  Lymphatic: No lymphadenopathy  Psychiatry: AAOx3, mood & affect appropriate  Skin: No rashes, ecchymoses, or cyanosis                          14.6   7.99  )-----------( 336      ( 29 Jun 2023 07:10 )             43.3     06-29    135  |  101  |  22  ----------------------------<  229<H>  3.9   |  21<L>  |  0.85    Ca    9.7      29 Jun 2023 07:18  Phos  3.3     06-29  Mg     2.3     06-29        CARDIAC MARKERS ( 26 Jun 2023 19:47 )  22 ng/L / x     / x     / x     / x     / x      CARDIAC MARKERS ( 26 Jun 2023 12:00 )  21 ng/L / x     / x     / x     / x     / x        ECG: sinus rhythm, LVH by Sokolov Jung criteria TWI asymmetric inferolaterally    TTE  CONCLUSIONS:   1. Normal left ventricular cavity size. The left ventricular wall thickness is normal. The left ventricular systolic function is severely decreased with an ejection fraction visually estimated at 30 to 35 %. There are regional wall motion abnormalities No evidence of a thrombus in the left ventricle.   2. Multiple segmental abnormalities exist. See findings.   3. There is mild (grade 1) left ventricular diastolic dysfunction, with normal filling pressure.   4. Normal right ventricular cavity size, normal right ventricular wall thickness and normal right ventricular systolic function. The tricuspid annular plane systolic excursion (TAPSE) is 2.1 cm (normal >=1.7 cm).   5. The right atrium is normal.   6. No pericardial effusion seen.   7. No prior echocardiogram is available for comparison.   8. Symmetric mitral valve leaflet tethering.   9. There is normal LV mass and normal geometry.    CT  IMPRESSION:  Bilateral lower lobe dependent atelectasis.  Mild right middle lobe groundglass and small right upper lobe ground   glass nodules which maybe inflammatory. Recommend CT chest in 3 months   to see if they persist.    6/29 Cath  pLAD  w/ R to L collateral, Cx severe diffuse disease, pRCA 90%, RPL  via RRA        Assessment and Plan:   · Assessment	  73M with CAD s/p 3 stents, HTN, T2DM, admitted for pneumonia, cardiology consulted for ST depressions and LVH noted on EKG.  This is not ACS.  However, with echo showing down ef to 30's and segmental WMA. Stage B. Will pursue ischemic work up w/ diagnostic LHC     #ST Depressions  #LVH  #Hypertensive urgency  #HFrEF w/ Focal WMA    - TTE w/ EF 30-35%. Stage B, Killips 1. Continue entresto 49/51mg bid, metop succinate 50mg daily. Will consider further uptitration of entresto/addition of MCRA in coming days  - [facility] with triple vessel disease. Unlikely CTS candidate, will discuss w/ Dr. Hernandez for complex PCI, possible cMRI for viability  - Aspirin, start atrova 80   - A1c 10,  HDL 43 Cholesterol 183 tgl 136. Thyroid function c/w euthyroid sick syndrome   - Management of probable bronchitis/PNA as per primary team (on CTX). CT changes (RUL GG nodules) to be repeated in 3 months    B Desi                                                                                                                                                                                                                                Forty-1 minutes spent in patient care management

## 2023-07-01 NOTE — PROGRESS NOTE ADULT - SUBJECTIVE AND OBJECTIVE BOX
Patient is a 73y old  Male who presents with a chief complaint of SOB (29 Jun 2023 17:54)      SUBJECTIVE / OVERNIGHT EVENTS: ptn c/o coughing    MEDICATIONS  (STANDING):  albuterol/ipratropium for Nebulization 3 milliLiter(s) Nebulizer every 6 hours  aspirin enteric coated 81 milliGRAM(s) Oral daily  atorvastatin 80 milliGRAM(s) Oral at bedtime  dextrose 5%. 1000 milliLiter(s) (100 mL/Hr) IV Continuous <Continuous>  dextrose 5%. 1000 milliLiter(s) (50 mL/Hr) IV Continuous <Continuous>  dextrose 50% Injectable 25 Gram(s) IV Push once  dextrose 50% Injectable 12.5 Gram(s) IV Push once  dextrose 50% Injectable 25 Gram(s) IV Push once  glucagon  Injectable 1 milliGRAM(s) IntraMuscular once  guaiFENesin ER 1200 milliGRAM(s) Oral every 12 hours  heparin   Injectable 5000 Unit(s) SubCutaneous every 12 hours  insulin glargine Injectable (LANTUS) 18 Unit(s) SubCutaneous at bedtime  insulin lispro (ADMELOG) corrective regimen sliding scale   SubCutaneous three times a day before meals  insulin lispro (ADMELOG) corrective regimen sliding scale   SubCutaneous at bedtime  insulin lispro Injectable (ADMELOG) 6 Unit(s) SubCutaneous three times a day before meals  metoprolol succinate ER 50 milliGRAM(s) Oral daily  sacubitril 49 mG/valsartan 51 mG 1 Tablet(s) Oral two times a day    MEDICATIONS  (PRN):  dextrose Oral Gel 15 Gram(s) Oral once PRN Blood Glucose LESS THAN 70 milliGRAM(s)/deciliter      Vital Signs Last 24 Hrs  T(F): 97.8 (07-01-23 @ 17:05), Max: 98.6 (06-30-23 @ 18:15)  HR: 69 (07-01-23 @ 17:05) (64 - 70)  BP: 146/71 (07-01-23 @ 17:05) (110/62 - 149/79)  RR: 18 (07-01-23 @ 17:05) (16 - 18)  SpO2: 95% (07-01-23 @ 17:05) (95% - 99%)  Telemetry:   CAPILLARY BLOOD GLUCOSE      POCT Blood Glucose.: 90 mg/dL (01 Jul 2023 17:21)  POCT Blood Glucose.: 149 mg/dL (01 Jul 2023 12:42)  POCT Blood Glucose.: 138 mg/dL (01 Jul 2023 09:24)  POCT Blood Glucose.: 210 mg/dL (30 Jun 2023 22:12)    I&O's Summary    30 Jun 2023 07:01  -  01 Jul 2023 07:00  --------------------------------------------------------  IN: 970 mL / OUT: 500 mL / NET: 470 mL    01 Jul 2023 07:01  -  01 Jul 2023 18:05  --------------------------------------------------------  IN: 720 mL / OUT: 0 mL / NET: 720 mL        PHYSICAL EXAM:  GENERAL: NAD, well-developed  HEAD:  Atraumatic, Normocephalic  EYES: EOMI, PERRLA, conjunctiva and sclera clear  NECK: Supple, No JVD  CHEST/LUNG: Clear to auscultation bilaterally; No wheeze  HEART: Regular rate and rhythm; No murmurs, rubs, or gallops  ABDOMEN: Soft, Nontender, Nondistended; Bowel sounds present  EXTREMITIES:  2+ Peripheral Pulses, No clubbing, cyanosis, or edema  PSYCH: AAOx3  NEUROLOGY: non-focal  SKIN: No rashes or lesions    LABS:    06-30    136  |  100  |  20  ----------------------------<  244<H>  4.0   |  21<L>  |  0.68    Ca    9.4      30 Jun 2023 07:22  Phos  3.8     06-30  Mg     2.1     06-30            Urinalysis Basic - ( 30 Jun 2023 07:22 )    Color: x / Appearance: x / SG: x / pH: x  Gluc: 244 mg/dL / Ketone: x  / Bili: x / Urobili: x   Blood: x / Protein: x / Nitrite: x   Leuk Esterase: x / RBC: x / WBC x   Sq Epi: x / Non Sq Epi: x / Bacteria: x        RADIOLOGY & ADDITIONAL TESTS:    Imaging Personally Reviewed:    Consultant(s) Notes Reviewed:      Care Discussed with Consultants/Other Providers:

## 2023-07-01 NOTE — PROGRESS NOTE ADULT - ASSESSMENT
73M with CAD s/p 3 stents, HTN, T2DM, admitted for pneumonia, cardiology consulted for ST depressions and LVH noted on EKG.      ST Depressions  Hypertensive urgency resolved    -F/u  TTE noted  cath noted  triple vessel   cts sx eval reviewed recommends complex PCI  carotids   c/w meds    likely pna  bronchitis  abs as per pulm  nebs  pulm eval  noted  cards f/u noted  dm  fsg riss  endo f/u  dvt proph

## 2023-07-02 LAB
ANION GAP SERPL CALC-SCNC: 13 MMOL/L — SIGNIFICANT CHANGE UP (ref 5–17)
BUN SERPL-MCNC: 19 MG/DL — SIGNIFICANT CHANGE UP (ref 7–23)
CALCIUM SERPL-MCNC: 9.2 MG/DL — SIGNIFICANT CHANGE UP (ref 8.4–10.5)
CHLORIDE SERPL-SCNC: 101 MMOL/L — SIGNIFICANT CHANGE UP (ref 96–108)
CO2 SERPL-SCNC: 25 MMOL/L — SIGNIFICANT CHANGE UP (ref 22–31)
CREAT SERPL-MCNC: 0.85 MG/DL — SIGNIFICANT CHANGE UP (ref 0.5–1.3)
EGFR: 91 ML/MIN/1.73M2 — SIGNIFICANT CHANGE UP
GLUCOSE BLDC GLUCOMTR-MCNC: 125 MG/DL — HIGH (ref 70–99)
GLUCOSE BLDC GLUCOMTR-MCNC: 125 MG/DL — HIGH (ref 70–99)
GLUCOSE BLDC GLUCOMTR-MCNC: 138 MG/DL — HIGH (ref 70–99)
GLUCOSE BLDC GLUCOMTR-MCNC: 178 MG/DL — HIGH (ref 70–99)
GLUCOSE SERPL-MCNC: 105 MG/DL — HIGH (ref 70–99)
HCT VFR BLD CALC: 39.8 % — SIGNIFICANT CHANGE UP (ref 39–50)
HGB BLD-MCNC: 13.4 G/DL — SIGNIFICANT CHANGE UP (ref 13–17)
MAGNESIUM SERPL-MCNC: 2.2 MG/DL — SIGNIFICANT CHANGE UP (ref 1.6–2.6)
MCHC RBC-ENTMCNC: 30.3 PG — SIGNIFICANT CHANGE UP (ref 27–34)
MCHC RBC-ENTMCNC: 33.7 GM/DL — SIGNIFICANT CHANGE UP (ref 32–36)
MCV RBC AUTO: 90 FL — SIGNIFICANT CHANGE UP (ref 80–100)
NRBC # BLD: 0 /100 WBCS — SIGNIFICANT CHANGE UP (ref 0–0)
PHOSPHATE SERPL-MCNC: 3.2 MG/DL — SIGNIFICANT CHANGE UP (ref 2.5–4.5)
PLATELET # BLD AUTO: 364 K/UL — SIGNIFICANT CHANGE UP (ref 150–400)
POTASSIUM SERPL-MCNC: 3.6 MMOL/L — SIGNIFICANT CHANGE UP (ref 3.5–5.3)
POTASSIUM SERPL-SCNC: 3.6 MMOL/L — SIGNIFICANT CHANGE UP (ref 3.5–5.3)
RBC # BLD: 4.42 M/UL — SIGNIFICANT CHANGE UP (ref 4.2–5.8)
RBC # FLD: 12.9 % — SIGNIFICANT CHANGE UP (ref 10.3–14.5)
SODIUM SERPL-SCNC: 139 MMOL/L — SIGNIFICANT CHANGE UP (ref 135–145)
WBC # BLD: 6.64 K/UL — SIGNIFICANT CHANGE UP (ref 3.8–10.5)
WBC # FLD AUTO: 6.64 K/UL — SIGNIFICANT CHANGE UP (ref 3.8–10.5)

## 2023-07-02 PROCEDURE — 99233 SBSQ HOSP IP/OBS HIGH 50: CPT

## 2023-07-02 RX ADMIN — Medication 6 UNIT(S): at 09:18

## 2023-07-02 RX ADMIN — ATORVASTATIN CALCIUM 80 MILLIGRAM(S): 80 TABLET, FILM COATED ORAL at 21:39

## 2023-07-02 RX ADMIN — SACUBITRIL AND VALSARTAN 1 TABLET(S): 24; 26 TABLET, FILM COATED ORAL at 18:42

## 2023-07-02 RX ADMIN — HEPARIN SODIUM 5000 UNIT(S): 5000 INJECTION INTRAVENOUS; SUBCUTANEOUS at 06:34

## 2023-07-02 RX ADMIN — Medication 81 MILLIGRAM(S): at 11:29

## 2023-07-02 RX ADMIN — Medication 3 MILLILITER(S): at 06:34

## 2023-07-02 RX ADMIN — SACUBITRIL AND VALSARTAN 1 TABLET(S): 24; 26 TABLET, FILM COATED ORAL at 06:34

## 2023-07-02 RX ADMIN — Medication 1: at 18:41

## 2023-07-02 RX ADMIN — Medication 6 UNIT(S): at 18:42

## 2023-07-02 RX ADMIN — Medication 50 MILLIGRAM(S): at 06:34

## 2023-07-02 RX ADMIN — Medication 1200 MILLIGRAM(S): at 06:34

## 2023-07-02 RX ADMIN — Medication 6 UNIT(S): at 13:24

## 2023-07-02 RX ADMIN — INSULIN GLARGINE 18 UNIT(S): 100 INJECTION, SOLUTION SUBCUTANEOUS at 21:39

## 2023-07-02 RX ADMIN — Medication 100 MILLIGRAM(S): at 21:43

## 2023-07-02 RX ADMIN — Medication 3 MILLILITER(S): at 11:29

## 2023-07-02 RX ADMIN — Medication 200 MILLIGRAM(S): at 11:28

## 2023-07-02 RX ADMIN — Medication 3 MILLILITER(S): at 18:42

## 2023-07-02 RX ADMIN — HEPARIN SODIUM 5000 UNIT(S): 5000 INJECTION INTRAVENOUS; SUBCUTANEOUS at 18:44

## 2023-07-02 RX ADMIN — Medication 200 MILLIGRAM(S): at 18:47

## 2023-07-02 NOTE — PROGRESS NOTE ADULT - SUBJECTIVE AND OBJECTIVE BOX
Cardiology Progress Note  ------------------------------------------------------------------------------------------  SUBJECTIVE:   - Tele: sinus 60's-80's, PVC's, couplets  - No events overnight. Denies CP, SOB or Palpitations.   -------------------------------------------------------------------------------------------  ROS:  CV: chest pain (-), palpitation (-), orthopnea (-), PND (-), edema (-)  PULM: SOB (-), cough (-), wheezing (-), hemoptysis (-).   CONST: fever (-), chills (-) or fatigue (-)  GI: abdominal distension (-), abdominal pain (-) , nausea/vomiting (-), hematemesis, (-), melena (-), hematochezia (-)  : dysuria (-), frequency (-), hematuria (-).   NEURO: numbness (-), weakness (-), dizziness (-)  MSK: myalgia (-), joint pain (-)   SKIN: itching (-), rash (-)  HEENT:  visual changes (-); vertigo or throat pain (-);  neck stiffness (-)   Psych: change in mood (-), anxiety (-), depression (-)     All other review of systems is negative unless indicated above.   -------------------------------------------------------------------------------------------  VS:  T(F): 98.1 (07-02), Max: 98.3 (07-01)  HR: 67 (07-02) (67 - 71)  BP: 126/74 (07-02) (126/74 - 146/71)  RR: 18 (07-02)  SpO2: 96% (07-02)  I&O's Summary    01 Jul 2023 07:01  -  02 Jul 2023 07:00  --------------------------------------------------------  IN: 1210 mL / OUT: 550 mL / NET: 660 mL      PHYSICAL EXAM:  GENERAL: NAD  HEAD:  Atraumatic, Normocephalic.  EYES: EOMI, PERRLA, conjunctiva and sclera clear.  ENT: Moist mucous membranes.  NECK: Supple, No JVD.  CHEST/LUNG: Clear to auscultation bilaterally; No rales, rhonchi, wheezing, or rubs. Unlabored respirations.  HEART: Regular rate and rhythm; No murmurs, rubs, or gallops.  ABDOMEN: Bowel sounds present; Soft, Nontender, Nondistended.   EXTREMITIES: No edema. 2+ Peripheral Pulses, brisk capillary refill. No clubbing or cyanosis.   PSYCH: Normal affect.  SKIN: No rashes or lesions.  -------------------------------------------------------------------------------------------  LABS:                          13.4   6.64  )-----------( 364      ( 02 Jul 2023 06:54 )             39.8     07-02    139  |  101  |  19  ----------------------------<  105<H>  3.6   |  25  |  0.85    Ca    9.2      02 Jul 2023 06:54  Phos  3.2     07-02  Mg     2.2     07-02        CARDIAC MARKERS ( 26 Jun 2023 19:47 )  22 ng/L / x     / x     / x     / x     / x      CARDIAC MARKERS ( 26 Jun 2023 12:00 )  21 ng/L / x     / x     / x     / x     / x                -------------------------------------------------------------------------------------------  Meds:  albuterol/ipratropium for Nebulization 3 milliLiter(s) Nebulizer every 6 hours  aspirin enteric coated 81 milliGRAM(s) Oral daily  atorvastatin 80 milliGRAM(s) Oral at bedtime  dextrose 5%. 1000 milliLiter(s) IV Continuous <Continuous>  dextrose 5%. 1000 milliLiter(s) IV Continuous <Continuous>  dextrose 50% Injectable 12.5 Gram(s) IV Push once  dextrose 50% Injectable 25 Gram(s) IV Push once  dextrose 50% Injectable 25 Gram(s) IV Push once  dextrose Oral Gel 15 Gram(s) Oral once PRN  glucagon  Injectable 1 milliGRAM(s) IntraMuscular once  heparin   Injectable 5000 Unit(s) SubCutaneous every 12 hours  insulin glargine Injectable (LANTUS) 18 Unit(s) SubCutaneous at bedtime  insulin lispro (ADMELOG) corrective regimen sliding scale   SubCutaneous at bedtime  insulin lispro (ADMELOG) corrective regimen sliding scale   SubCutaneous three times a day before meals  insulin lispro Injectable (ADMELOG) 6 Unit(s) SubCutaneous three times a day before meals  metoprolol succinate ER 50 milliGRAM(s) Oral daily  sacubitril 49 mG/valsartan 51 mG 1 Tablet(s) Oral two times a day    -------------------------------------------------------------------------------------------  TTE 6/27/23  CONCLUSIONS:   1. Normal left ventricular cavity size. The left ventricular wall thickness is normal. The left ventricular systolic function is severely decreased with an ejection fraction visually estimated at 30 to 35 %. There are regional wall motion abnormalities No evidence of a thrombus in the left ventricle.   2. Multiple segmental abnormalities exist. See findings.   3. There is mild (grade 1) left ventricular diastolic dysfunction, with normal filling pressure.   4. Normal right ventricular cavity size, normal right ventricular wall thickness and normal right ventricular systolic function. The tricuspid annular plane systolic excursion (TAPSE) is 2.1 cm (normal >=1.7 cm).   5. The right atrium is normal.   6. No pericardial effusion seen.   7. No prior echocardiogram is available for comparison.   8. Symmetric mitral valve leaflet tethering.   9. There is normal LV mass and normal geometry.    CT  IMPRESSION:  Bilateral lower lobe dependent atelectasis.  Mild right middle lobe groundglass and small right upper lobe ground   glass nodules which maybe inflammatory. Recommend CT chest in 3 months   to see if they persist.    6/29 Cath  pLAD  w/ R to L collateral, Cx severe diffuse disease, pRCA 90%, RPL  via RRA      -------------------------------------------------------------------------------------------

## 2023-07-02 NOTE — PROGRESS NOTE ADULT - ASSESSMENT
73M with CAD s/p 3 stents, HTN, T2DM, admitted for pneumonia, cardiology consulted for ST depressions and LVH noted on EKG. ACS ruled out however echo showing down ef to 30's and segmental WMA. Received LHC as above showing TVD. Ongoing planning for complex PCI.    #ST Depressions  #LVH  #Hypertensive urgency  #HFrEF w/ Focal WMA    Recs:  - TTE w/ EF 30-35%. Stage B, Killips 1  - euvolemic on exam  - Continue entresto 49/51mg bid  - Continue metop succinate 50mg daily  - Will consider further uptitration of entresto/addition of MRA in coming days  - [facility] with triple vessel disease. CTS consulted w/ no role for CTS. Now planning for complex PCI, will discuss w/ Dr. Hernandez, possible cMRI for viability  - c/w Aspirin, atorva 80   - A1c 10,  HDL 43 Cholesterol 183 tgl 136. Thyroid function c/w euthyroid sick syndrome   - Management of probable bronchitis/PNA as per primary team (on CTX). CT changes (RUL GG nodules) to be repeated in 3 months    Fuentes Coleman MD  Cardiology Fellow - PGY5    Please check [email or web address]: [email or web address]" for cardiology service schedule and contact information.

## 2023-07-02 NOTE — PROGRESS NOTE ADULT - SUBJECTIVE AND OBJECTIVE BOX
Patient is a 73y old  Male who presents with a chief complaint of SOB (29 Jun 2023 17:54)      SUBJECTIVE / OVERNIGHT EVENTS: no new events    MEDICATIONS  (STANDING):  albuterol/ipratropium for Nebulization 3 milliLiter(s) Nebulizer every 6 hours  aspirin enteric coated 81 milliGRAM(s) Oral daily  atorvastatin 80 milliGRAM(s) Oral at bedtime  dextrose 5%. 1000 milliLiter(s) (100 mL/Hr) IV Continuous <Continuous>  dextrose 5%. 1000 milliLiter(s) (50 mL/Hr) IV Continuous <Continuous>  dextrose 50% Injectable 25 Gram(s) IV Push once  dextrose 50% Injectable 25 Gram(s) IV Push once  dextrose 50% Injectable 12.5 Gram(s) IV Push once  glucagon  Injectable 1 milliGRAM(s) IntraMuscular once  heparin   Injectable 5000 Unit(s) SubCutaneous every 12 hours  insulin glargine Injectable (LANTUS) 18 Unit(s) SubCutaneous at bedtime  insulin lispro (ADMELOG) corrective regimen sliding scale   SubCutaneous at bedtime  insulin lispro (ADMELOG) corrective regimen sliding scale   SubCutaneous three times a day before meals  insulin lispro Injectable (ADMELOG) 6 Unit(s) SubCutaneous three times a day before meals  metoprolol succinate ER 50 milliGRAM(s) Oral daily  sacubitril 49 mG/valsartan 51 mG 1 Tablet(s) Oral two times a day    MEDICATIONS  (PRN):  benzonatate 100 milliGRAM(s) Oral three times a day PRN Cough  dextrose Oral Gel 15 Gram(s) Oral once PRN Blood Glucose LESS THAN 70 milliGRAM(s)/deciliter  guaiFENesin Oral Liquid (Sugar-Free) 200 milliGRAM(s) Oral every 6 hours PRN Cough      Vital Signs Last 24 Hrs  T(F): 97.8 (07-02-23 @ 21:30), Max: 98.1 (07-02-23 @ 01:00)  HR: 73 (07-02-23 @ 21:30) (65 - 73)  BP: 136/75 (07-02-23 @ 21:30) (126/74 - 148/86)  RR: 18 (07-02-23 @ 21:30) (18 - 18)  SpO2: 95% (07-02-23 @ 21:30) (94% - 97%)  Telemetry:   CAPILLARY BLOOD GLUCOSE      POCT Blood Glucose.: 125 mg/dL (02 Jul 2023 21:36)  POCT Blood Glucose.: 178 mg/dL (02 Jul 2023 18:10)  POCT Blood Glucose.: 138 mg/dL (02 Jul 2023 13:00)  POCT Blood Glucose.: 125 mg/dL (02 Jul 2023 08:51)    I&O's Summary    01 Jul 2023 07:01  -  02 Jul 2023 07:00  --------------------------------------------------------  IN: 1210 mL / OUT: 550 mL / NET: 660 mL    02 Jul 2023 07:01  -  02 Jul 2023 22:11  --------------------------------------------------------  IN: 960 mL / OUT: 300 mL / NET: 660 mL        PHYSICAL EXAM:  GENERAL: NAD, well-developed  HEAD:  Atraumatic, Normocephalic  EYES: EOMI, PERRLA, conjunctiva and sclera clear  NECK: Supple, No JVD  CHEST/LUNG: Clear to auscultation bilaterally; No wheeze  HEART: Regular rate and rhythm; No murmurs, rubs, or gallops  ABDOMEN: Soft, Nontender, Nondistended; Bowel sounds present  EXTREMITIES:  2+ Peripheral Pulses, No clubbing, cyanosis, or edema  PSYCH: AAOx3  NEUROLOGY: non-focal  SKIN: No rashes or lesions    LABS:                        13.4   6.64  )-----------( 364      ( 02 Jul 2023 06:54 )             39.8     07-02    139  |  101  |  19  ----------------------------<  105<H>  3.6   |  25  |  0.85    Ca    9.2      02 Jul 2023 06:54  Phos  3.2     07-02  Mg     2.2     07-02            Urinalysis Basic - ( 02 Jul 2023 06:54 )    Color: x / Appearance: x / SG: x / pH: x  Gluc: 105 mg/dL / Ketone: x  / Bili: x / Urobili: x   Blood: x / Protein: x / Nitrite: x   Leuk Esterase: x / RBC: x / WBC x   Sq Epi: x / Non Sq Epi: x / Bacteria: x        RADIOLOGY & ADDITIONAL TESTS:    Imaging Personally Reviewed:    Consultant(s) Notes Reviewed:      Care Discussed with Consultants/Other Providers:

## 2023-07-03 LAB
GLUCOSE BLDC GLUCOMTR-MCNC: 110 MG/DL — HIGH (ref 70–99)
GLUCOSE BLDC GLUCOMTR-MCNC: 124 MG/DL — HIGH (ref 70–99)
GLUCOSE BLDC GLUCOMTR-MCNC: 151 MG/DL — HIGH (ref 70–99)
GLUCOSE BLDC GLUCOMTR-MCNC: 90 MG/DL — SIGNIFICANT CHANGE UP (ref 70–99)
GLUCOSE BLDC GLUCOMTR-MCNC: 95 MG/DL — SIGNIFICANT CHANGE UP (ref 70–99)

## 2023-07-03 PROCEDURE — 71045 X-RAY EXAM CHEST 1 VIEW: CPT | Mod: 26

## 2023-07-03 PROCEDURE — 75561 CARDIAC MRI FOR MORPH W/DYE: CPT | Mod: 26

## 2023-07-03 PROCEDURE — 99232 SBSQ HOSP IP/OBS MODERATE 35: CPT

## 2023-07-03 PROCEDURE — 99233 SBSQ HOSP IP/OBS HIGH 50: CPT

## 2023-07-03 RX ORDER — GUAIFENESIN/DEXTROMETHORPHAN 600MG-30MG
10 TABLET, EXTENDED RELEASE 12 HR ORAL EVERY 6 HOURS
Refills: 0 | Status: DISCONTINUED | OUTPATIENT
Start: 2023-07-03 | End: 2023-07-04

## 2023-07-03 RX ADMIN — Medication 3 MILLILITER(S): at 18:23

## 2023-07-03 RX ADMIN — ATORVASTATIN CALCIUM 80 MILLIGRAM(S): 80 TABLET, FILM COATED ORAL at 23:52

## 2023-07-03 RX ADMIN — SACUBITRIL AND VALSARTAN 1 TABLET(S): 24; 26 TABLET, FILM COATED ORAL at 05:58

## 2023-07-03 RX ADMIN — SACUBITRIL AND VALSARTAN 1 TABLET(S): 24; 26 TABLET, FILM COATED ORAL at 18:24

## 2023-07-03 RX ADMIN — Medication 3 MILLILITER(S): at 23:51

## 2023-07-03 RX ADMIN — HEPARIN SODIUM 5000 UNIT(S): 5000 INJECTION INTRAVENOUS; SUBCUTANEOUS at 05:58

## 2023-07-03 RX ADMIN — Medication 10 MILLILITER(S): at 23:52

## 2023-07-03 RX ADMIN — Medication 10 MILLILITER(S): at 12:00

## 2023-07-03 RX ADMIN — Medication 10 MILLILITER(S): at 18:23

## 2023-07-03 RX ADMIN — HEPARIN SODIUM 5000 UNIT(S): 5000 INJECTION INTRAVENOUS; SUBCUTANEOUS at 18:24

## 2023-07-03 RX ADMIN — Medication 50 MILLIGRAM(S): at 05:58

## 2023-07-03 RX ADMIN — Medication 3 MILLILITER(S): at 05:58

## 2023-07-03 RX ADMIN — Medication 3 MILLILITER(S): at 00:07

## 2023-07-03 RX ADMIN — Medication 200 MILLIGRAM(S): at 06:15

## 2023-07-03 RX ADMIN — Medication 200 MILLIGRAM(S): at 00:06

## 2023-07-03 RX ADMIN — Medication 1: at 18:24

## 2023-07-03 RX ADMIN — Medication 3 MILLILITER(S): at 12:00

## 2023-07-03 RX ADMIN — Medication 81 MILLIGRAM(S): at 11:59

## 2023-07-03 RX ADMIN — Medication 6 UNIT(S): at 12:01

## 2023-07-03 RX ADMIN — Medication 6 UNIT(S): at 09:04

## 2023-07-03 RX ADMIN — Medication 6 UNIT(S): at 18:24

## 2023-07-03 NOTE — PROGRESS NOTE ADULT - SUBJECTIVE AND OBJECTIVE BOX
ID: [id number] with CAD s/p 3 stents, HTN, T2DM, admitted for pneumonia, cardiology consulted for ST depressions and LVH noted on EKG. ACS ruled out however echo showing down ef to 30's and segmental WMA. Received LHC as above showing TVD. Ongoing planning for complex PCI.Patient seen and examined at bedside.    Overnight Events: ***INCOMPLETE NOTE    Telemetry:         Current Meds:  albuterol/ipratropium for Nebulization 3 milliLiter(s) Nebulizer every 6 hours  aspirin enteric coated 81 milliGRAM(s) Oral daily  atorvastatin 80 milliGRAM(s) Oral at bedtime  benzonatate 100 milliGRAM(s) Oral three times a day PRN  dextrose 5%. 1000 milliLiter(s) IV Continuous <Continuous>  dextrose 5%. 1000 milliLiter(s) IV Continuous <Continuous>  dextrose 50% Injectable 12.5 Gram(s) IV Push once  dextrose 50% Injectable 25 Gram(s) IV Push once  dextrose 50% Injectable 25 Gram(s) IV Push once  dextrose Oral Gel 15 Gram(s) Oral once PRN  glucagon  Injectable 1 milliGRAM(s) IntraMuscular once  guaiFENesin Oral Liquid (Sugar-Free) 200 milliGRAM(s) Oral every 6 hours PRN  heparin   Injectable 5000 Unit(s) SubCutaneous every 12 hours  insulin glargine Injectable (LANTUS) 18 Unit(s) SubCutaneous at bedtime  insulin lispro (ADMELOG) corrective regimen sliding scale   SubCutaneous three times a day before meals  insulin lispro (ADMELOG) corrective regimen sliding scale   SubCutaneous at bedtime  insulin lispro Injectable (ADMELOG) 6 Unit(s) SubCutaneous three times a day before meals  metoprolol succinate ER 50 milliGRAM(s) Oral daily  sacubitril 49 mG/valsartan 51 mG 1 Tablet(s) Oral two times a day      Vitals:  T(F): 97.9 (07-03), Max: 97.9 (07-02)  HR: 63 (07-03) (63 - 89)  BP: 126/73 (07-03) (126/73 - 148/86)  RR: 18 (07-03)  SpO2: 97% (07-03)  I&O's Summary    02 Jul 2023 07:01  -  03 Jul 2023 07:00  --------------------------------------------------------  IN: 1080 mL / OUT: 500 mL / NET: 580 mL        Physical Exam:  Appearance: No acute distress; well appearing  Eyes: PERRL, EOMI, pink conjunctiva  HEENT: Normal oral mucosa  Cardiovascular: RRR, S1, S2, no murmurs, rubs, or gallops; no edema; no JVD  Respiratory: Clear to auscultation bilaterally  Gastrointestinal: soft, non-tender, non-distended with normal bowel sounds  Musculoskeletal: No clubbing; no joint deformity   Neurologic: Non-focal  Lymphatic: No lymphadenopathy  Psychiatry: AAOx3, mood & affect appropriate  Skin: No rashes, ecchymoses, or cyanosis                          13.4   6.64  )-----------( 364      ( 02 Jul 2023 06:54 )             39.8     07-02    139  |  101  |  19  ----------------------------<  105<H>  3.6   |  25  |  0.85    Ca    9.2      02 Jul 2023 06:54  Phos  3.2     07-02  Mg     2.2     07-02        CARDIAC MARKERS ( 26 Jun 2023 19:47 )  22 ng/L / x     / x     / x     / x     / x      CARDIAC MARKERS ( 26 Jun 2023 12:00 )  21 ng/L / x     / x     / x     / x     / x        New ECG(s): Personally reviewed    A/P  73M with CAD s/p 3 stents, HTN, T2DM, admitted for pneumonia, cardiology consulted for ST depressions and LVH noted on EKG. ACS ruled out however echo showing down ef to 30's and segmental WMA. Received LHC as above showing TVD. Ongoing planning for complex PCI.    #ST Depressions  #LVH  #Hypertensive urgency  #HFrEF w/ Focal WMA    Recs:  - TTE w/ EF 30-35%. Stage B, Killips 1  - euvolemic on exam  - Continue entresto 49/51mg bid  - Continue metop succinate 50mg daily  - Will consider further uptitration of entresto/addition of MRA in coming days  - LHC with triple vessel disease. CTS consulted w/ no role for CTS. Now planning for complex PCI, will discuss w/ Dr. Hernandez, possible cMRI for viability  - c/w Aspirin, atorva 80   - A1c 10,  HDL 43 Cholesterol 183 tgl 136. Thyroid function c/w euthyroid sick syndrome   - Management of probable bronchitis/PNA as per primary team (on CTX). CT changes (RUL GG nodules) to be repeated in 3 months        Jason Allison PGY5  Cardiology Fellow    DHEERAJ ID: [id number] with CAD s/p 3 stents, HTN, T2DM, admitted for pneumonia, cardiology consulted for ST depressions and LVH noted on EKG. ACS ruled out however echo showing down ef to 30's and segmental WMA. Received LHC as above showing TVD. Ongoing planning for complex PCI.Patient seen and examined at bedside.    Overnight Events: NAEON, no further cp, endorsing mild cough today    Telemetry: NSR     Current Meds:  albuterol/ipratropium for Nebulization 3 milliLiter(s) Nebulizer every 6 hours  aspirin enteric coated 81 milliGRAM(s) Oral daily  atorvastatin 80 milliGRAM(s) Oral at bedtime  benzonatate 100 milliGRAM(s) Oral three times a day PRN  dextrose 5%. 1000 milliLiter(s) IV Continuous <Continuous>  dextrose 5%. 1000 milliLiter(s) IV Continuous <Continuous>  dextrose 50% Injectable 12.5 Gram(s) IV Push once  dextrose 50% Injectable 25 Gram(s) IV Push once  dextrose 50% Injectable 25 Gram(s) IV Push once  dextrose Oral Gel 15 Gram(s) Oral once PRN  glucagon  Injectable 1 milliGRAM(s) IntraMuscular once  guaiFENesin Oral Liquid (Sugar-Free) 200 milliGRAM(s) Oral every 6 hours PRN  heparin   Injectable 5000 Unit(s) SubCutaneous every 12 hours  insulin glargine Injectable (LANTUS) 18 Unit(s) SubCutaneous at bedtime  insulin lispro (ADMELOG) corrective regimen sliding scale   SubCutaneous three times a day before meals  insulin lispro (ADMELOG) corrective regimen sliding scale   SubCutaneous at bedtime  insulin lispro Injectable (ADMELOG) 6 Unit(s) SubCutaneous three times a day before meals  metoprolol succinate ER 50 milliGRAM(s) Oral daily  sacubitril 49 mG/valsartan 51 mG 1 Tablet(s) Oral two times a day      Vitals:  T(F): 97.9 (07-03), Max: 97.9 (07-02)  HR: 63 (07-03) (63 - 89)  BP: 126/73 (07-03) (126/73 - 148/86)  RR: 18 (07-03)  SpO2: 97% (07-03)  I&O's Summary    02 Jul 2023 07:01  -  03 Jul 2023 07:00  --------------------------------------------------------  IN: 1080 mL / OUT: 500 mL / NET: 580 mL        Physical Exam:  Appearance: No acute distress; well appearing  Eyes: PERRL, EOMI, pink conjunctiva  HEENT: Normal oral mucosa  Cardiovascular: RRR, S1, S2, no murmurs, rubs, or gallops; no edema; no JVD  Respiratory: Clear to auscultation bilaterally  Gastrointestinal: soft, non-tender, non-distended with normal bowel sounds  Musculoskeletal: No clubbing; no joint deformity   Neurologic: Non-focal  Lymphatic: No lymphadenopathy  Psychiatry: AAOx3, mood & affect appropriate  Skin: No rashes, ecchymoses, or cyanosis                          13.4   6.64  )-----------( 364      ( 02 Jul 2023 06:54 )             39.8     07-02    139  |  101  |  19  ----------------------------<  105<H>  3.6   |  25  |  0.85    Ca    9.2      02 Jul 2023 06:54  Phos  3.2     07-02  Mg     2.2     07-02        CARDIAC MARKERS ( 26 Jun 2023 19:47 )  22 ng/L / x     / x     / x     / x     / x      CARDIAC MARKERS ( 26 Jun 2023 12:00 )  21 ng/L / x     / x     / x     / x     / x        New ECG(s): Personally reviewed    A/P  73M with CAD s/p 3 stents, HTN, T2DM, admitted for pneumonia, cardiology consulted for ST depressions and LVH noted on EKG. ACS ruled out however echo showing down ef to 30's and segmental WMA. Received LH as above showing TVD. Ongoing planning for complex PCI.    #ST Depressions  #LVH  #Hypertensive urgency  #HFrEF w/ Focal WMA    Recs:  - TTE w/ EF 30-35%. Stage B, Killips 1  - euvolemic on exam  - Continue entresto 49/51mg bid  - Continue metop succinate 50mg daily  - Will consider further uptitration of entresto/addition of MRA in coming days  - LHC with triple vessel disease. CTS consulted w/ no role for CTS. Now planning for complex PCI, will discuss w/ Dr. Hernandez, possible cMRI for viability    > F/U CMR   - c/w Aspirin, atorva 80   - A1c 10,  HDL 43 Cholesterol 183 tgl 136. Thyroid function c/w euthyroid sick syndrome   - Management of probable bronchitis/PNA as per primary team (on CTX). CT changes (RUL GG nodules) to be repeated in 3 months        Jason Allison PGY5  Cardiology Fellow    DHEERAJ ID: [id number] with CAD s/p 3 stents, HTN, T2DM, admitted for pneumonia, cardiology consulted for ST depressions and LVH noted on EKG. ACS ruled out however echo showing down ef to 30's and segmental WMA. Received LHC as above showing TVD. Ongoing planning for complex PCI.Patient seen and examined at bedside.    Overnight Events: NAEON, no further cp, endorsing mild cough today    Telemetry: NSR     Current Meds:  albuterol/ipratropium for Nebulization 3 milliLiter(s) Nebulizer every 6 hours  aspirin enteric coated 81 milliGRAM(s) Oral daily  atorvastatin 80 milliGRAM(s) Oral at bedtime  benzonatate 100 milliGRAM(s) Oral three times a day PRN  dextrose 5%. 1000 milliLiter(s) IV Continuous <Continuous>  dextrose 5%. 1000 milliLiter(s) IV Continuous <Continuous>  dextrose 50% Injectable 12.5 Gram(s) IV Push once  dextrose 50% Injectable 25 Gram(s) IV Push once  dextrose 50% Injectable 25 Gram(s) IV Push once  dextrose Oral Gel 15 Gram(s) Oral once PRN  glucagon  Injectable 1 milliGRAM(s) IntraMuscular once  guaiFENesin Oral Liquid (Sugar-Free) 200 milliGRAM(s) Oral every 6 hours PRN  heparin   Injectable 5000 Unit(s) SubCutaneous every 12 hours  insulin glargine Injectable (LANTUS) 18 Unit(s) SubCutaneous at bedtime  insulin lispro (ADMELOG) corrective regimen sliding scale   SubCutaneous three times a day before meals  insulin lispro (ADMELOG) corrective regimen sliding scale   SubCutaneous at bedtime  insulin lispro Injectable (ADMELOG) 6 Unit(s) SubCutaneous three times a day before meals  metoprolol succinate ER 50 milliGRAM(s) Oral daily  sacubitril 49 mG/valsartan 51 mG 1 Tablet(s) Oral two times a day      Vitals:  T(F): 97.9 (07-03), Max: 97.9 (07-02)  HR: 63 (07-03) (63 - 89)  BP: 126/73 (07-03) (126/73 - 148/86)  RR: 18 (07-03)  SpO2: 97% (07-03)  I&O's Summary    02 Jul 2023 07:01  -  03 Jul 2023 07:00  --------------------------------------------------------  IN: 1080 mL / OUT: 500 mL / NET: 580 mL        Physical Exam:  Appearance: No acute distress; well appearing  Eyes: PERRL, EOMI, pink conjunctiva  HEENT: Normal oral mucosa  Cardiovascular: RRR, S1, S2, no murmurs, rubs, or gallops; no edema; no JVD  Respiratory: Clear to auscultation bilaterally  Gastrointestinal: soft, non-tender, non-distended with normal bowel sounds  Musculoskeletal: No clubbing; no joint deformity   Neurologic: Non-focal  Lymphatic: No lymphadenopathy  Psychiatry: AAOx3, mood & affect appropriate  Skin: No rashes, ecchymoses, or cyanosis                          13.4   6.64  )-----------( 364      ( 02 Jul 2023 06:54 )             39.8     07-02    139  |  101  |  19  ----------------------------<  105<H>  3.6   |  25  |  0.85    Ca    9.2      02 Jul 2023 06:54  Phos  3.2     07-02  Mg     2.2     07-02        CARDIAC MARKERS ( 26 Jun 2023 19:47 )  22 ng/L / x     / x     / x     / x     / x      CARDIAC MARKERS ( 26 Jun 2023 12:00 )  21 ng/L / x     / x     / x     / x     / x        New ECG(s): Personally reviewed    A/P  73M with CAD s/p 3 stents, HTN, T2DM, admitted for pneumonia, cardiology consulted for ST depressions and LVH noted on EKG. ACS ruled out however echo showing down ef to 30's and segmental WMA. Received LH as above showing TVD. Ongoing planning for complex PCI.    #ST Depressions  #LVH  #Hypertensive urgency  #HFrEF w/ Focal WMA    Recs:  - TTE w/ EF 30-35%. Stage B, Killips 1  - euvolemic on exam  - Continue entresto 49/51mg bid  - Continue metop succinate 50mg daily  - Will consider further uptitration of entresto/addition of MRA in coming days  - LHC with triple vessel disease. CTS consulted w/ no role for CTS. Now planning for complex PCI, will discuss w/ Dr. Hernandez, possible cMRI for viability    > F/U CMR   - c/w Aspirin, atorva 80   - A1c 10,  HDL 43 Cholesterol 183 tgl 136. Thyroid function c/w euthyroid sick syndrome   - Management of probable bronchitis/PNA as per primary team (on CTX). CT changes (RUL GG nodules) to be repeated in 3 months        Jason Allison PGY5  Cardiology Fellow    DWA    This patient was seen and examined personally by me and the plan was discussed with the fellow and/or resident above. Amendments were made as necessary to the above. Agree with the excellent note and plan above. CMR pending.    Saran Elmore MD, MPhil, [facility]  Cardiologist, [facility]  ; [facility] and [facility]  Email: [email or web address] on-service contact/call information, go to [email or web address] and use "[email or web address]" to login.  Outpatient Cardiology appointments, call [phone] to arrange with a colleague; I do not have outpatient Cardiology clinic.

## 2023-07-03 NOTE — PROGRESS NOTE ADULT - SUBJECTIVE AND OBJECTIVE BOX
Follow-up Pulm Progress Note    c/o mostly dry cough, intermittent congestion  denies SOB/CP      Medications:  MEDICATIONS  (STANDING):  albuterol/ipratropium for Nebulization 3 milliLiter(s) Nebulizer every 6 hours  aspirin enteric coated 81 milliGRAM(s) Oral daily  atorvastatin 80 milliGRAM(s) Oral at bedtime  dextrose 5%. 1000 milliLiter(s) (100 mL/Hr) IV Continuous <Continuous>  dextrose 5%. 1000 milliLiter(s) (50 mL/Hr) IV Continuous <Continuous>  dextrose 50% Injectable 25 Gram(s) IV Push once  dextrose 50% Injectable 25 Gram(s) IV Push once  dextrose 50% Injectable 12.5 Gram(s) IV Push once  glucagon  Injectable 1 milliGRAM(s) IntraMuscular once  heparin   Injectable 5000 Unit(s) SubCutaneous every 12 hours  insulin glargine Injectable (LANTUS) 18 Unit(s) SubCutaneous at bedtime  insulin lispro (ADMELOG) corrective regimen sliding scale   SubCutaneous at bedtime  insulin lispro (ADMELOG) corrective regimen sliding scale   SubCutaneous three times a day before meals  insulin lispro Injectable (ADMELOG) 6 Unit(s) SubCutaneous three times a day before meals  metoprolol succinate ER 50 milliGRAM(s) Oral daily  sacubitril 49 mG/valsartan 51 mG 1 Tablet(s) Oral two times a day    MEDICATIONS  (PRN):  dextrose Oral Gel 15 Gram(s) Oral once PRN Blood Glucose LESS THAN 70 milliGRAM(s)/deciliter          Vital Signs Last 24 Hrs  T(C): 36.6 (03 Jul 2023 05:39), Max: 36.6 (02 Jul 2023 09:20)  T(F): 97.9 (03 Jul 2023 05:39), Max: 97.9 (02 Jul 2023 09:20)  HR: 63 (03 Jul 2023 05:39) (63 - 89)  BP: 126/73 (03 Jul 2023 05:39) (126/73 - 148/86)  BP(mean): --  RR: 18 (03 Jul 2023 05:39) (18 - 18)  SpO2: 97% (03 Jul 2023 05:39) (93% - 97%)    Parameters below as of 03 Jul 2023 05:39  Patient On (Oxygen Delivery Method): room air              07-02 @ 07:01  -  07-03 @ 07:00  --------------------------------------------------------  IN: 1230 mL / OUT: 900 mL / NET: 330 mL          LABS:                        13.4   6.64  )-----------( 364      ( 02 Jul 2023 06:54 )             39.8     07-02    139  |  101  |  19  ----------------------------<  105<H>  3.6   |  25  |  0.85    Ca    9.2      02 Jul 2023 06:54  Phos  3.2     07-02  Mg     2.2     07-02            CAPILLARY BLOOD GLUCOSE      POCT Blood Glucose.: 125 mg/dL (02 Jul 2023 21:36)      Urinalysis Basic - ( 02 Jul 2023 06:54 )    Color: x / Appearance: x / SG: x / pH: x  Gluc: 105 mg/dL / Ketone: x  / Bili: x / Urobili: x   Blood: x / Protein: x / Nitrite: x   Leuk Esterase: x / RBC: x / WBC x   Sq Epi: x / Non Sq Epi: x / Bacteria: x              Physical Examination:  PULM: CTA bilaterally   CVS: S1, S2 heard        RADIOLOGY REVIEWED    CT chest: < from: CT Chest No Cont (06.27.23 @ 11:01) >  FINDINGS:    LYMPH NODES: No lymphadenopathy.    HEART/VASCULATURE: The heart is normal in size. Aortic and coronary   artery calcifications. No pericardial effusion. Bilateral partially   calcified pleural plaques compatible with asbestos exposure.    AIRWAYS/LUNGS/PLEURA: The central airways are patent. Bilateral lower   lobe dependent linear opacities. Right upper lobe subsolid nodule   measures 6 mm (3-47). Right upper lobe perifissural ground glass nodule   measures 5 mm (7-144, 3-60). Mild peribronchovascular groundglass in the   lateral segment of the right middle lobe. No pleural effusion or   pneumothorax.    UPPER ABDOMEN: Unremarkable.    BONES/SOFT TISSUES: Mild degenerative changes.    IMPRESSION:    Bilateral lower lobe dependent atelectasis.    Mild right middle lobe groundglass and small right upper lobe ground   glass nodules which maybe inflammatory. Recommend CT chest in 3 months   to see if they persist.    < end of copied text >      TTE: < from: TTE W or WO Ultrasound Enhancing Agent (06.27.23 @ 14:05) >  CONCLUSIONS:      1. Normal left ventricular cavity size. The left ventricular wall thickness is normal. The left ventricular systolic function is severely decreased with an ejection fraction visually estimated at 30 to 35 %. There are regional wall motion abnormalities No evidence of a thrombus in the left ventricle.   2. Multiple segmental abnormalities exist. See findings.   3. There is mild (grade 1) left ventricular diastolic dysfunction, with normal filling pressure.   4. Normal right ventricular cavity size, normal right ventricular wall thickness and normal right ventricular systolic function. The tricuspid annular plane systolic excursion (TAPSE) is 2.1 cm (normal >=1.7 cm).   5. The right atrium is normal.   6. No pericardial effusion seen.   7. No prior echocardiogram is available for comparison.   8. Symmetric mitral valve leaflet tethering.   9. There is normal LV mass and normal geometry.    ________________________________________________________________________________________  FINDINGS:     Left Ventricle:  Normal left ventricular cavity size. The left ventricular wall thickness is normal. The left ventricular systolic function is severely decreased with an ejection fraction visually estimated at 30 to 35%. There are regional wall motion abnormalities consistent with ischemic heart disease. There is mild (grade 1) left ventricular diastolic dysfunction, with normal filling pressure. There is normal LV mass and normal geometry. There is no evidence of a thrombus in the left ventricle.  LV Wall Scoring: The apex is akinetic. The mid and apical anterior septum, mid  inferolateral segment, apical lateral segment, apical anterior segment, and  basal inferior segment are hypokinetic. All remaining scored segments are  normal.          Right Ventricle:  Normal right ventricular cavity size, normal wall thickness and normal right ventricular systolic function. Tricuspid annular plane systolic excursion (TAPSE) is 2.1 cm (normal >=1.7 cm).     Left Atrium:  The left atrium is normal.     Right Atrium:  The right atrium is normal.     Aortic Valve:  The aortic valve is tricuspid with normal structure without stenosis. There is no evidence of aortic regurgitation.     Mitral Valve:  Structurally normal mitral valve with normal leaflet opening and closing, without any evidence of mitral stenosis or significant regurgitation. There is symmetric leaflet tethering. There is trace mitral regurgitation.    Tricuspid Valve:  Structurally normal tricuspid valve with normal leaflet excursion. There is trace tricuspid regurgitation.     Pulmonic Valve:  Structurally normal pulmonic valve with normal leaflet excursion. There is trace pulmonic regurgitation.     Aorta:  The aortic annulus and aortic root appear normal in size. Normal aorta sinus of Valsalva, measuring 3.20 cm (indexed 2.05 cm/m²).     Pericardium:  No pericardial effusion seen.  ____________________________________________________________________  Quantitative Data:  Left Ventricle Measurements: (Indexed to BSA)     IVSd (2D):   0.9 cm  LVPWd (2D):  0.9 cm  LVIDd (2D):  4.9 cm  LVIDs (2D):  4.1 cm  LV Mass:     151 g  96.9 g/m²  Visualized LV EF%: 30 to 35%     MV E Vmax:    0.57 m/s  MV A Vmax:    1.16 m/s  MV E/A:       0.49  e' lateral:   5.98 cm/s  e' medial:    4.13 cm/s  E/e' lateral: 9.55  E/e' medial:  13.83  E/e' Average: 11.30    Aorta Measurements:     Ao Sinus:      3.2 cm  Ao Root:       3.2 cm  Ao Root s, 2D: 3.2 cm       Left Atrium Measurements: (Indexed to BSA)  LA Diam 2D: 3.40 cm    Right Ventricle Measurements:     TAPSE: 2.1 cm       LVOT / RVOT/ Qp/Qs Data: (Indexed to BSA)  Mitral Valve Measurements:     MV E Vmax: 0.6 m/s  MV A Vmax: 1.2 m/s  MV E/A:   0.5       --------------------------------------------------------------------------------  TomTec:  LV Analysis:  EF: 25 %      < end of copied text >

## 2023-07-03 NOTE — PROGRESS NOTE ADULT - PROBLEM SELECTOR PLAN 1
acute bronchitis - CT chest with no clear PNA, few GGO/nodules  -Small L basilar opacity seen on CXR corresponds to atelectasis seen on CT chest   -Cough x 2 weeks, rhonchi L base (now improving)  -S/p Ceftriaxone x 5 days   -Continue Duoneb q6h  -Normoxic, keep sats >90% with o2 PRN.  -Continued cough, start Robitussin DM 10cc q6h  -CXR ordered

## 2023-07-03 NOTE — PROGRESS NOTE ADULT - SUBJECTIVE AND OBJECTIVE BOX
Patient is a 73y old  Male who presents with a chief complaint of SOB (29 Jun 2023 17:54)      SUBJECTIVE / OVERNIGHT EVENTS:    MEDICATIONS  (STANDING):  albuterol/ipratropium for Nebulization 3 milliLiter(s) Nebulizer every 6 hours  aspirin enteric coated 81 milliGRAM(s) Oral daily  atorvastatin 80 milliGRAM(s) Oral at bedtime  dextrose 5%. 1000 milliLiter(s) (100 mL/Hr) IV Continuous <Continuous>  dextrose 5%. 1000 milliLiter(s) (50 mL/Hr) IV Continuous <Continuous>  dextrose 50% Injectable 12.5 Gram(s) IV Push once  dextrose 50% Injectable 25 Gram(s) IV Push once  dextrose 50% Injectable 25 Gram(s) IV Push once  glucagon  Injectable 1 milliGRAM(s) IntraMuscular once  guaifenesin/dextromethorphan Oral Liquid 10 milliLiter(s) Oral every 6 hours  heparin   Injectable 5000 Unit(s) SubCutaneous every 12 hours  insulin glargine Injectable (LANTUS) 18 Unit(s) SubCutaneous at bedtime  insulin lispro (ADMELOG) corrective regimen sliding scale   SubCutaneous at bedtime  insulin lispro (ADMELOG) corrective regimen sliding scale   SubCutaneous three times a day before meals  insulin lispro Injectable (ADMELOG) 6 Unit(s) SubCutaneous three times a day before meals  metoprolol succinate ER 50 milliGRAM(s) Oral daily  sacubitril 49 mG/valsartan 51 mG 1 Tablet(s) Oral two times a day    MEDICATIONS  (PRN):  dextrose Oral Gel 15 Gram(s) Oral once PRN Blood Glucose LESS THAN 70 milliGRAM(s)/deciliter      Vital Signs Last 24 Hrs  T(F): 97.6 (07-03-23 @ 17:00), Max: 97.9 (07-03-23 @ 05:39)  HR: 76 (07-03-23 @ 17:00) (63 - 89)  BP: 133/72 (07-03-23 @ 17:00) (121/72 - 136/79)  RR: 18 (07-03-23 @ 17:00) (18 - 18)  SpO2: 99% (07-03-23 @ 17:00) (93% - 99%)  Telemetry:   CAPILLARY BLOOD GLUCOSE      POCT Blood Glucose.: 95 mg/dL (03 Jul 2023 21:26)  POCT Blood Glucose.: 151 mg/dL (03 Jul 2023 17:38)  POCT Blood Glucose.: 124 mg/dL (03 Jul 2023 11:48)  POCT Blood Glucose.: 110 mg/dL (03 Jul 2023 08:46)    I&O's Summary    02 Jul 2023 07:01  -  03 Jul 2023 07:00  --------------------------------------------------------  IN: 1230 mL / OUT: 900 mL / NET: 330 mL    03 Jul 2023 07:01  -  03 Jul 2023 22:07  --------------------------------------------------------  IN: 840 mL / OUT: 0 mL / NET: 840 mL        PHYSICAL EXAM:  GENERAL: NAD, well-developed  HEAD:  Atraumatic, Normocephalic  EYES: EOMI, PERRLA, conjunctiva and sclera clear  NECK: Supple, No JVD  CHEST/LUNG: Clear to auscultation bilaterally; No wheeze  HEART: Regular rate and rhythm; No murmurs, rubs, or gallops  ABDOMEN: Soft, Nontender, Nondistended; Bowel sounds present  EXTREMITIES:  2+ Peripheral Pulses, No clubbing, cyanosis, or edema  PSYCH: AAOx3  NEUROLOGY: non-focal  SKIN: No rashes or lesions    LABS:                        13.4   6.64  )-----------( 364      ( 02 Jul 2023 06:54 )             39.8     07-02    139  |  101  |  19  ----------------------------<  105<H>  3.6   |  25  |  0.85    Ca    9.2      02 Jul 2023 06:54  Phos  3.2     07-02  Mg     2.2     07-02            Urinalysis Basic - ( 02 Jul 2023 06:54 )    Color: x / Appearance: x / SG: x / pH: x  Gluc: 105 mg/dL / Ketone: x  / Bili: x / Urobili: x   Blood: x / Protein: x / Nitrite: x   Leuk Esterase: x / RBC: x / WBC x   Sq Epi: x / Non Sq Epi: x / Bacteria: x        RADIOLOGY & ADDITIONAL TESTS:    Imaging Personally Reviewed:    Consultant(s) Notes Reviewed:      Care Discussed with Consultants/Other Providers:

## 2023-07-03 NOTE — PROGRESS NOTE ADULT - PROBLEM SELECTOR PLAN 1
- Test BGs ACTID and at HS  - Continue Lantus 18 units nightly   - Continue Admelog 6 units before meals, Hold if NPO or not eating  - low admelog correction scale before meals and before bedtime  -carb consistent diet  -RD consult  -hypoglycemia protocol in place  -Please educate patient for insulin pen use and     Discharge  - Will be determine based on insulin requirement, BG trends and oral intake  - Limited options for diabetes medications due to undocumented status with no insurance.   - Basal insulin + oral regimen -SGLT2 ( Farxiga or Jardiance ) given Heart failure  and Metformin  - Can apply for [facility] for insulin assistance  - if he is not eligible for [facility], not able to get insulin due to cost, then  can do Insulin 70/30  + optimized dose of Metformin   - Make sure pt has glucometer, strips and Lancets  - Follow up at endocrine clinic if medicaid approved

## 2023-07-03 NOTE — PROGRESS NOTE ADULT - SUBJECTIVE AND OBJECTIVE BOX
Seen earlier today, son at the bedside translated as per patient's request     Chief Complaint: Diabetes Mellitus follow up    INTERVAL HX: Tolerating POs with good appetite. BGs in 100s with  at goal. According to chart review, planning for complex PCI, cMRI pending for viability.     Review of Systems:  General: As above  GI: No nausea, vomiting  Endocrine: no  S&Sx of hypoglycemia    Allergies    No Known Allergies    Intolerances      MEDICATIONS  (STANDING):  atorvastatin 80 milliGRAM(s) Oral at bedtime  dextrose 5%. 1000 milliLiter(s) (100 mL/Hr) IV Continuous <Continuous>  dextrose 5%. 1000 milliLiter(s) (50 mL/Hr) IV Continuous <Continuous>  dextrose 50% Injectable 12.5 Gram(s) IV Push once  dextrose 50% Injectable 25 Gram(s) IV Push once  dextrose 50% Injectable 25 Gram(s) IV Push once  glucagon  Injectable 1 milliGRAM(s) IntraMuscular once  insulin glargine Injectable (LANTUS) 18 Unit(s) SubCutaneous at bedtime  insulin lispro (ADMELOG) corrective regimen sliding scale   SubCutaneous at bedtime  insulin lispro (ADMELOG) corrective regimen sliding scale   SubCutaneous three times a day before meals  insulin lispro Injectable (ADMELOG) 6 Unit(s) SubCutaneous three times a day before meals  metoprolol succinate ER 50 milliGRAM(s) Oral daily  sacubitril 49 mG/valsartan 51 mG 1 Tablet(s) Oral two times a day      atorvastatin   80 milliGRAM(s) Oral (07-02-23 @ 21:39)    insulin glargine Injectable (LANTUS)   18 Unit(s) SubCutaneous (07-02-23 @ 21:39)    insulin lispro (ADMELOG) corrective regimen sliding scale   1 Unit(s) SubCutaneous (07-02-23 @ 18:41)    insulin lispro Injectable (ADMELOG)   6 Unit(s) SubCutaneous (07-03-23 @ 12:01)   6 Unit(s) SubCutaneous (07-03-23 @ 09:04)   6 Unit(s) SubCutaneous (07-02-23 @ 18:42)        PHYSICAL EXAM:  VITALS: T(C): 36.6 (07-03-23 @ 13:09)  T(F): 97.8 (07-03-23 @ 13:09), Max: 97.9 (07-03-23 @ 05:39)  HR: 75 (07-03-23 @ 13:09) (63 - 89)  BP: 121/72 (07-03-23 @ 13:09) (121/72 - 136/79)  RR:  (18 - 18)  SpO2:  (93% - 97%)  Wt(kg): --  GENERAL: Estonian speaking male sitting in bed, in NAD  Respiratory: Respirations unlabored   Extremities: Warm, no edema  NEURO: Alert , appropriate     LABS:  POCT Blood Glucose.: 124 mg/dL (07-03-23 @ 11:48)  POCT Blood Glucose.: 110 mg/dL (07-03-23 @ 08:46)  POCT Blood Glucose.: 125 mg/dL (07-02-23 @ 21:36)  POCT Blood Glucose.: 178 mg/dL (07-02-23 @ 18:10)  POCT Blood Glucose.: 138 mg/dL (07-02-23 @ 13:00)  POCT Blood Glucose.: 125 mg/dL (07-02-23 @ 08:51)  POCT Blood Glucose.: 189 mg/dL (07-01-23 @ 20:54)  POCT Blood Glucose.: 90 mg/dL (07-01-23 @ 17:21)  POCT Blood Glucose.: 149 mg/dL (07-01-23 @ 12:42)  POCT Blood Glucose.: 138 mg/dL (07-01-23 @ 09:24)  POCT Blood Glucose.: 210 mg/dL (06-30-23 @ 22:12)  POCT Blood Glucose.: 164 mg/dL (06-30-23 @ 18:04)                          13.4   6.64  )-----------( 364      ( 02 Jul 2023 06:54 )             39.8     07-02    139  |  101  |  19  ----------------------------<  105<H>  3.6   |  25  |  0.85    Ca    9.2      02 Jul 2023 06:54  Phos  3.2     07-02  Mg     2.2     07-02        06-27 Chol 183 Direct LDL -- LDL calculated 114<H> HDL 43 Trig 136  Thyroid Function Tests:  06-29 @ 19:11 TSH 4.73 FreeT4 -- T3 66 Anti TPO -- Anti Thyroglobulin Ab -- TSI --  06-27 @ 07:41 TSH 5.56 FreeT4 -- T3 -- Anti TPO -- Anti Thyroglobulin Ab -- TSI --      A1C with Estimated Average Glucose Result: 10.0 % (06-27-23 @ 07:41)    Estimated Average Glucose: 240 mg/dL (06-27-23 @ 07:41)        Diet, Consistent Carbohydrate/No Snacks (06-26-23 @ 21:03) [Active]

## 2023-07-03 NOTE — PROGRESS NOTE ADULT - ASSESSMENT
72 y/o mostly Sami speaking M with PMH of DM, HTN, CAD s/p 3 stents. Presents with cough for the past 2 weeks. CXR with small L basilar opacity. Also noted to be in hypertensive urgency in ED.

## 2023-07-03 NOTE — PROGRESS NOTE ADULT - PROBLEM SELECTOR PLAN 3
Currently on Atorvastatin 80 mg daily  Goal LDL < 55  Continue with high intensity statin given his CAD and HFrEF

## 2023-07-04 LAB
GLUCOSE BLDC GLUCOMTR-MCNC: 107 MG/DL — HIGH (ref 70–99)
GLUCOSE BLDC GLUCOMTR-MCNC: 143 MG/DL — HIGH (ref 70–99)
GLUCOSE BLDC GLUCOMTR-MCNC: 184 MG/DL — HIGH (ref 70–99)
GLUCOSE BLDC GLUCOMTR-MCNC: 214 MG/DL — HIGH (ref 70–99)
GLUCOSE BLDC GLUCOMTR-MCNC: 81 MG/DL — SIGNIFICANT CHANGE UP (ref 70–99)

## 2023-07-04 PROCEDURE — 99233 SBSQ HOSP IP/OBS HIGH 50: CPT

## 2023-07-04 RX ADMIN — Medication 10 MILLILITER(S): at 05:41

## 2023-07-04 RX ADMIN — Medication 100 MILLIGRAM(S): at 17:51

## 2023-07-04 RX ADMIN — Medication 3 MILLILITER(S): at 12:58

## 2023-07-04 RX ADMIN — Medication 3 MILLILITER(S): at 05:47

## 2023-07-04 RX ADMIN — ATORVASTATIN CALCIUM 80 MILLIGRAM(S): 80 TABLET, FILM COATED ORAL at 22:13

## 2023-07-04 RX ADMIN — Medication 6 UNIT(S): at 09:04

## 2023-07-04 RX ADMIN — INSULIN GLARGINE 18 UNIT(S): 100 INJECTION, SOLUTION SUBCUTANEOUS at 01:13

## 2023-07-04 RX ADMIN — SACUBITRIL AND VALSARTAN 1 TABLET(S): 24; 26 TABLET, FILM COATED ORAL at 05:41

## 2023-07-04 RX ADMIN — Medication 0: at 22:13

## 2023-07-04 RX ADMIN — Medication 50 MILLIGRAM(S): at 05:41

## 2023-07-04 RX ADMIN — HEPARIN SODIUM 5000 UNIT(S): 5000 INJECTION INTRAVENOUS; SUBCUTANEOUS at 05:41

## 2023-07-04 RX ADMIN — Medication 3 MILLILITER(S): at 17:51

## 2023-07-04 RX ADMIN — Medication 3 MILLILITER(S): at 23:08

## 2023-07-04 RX ADMIN — Medication 2: at 17:51

## 2023-07-04 RX ADMIN — Medication 100 MILLIGRAM(S): at 12:57

## 2023-07-04 RX ADMIN — HEPARIN SODIUM 5000 UNIT(S): 5000 INJECTION INTRAVENOUS; SUBCUTANEOUS at 17:52

## 2023-07-04 RX ADMIN — Medication 1: at 09:06

## 2023-07-04 RX ADMIN — Medication 81 MILLIGRAM(S): at 12:57

## 2023-07-04 RX ADMIN — SACUBITRIL AND VALSARTAN 1 TABLET(S): 24; 26 TABLET, FILM COATED ORAL at 17:51

## 2023-07-04 RX ADMIN — INSULIN GLARGINE 18 UNIT(S): 100 INJECTION, SOLUTION SUBCUTANEOUS at 22:13

## 2023-07-04 RX ADMIN — Medication 100 MILLIGRAM(S): at 23:07

## 2023-07-04 RX ADMIN — Medication 6 UNIT(S): at 17:52

## 2023-07-04 NOTE — DIETITIAN INITIAL EVALUATION ADULT - REASON INDICATOR FOR ASSESSMENT
Consult received for A1C 10%  Information obtained from pt, pt's daughter in law at bedside (translated Malay), electronic medical record  Chart reviewed, events noted

## 2023-07-04 NOTE — DIETITIAN INITIAL EVALUATION ADULT - PERSON TAUGHT/METHOD
- Provided education on Carbohydrate Consistent diet including sources of carbohydrates, portion sizes, pairing protein with carbohydrates, limiting sugar sweetened beverages in diet and the importance of consistent eating pattern to help optimize glycemic control./daughter in law/verbal instruction/written material/teach back - (Patient repeats in own words)/patient instructed - Provided education on Carbohydrate Consistent diet including sources of carbohydrates, portion sizes, pairing protein with carbohydrates, limiting sugar sweetened beverages in diet and the importance of consistent eating pattern to help optimize glycemic control.  - Reviewed HF diet education. Discussed Na restriction, foods high in Na to avoid, reading food labels, tips for limiting Na in your diet. Reviewed daily weights, Wt gain parameters to contact MD. Discussed Na intake in relation to fluid retention, edema and Wt gain. Pt verbalized understanding and accepted Heart Failure Nutrition Therapy Handout.  RD remains available for diet education review./daughter in law/verbal instruction/written material/teach back - (Patient repeats in own words)/patient instructed

## 2023-07-04 NOTE — DIETITIAN INITIAL EVALUATION ADULT - LITERATURE/VIDEOS GIVEN
Carbohydrate Counting for People with Diabetes, Diabetes Label Reading Nutrition Tips, Plate Method for Diabetes  Carbohydrate Counting for People with Diabetes, Diabetes Label Reading Nutrition Tips, Plate Method for Diabetes   Heart Failure Nutrition Therapy, Heart Healthy Label Reading, Heart Healthy Shopping Tips, Lower Sodium (salt) Food List

## 2023-07-04 NOTE — PROGRESS NOTE ADULT - SUBJECTIVE AND OBJECTIVE BOX
ID: 73M with CAD s/p 3 stents, HTN, T2DM, admitted for pneumonia, cardiology consulted for ST depressions and LVH noted on EKG. ACS ruled out however echo showing down ef to 30's and segmental WMA. Received [facility] as above showing TVD. Ongoing planning for complex PCI.Patient seen and examined at bedside.    No new events    Overnight Events: NAEON, no further cp, endorsing mild cough today    Telemetry: NSR     Current Meds:  albuterol/ipratropium for Nebulization 3 milliLiter(s) Nebulizer every 6 hours  aspirin enteric coated 81 milliGRAM(s) Oral daily  atorvastatin 80 milliGRAM(s) Oral at bedtime  benzonatate 100 milliGRAM(s) Oral three times a day PRN  dextrose 5%. 1000 milliLiter(s) IV Continuous <Continuous>  dextrose 5%. 1000 milliLiter(s) IV Continuous <Continuous>  dextrose 50% Injectable 12.5 Gram(s) IV Push once  dextrose 50% Injectable 25 Gram(s) IV Push once  dextrose 50% Injectable 25 Gram(s) IV Push once  dextrose Oral Gel 15 Gram(s) Oral once PRN  glucagon  Injectable 1 milliGRAM(s) IntraMuscular once  guaiFENesin Oral Liquid (Sugar-Free) 200 milliGRAM(s) Oral every 6 hours PRN  heparin   Injectable 5000 Unit(s) SubCutaneous every 12 hours  insulin glargine Injectable (LANTUS) 18 Unit(s) SubCutaneous at bedtime  insulin lispro (ADMELOG) corrective regimen sliding scale   SubCutaneous three times a day before meals  insulin lispro (ADMELOG) corrective regimen sliding scale   SubCutaneous at bedtime  insulin lispro Injectable (ADMELOG) 6 Unit(s) SubCutaneous three times a day before meals  metoprolol succinate ER 50 milliGRAM(s) Oral daily  sacubitril 49 mG/valsartan 51 mG 1 Tablet(s) Oral two times a day      Vitals:  T(F): 97.9 (07-03), Max: 97.9 (07-02)  HR: 63 (07-03) (63 - 89)  BP: 126/73 (07-03) (126/73 - 148/86)  RR: 18 (07-03)  SpO2: 97% (07-03)  I&O's Summary    02 Jul 2023 07:01  -  03 Jul 2023 07:00  --------------------------------------------------------  IN: 1080 mL / OUT: 500 mL / NET: 580 mL        Physical Exam:  Appearance: No acute distress; well appearing  Eyes: PERRL, EOMI, pink conjunctiva  HEENT: Normal oral mucosa  Cardiovascular: RRR, S1, S2, no murmurs, rubs, or gallops; no edema; no JVD  Respiratory: Clear to auscultation bilaterally  Gastrointestinal: soft, non-tender, non-distended with normal bowel sounds  Musculoskeletal: No clubbing; no joint deformity   Neurologic: Non-focal  Lymphatic: No lymphadenopathy  Psychiatry: AAOx3, mood & affect appropriate  Skin: No rashes, ecchymoses, or cyanosis                          13.4   6.64  )-----------( 364      ( 02 Jul 2023 06:54 )             39.8     07-02    139  |  101  |  19  ----------------------------<  105<H>  3.6   |  25  |  0.85    Ca    9.2      02 Jul 2023 06:54  Phos  3.2     07-02  Mg     2.2     07-02        CARDIAC MARKERS ( 26 Jun 2023 19:47 )  22 ng/L / x     / x     / x     / x     / x      CARDIAC MARKERS ( 26 Jun 2023 12:00 )  21 ng/L / x     / x     / x     / x     / x        New ECG(s): Personally reviewed    A/P  73M with CAD s/p 3 stents, HTN, T2DM, admitted for pneumonia, cardiology consulted for ST depressions and LVH noted on EKG. ACS ruled out however echo showing down ef to 30's and segmental WMA. Received [facility] as above showing TVD. Ongoing planning for complex PCI.    #ST Depressions  #LVH  #Hypertensive urgency  #HFrEF w/ Focal WMA    Recs:  No new recs  - TTE w/ EF 30-35%. Stage B, Killips 1  - euvolemic on exam  - Continue entresto 49/51mg bid  - Continue metop succinate 50mg daily  - Will consider further uptitration of entresto/addition of MRA in coming days  - [facility] with triple vessel disease. CTS consulted w/ no role for CTS. Now planning for complex PCI, will discuss w/ Dr. Hernandez, possible cMRI for viability    > F/U CMR   - c/w Aspirin, atorva 80   - A1c 10,  HDL 43 Cholesterol 183 tgl 136. Thyroid function c/w euthyroid sick syndrome   - Management of probable bronchitis/PNA as per primary team (on CTX). CT changes (RUL GG nodules) to be repeated in 3 months    B Desi                                                                                                                                                                                                                                Forty-1 minutes spent in patient care management

## 2023-07-04 NOTE — DIETITIAN INITIAL EVALUATION ADULT - PERTINENT MEDS FT
MEDICATIONS  (STANDING):  albuterol/ipratropium for Nebulization 3 milliLiter(s) Nebulizer every 6 hours  aspirin enteric coated 81 milliGRAM(s) Oral daily  atorvastatin 80 milliGRAM(s) Oral at bedtime  dextrose 5%. 1000 milliLiter(s) (50 mL/Hr) IV Continuous <Continuous>  dextrose 5%. 1000 milliLiter(s) (100 mL/Hr) IV Continuous <Continuous>  dextrose 50% Injectable 12.5 Gram(s) IV Push once  dextrose 50% Injectable 25 Gram(s) IV Push once  dextrose 50% Injectable 25 Gram(s) IV Push once  glucagon  Injectable 1 milliGRAM(s) IntraMuscular once  guaiFENesin Oral Liquid (Sugar-Free) 100 milliGRAM(s) Oral every 6 hours  heparin   Injectable 5000 Unit(s) SubCutaneous every 12 hours  insulin glargine Injectable (LANTUS) 18 Unit(s) SubCutaneous at bedtime  insulin lispro (ADMELOG) corrective regimen sliding scale   SubCutaneous at bedtime  insulin lispro (ADMELOG) corrective regimen sliding scale   SubCutaneous three times a day before meals  insulin lispro Injectable (ADMELOG) 6 Unit(s) SubCutaneous three times a day before meals  metoprolol succinate ER 50 milliGRAM(s) Oral daily  sacubitril 49 mG/valsartan 51 mG 1 Tablet(s) Oral two times a day    MEDICATIONS  (PRN):  dextrose Oral Gel 15 Gram(s) Oral once PRN Blood Glucose LESS THAN 70 milliGRAM(s)/deciliter

## 2023-07-04 NOTE — DIETITIAN INITIAL EVALUATION ADULT - ADD RECOMMEND
- Will continue to monitor PO intake, weight, labs, skin, GI status, diet.   - Nutrition education provided to pt with written materials; pt aware RD remains available to review education/ diet as needed.   - Nutrition care plan to remain consistent with pt goals of care  - RD remains available to review diet education and adjust diet recommendations as needed.

## 2023-07-04 NOTE — PROGRESS NOTE ADULT - SUBJECTIVE AND OBJECTIVE BOX
Patient is a 73y old  Male who presents with a chief complaint of Heart failure     (04 Jul 2023 14:08)      SUBJECTIVE / OVERNIGHT EVENTS: CP free    MEDICATIONS  (STANDING):  albuterol/ipratropium for Nebulization 3 milliLiter(s) Nebulizer every 6 hours  aspirin enteric coated 81 milliGRAM(s) Oral daily  atorvastatin 80 milliGRAM(s) Oral at bedtime  dextrose 5%. 1000 milliLiter(s) (50 mL/Hr) IV Continuous <Continuous>  dextrose 5%. 1000 milliLiter(s) (100 mL/Hr) IV Continuous <Continuous>  dextrose 50% Injectable 12.5 Gram(s) IV Push once  dextrose 50% Injectable 25 Gram(s) IV Push once  dextrose 50% Injectable 25 Gram(s) IV Push once  glucagon  Injectable 1 milliGRAM(s) IntraMuscular once  guaiFENesin Oral Liquid (Sugar-Free) 100 milliGRAM(s) Oral every 6 hours  heparin   Injectable 5000 Unit(s) SubCutaneous every 12 hours  insulin glargine Injectable (LANTUS) 18 Unit(s) SubCutaneous at bedtime  insulin lispro (ADMELOG) corrective regimen sliding scale   SubCutaneous at bedtime  insulin lispro (ADMELOG) corrective regimen sliding scale   SubCutaneous three times a day before meals  insulin lispro Injectable (ADMELOG) 6 Unit(s) SubCutaneous three times a day before meals  metoprolol succinate ER 50 milliGRAM(s) Oral daily  sacubitril 49 mG/valsartan 51 mG 1 Tablet(s) Oral two times a day    MEDICATIONS  (PRN):  dextrose Oral Gel 15 Gram(s) Oral once PRN Blood Glucose LESS THAN 70 milliGRAM(s)/deciliter      Vital Signs Last 24 Hrs  T(F): 97.9 (07-04-23 @ 13:11), Max: 98.4 (07-04-23 @ 05:38)  HR: 68 (07-04-23 @ 13:11) (68 - 88)  BP: 111/68 (07-04-23 @ 13:11) (111/68 - 154/87)  RR: 18 (07-04-23 @ 13:11) (18 - 18)  SpO2: 95% (07-04-23 @ 13:11) (95% - 99%)  Telemetry:   CAPILLARY BLOOD GLUCOSE      POCT Blood Glucose.: 81 mg/dL (04 Jul 2023 11:44)  POCT Blood Glucose.: 184 mg/dL (04 Jul 2023 08:53)  POCT Blood Glucose.: 143 mg/dL (04 Jul 2023 01:12)  POCT Blood Glucose.: 90 mg/dL (03 Jul 2023 23:48)  POCT Blood Glucose.: 95 mg/dL (03 Jul 2023 21:26)  POCT Blood Glucose.: 151 mg/dL (03 Jul 2023 17:38)    I&O's Summary    03 Jul 2023 07:01  -  04 Jul 2023 07:00  --------------------------------------------------------  IN: 1260 mL / OUT: 750 mL / NET: 510 mL    04 Jul 2023 07:01  -  04 Jul 2023 14:20  --------------------------------------------------------  IN: 490 mL / OUT: 0 mL / NET: 490 mL        PHYSICAL EXAM:  GENERAL: NAD, well-developed  HEAD:  Atraumatic, Normocephalic  EYES: EOMI, PERRLA, conjunctiva and sclera clear  NECK: Supple, No JVD  CHEST/LUNG: Clear to auscultation bilaterally; No wheeze  HEART: Regular rate and rhythm; No murmurs, rubs, or gallops  ABDOMEN: Soft, Nontender, Nondistended; Bowel sounds present  EXTREMITIES:  2+ Peripheral Pulses, No clubbing, cyanosis, or edema  PSYCH: AAOx3  NEUROLOGY: non-focal  SKIN: No rashes or lesions    LABS:                    RADIOLOGY & ADDITIONAL TESTS:    Imaging Personally Reviewed:    Consultant(s) Notes Reviewed:      Care Discussed with Consultants/Other Providers:

## 2023-07-04 NOTE — DIETITIAN INITIAL EVALUATION ADULT - OTHER INFO
What Type Of Note Output Would You Prefer (Optional)?: Standard Output
How Severe Are Your Spot(S)?: mild
Hpi Title: Evaluation of Skin Lesions
Home Medications:  amLODIPine 2.5 mg oral tablet: 1 tab(s) orally once a day (2023 17:58)  aspirin 81 mg oral delayed release tablet: 1 tab(s) orally once a day (2023 17:58)  losartan 100 mg oral tablet: 1 tab(s) orally once a day (2023 17:58)  metFORMIN 850 mg oral tablet: 1 tab(s) orally 2 times a day (2023 17:58)  metoprolol 50m tablet orally once a day (2023 17:58)

## 2023-07-04 NOTE — DIETITIAN INITIAL EVALUATION ADULT - NSFNSPHYEXAMSKINFT_GEN_A_CORE
Pt states UBW ~62 kg; noted Drug Dosing Weight 59.9 (26 Jun 2023 10:45) ?3% wt loss  pt denies recent wt changes; continue to monitor wt trends   118% IBW ( pounds)  Skin: no noted pressure injuries as per flowsheets

## 2023-07-04 NOTE — DIETITIAN INITIAL EVALUATION ADULT - ORAL INTAKE PTA/DIET HISTORY
Pt endorses good appetite and PO intake PTA. Reports T2DM PTA; Reports not checking glucose levels at home and not following consistent CHO diet PTA  noted A1C with Estimated Average Glucose Result: 10.0 % (06-27-23 @ 07:41)  Confirmed no known food allergies/ food intolerances

## 2023-07-04 NOTE — DIETITIAN INITIAL EVALUATION ADULT - PERTINENT LABORATORY DATA
POCT Blood Glucose.: 81 mg/dL (07-04-23 @ 11:44)  A1C with Estimated Average Glucose Result: 10.0 % (06-27-23 @ 07:41)   POCT Blood Glucose.: 81 mg/dL (07-04-23 @ 11:44)  POCT Blood Glucose.: 184 mg/dL (07-04-23 @ 08:53)  POCT Blood Glucose.: 143 mg/dL (07-04-23 @ 01:12)  POCT Blood Glucose.: 90 mg/dL (07-03-23 @ 23:48)  POCT Blood Glucose.: 95 mg/dL (07-03-23 @ 21:26)  POCT Blood Glucose.: 151 mg/dL (07-03-23 @ 17:38)  POCT Blood Glucose.: 81 mg/dL (07-04-23 @ 11:44)    A1C with Estimated Average Glucose Result: 10.0 % (06-27-23 @ 07:41)

## 2023-07-04 NOTE — DIETITIAN INITIAL EVALUATION ADULT - NSFNSGIASSESSMENTFT_GEN_A_CORE
- Pt denies nausea, vomiting, diarrhea, or constipation at this time   - Last BM:this am per pt; not currently ordered for bowel regimen

## 2023-07-05 LAB
GLUCOSE BLDC GLUCOMTR-MCNC: 136 MG/DL — HIGH (ref 70–99)
GLUCOSE BLDC GLUCOMTR-MCNC: 148 MG/DL — HIGH (ref 70–99)
GLUCOSE BLDC GLUCOMTR-MCNC: 167 MG/DL — HIGH (ref 70–99)
GLUCOSE BLDC GLUCOMTR-MCNC: 199 MG/DL — HIGH (ref 70–99)

## 2023-07-05 PROCEDURE — 99254 IP/OBS CNSLTJ NEW/EST MOD 60: CPT

## 2023-07-05 PROCEDURE — 99232 SBSQ HOSP IP/OBS MODERATE 35: CPT

## 2023-07-05 RX ORDER — INSULIN LISPRO 100/ML
5 VIAL (ML) SUBCUTANEOUS
Refills: 0 | Status: DISCONTINUED | OUTPATIENT
Start: 2023-07-05 | End: 2023-07-17

## 2023-07-05 RX ADMIN — Medication 100 MILLIGRAM(S): at 05:19

## 2023-07-05 RX ADMIN — Medication 81 MILLIGRAM(S): at 12:50

## 2023-07-05 RX ADMIN — Medication 50 MILLIGRAM(S): at 05:20

## 2023-07-05 RX ADMIN — Medication 100 MILLIGRAM(S): at 12:49

## 2023-07-05 RX ADMIN — HEPARIN SODIUM 5000 UNIT(S): 5000 INJECTION INTRAVENOUS; SUBCUTANEOUS at 05:18

## 2023-07-05 RX ADMIN — HEPARIN SODIUM 5000 UNIT(S): 5000 INJECTION INTRAVENOUS; SUBCUTANEOUS at 17:35

## 2023-07-05 RX ADMIN — Medication 3 MILLILITER(S): at 17:33

## 2023-07-05 RX ADMIN — Medication 5 UNIT(S): at 09:34

## 2023-07-05 RX ADMIN — Medication 5 UNIT(S): at 17:31

## 2023-07-05 RX ADMIN — Medication 1: at 13:17

## 2023-07-05 RX ADMIN — Medication 5 UNIT(S): at 13:17

## 2023-07-05 RX ADMIN — Medication 100 MILLIGRAM(S): at 23:38

## 2023-07-05 RX ADMIN — ATORVASTATIN CALCIUM 80 MILLIGRAM(S): 80 TABLET, FILM COATED ORAL at 22:32

## 2023-07-05 RX ADMIN — Medication 1: at 17:31

## 2023-07-05 RX ADMIN — Medication 3 MILLILITER(S): at 05:18

## 2023-07-05 RX ADMIN — Medication 3 MILLILITER(S): at 23:39

## 2023-07-05 RX ADMIN — Medication 100 MILLIGRAM(S): at 17:32

## 2023-07-05 RX ADMIN — INSULIN GLARGINE 18 UNIT(S): 100 INJECTION, SOLUTION SUBCUTANEOUS at 22:32

## 2023-07-05 RX ADMIN — SACUBITRIL AND VALSARTAN 1 TABLET(S): 24; 26 TABLET, FILM COATED ORAL at 17:33

## 2023-07-05 RX ADMIN — SACUBITRIL AND VALSARTAN 1 TABLET(S): 24; 26 TABLET, FILM COATED ORAL at 05:20

## 2023-07-05 RX ADMIN — Medication 3 MILLILITER(S): at 12:49

## 2023-07-05 NOTE — PROGRESS NOTE ADULT - SUBJECTIVE AND OBJECTIVE BOX
Follow-up Pulm Progress Note    No new respiratory events overnight.  Denies SOB/CP.     Medications:  MEDICATIONS  (STANDING):  albuterol/ipratropium for Nebulization 3 milliLiter(s) Nebulizer every 6 hours  aspirin enteric coated 81 milliGRAM(s) Oral daily  atorvastatin 80 milliGRAM(s) Oral at bedtime  dextrose 5%. 1000 milliLiter(s) (100 mL/Hr) IV Continuous <Continuous>  dextrose 5%. 1000 milliLiter(s) (50 mL/Hr) IV Continuous <Continuous>  dextrose 50% Injectable 12.5 Gram(s) IV Push once  dextrose 50% Injectable 25 Gram(s) IV Push once  dextrose 50% Injectable 25 Gram(s) IV Push once  glucagon  Injectable 1 milliGRAM(s) IntraMuscular once  guaiFENesin Oral Liquid (Sugar-Free) 100 milliGRAM(s) Oral every 6 hours  heparin   Injectable 5000 Unit(s) SubCutaneous every 12 hours  insulin glargine Injectable (LANTUS) 18 Unit(s) SubCutaneous at bedtime  insulin lispro (ADMELOG) corrective regimen sliding scale   SubCutaneous at bedtime  insulin lispro (ADMELOG) corrective regimen sliding scale   SubCutaneous three times a day before meals  insulin lispro Injectable (ADMELOG) 5 Unit(s) SubCutaneous three times a day before meals  metoprolol succinate ER 50 milliGRAM(s) Oral daily  sacubitril 49 mG/valsartan 51 mG 1 Tablet(s) Oral two times a day    MEDICATIONS  (PRN):  dextrose Oral Gel 15 Gram(s) Oral once PRN Blood Glucose LESS THAN 70 milliGRAM(s)/deciliter          Vital Signs Last 24 Hrs  T(C): 36.6 (05 Jul 2023 13:44), Max: 37 (05 Jul 2023 01:08)  T(F): 97.9 (05 Jul 2023 13:44), Max: 98.6 (05 Jul 2023 01:08)  HR: 70 (05 Jul 2023 13:44) (65 - 86)  BP: 119/75 (05 Jul 2023 13:44) (105/65 - 133/82)  BP(mean): --  RR: 18 (05 Jul 2023 13:44) (16 - 18)  SpO2: 96% (05 Jul 2023 13:44) (96% - 97%)    Parameters below as of 05 Jul 2023 13:44  Patient On (Oxygen Delivery Method): room air              07-04 @ 07:01  -  07-05 @ 07:00  --------------------------------------------------------  IN: 1090 mL / OUT: 550 mL / NET: 540 mL          LABS:                CAPILLARY BLOOD GLUCOSE      POCT Blood Glucose.: 167 mg/dL (05 Jul 2023 13:05)                        CULTURES: (if applicable)          Physical Examination:  PULM: Clear to auscultation bilaterally, no significant sputum production  CVS: S1, S2 heard    RADIOLOGY REVIEWED  CXR:     CT chest:    TTE:

## 2023-07-05 NOTE — PROGRESS NOTE ADULT - NSPROGADDITIONALINFOA_GEN_ALL_CORE
-Plan discussed with pt/team.  Contact info: [phone] (24/7). pager [phone]  Amion on [employer]  Teams on M-T-W-F. Unavailable Thu/Weekends/Holidays  Provided face to face education as well as assessed  pt/labs/meds and discussed plan with primary team  Adjusting insulin  Discharge plan  Follow up care

## 2023-07-05 NOTE — PROGRESS NOTE ADULT - PROBLEM SELECTOR PLAN 1
acute bronchitis - CT chest with no clear PNA, few GGO/nodules  -Small L basilar opacity seen on CXR corresponds to atelectasis seen on CT chest   -Cough x 2 weeks, rhonchi L base (now improving)  -S/p Ceftriaxone x 5 days   -Continue Duoneb q6h  -Normoxic, keep sats >90% with o2 PRN.  -Continued cough, cont. Robitussin DM 10cc q6h

## 2023-07-05 NOTE — PROGRESS NOTE ADULT - SUBJECTIVE AND OBJECTIVE BOX
Patient is a 73y old  Male who presents with a chief complaint of SOB (05 Jul 2023 15:14)      SUBJECTIVE / OVERNIGHT EVENTS: awaiting cardiac intervention for occlusive CAD    MEDICATIONS  (STANDING):  albuterol/ipratropium for Nebulization 3 milliLiter(s) Nebulizer every 6 hours  aspirin enteric coated 81 milliGRAM(s) Oral daily  atorvastatin 80 milliGRAM(s) Oral at bedtime  dextrose 5%. 1000 milliLiter(s) (100 mL/Hr) IV Continuous <Continuous>  dextrose 5%. 1000 milliLiter(s) (50 mL/Hr) IV Continuous <Continuous>  dextrose 50% Injectable 12.5 Gram(s) IV Push once  dextrose 50% Injectable 25 Gram(s) IV Push once  dextrose 50% Injectable 25 Gram(s) IV Push once  glucagon  Injectable 1 milliGRAM(s) IntraMuscular once  guaiFENesin Oral Liquid (Sugar-Free) 100 milliGRAM(s) Oral every 6 hours  heparin   Injectable 5000 Unit(s) SubCutaneous every 12 hours  insulin glargine Injectable (LANTUS) 18 Unit(s) SubCutaneous at bedtime  insulin lispro (ADMELOG) corrective regimen sliding scale   SubCutaneous at bedtime  insulin lispro (ADMELOG) corrective regimen sliding scale   SubCutaneous three times a day before meals  insulin lispro Injectable (ADMELOG) 5 Unit(s) SubCutaneous three times a day before meals  metoprolol succinate ER 50 milliGRAM(s) Oral daily  sacubitril 49 mG/valsartan 51 mG 1 Tablet(s) Oral two times a day    MEDICATIONS  (PRN):  dextrose Oral Gel 15 Gram(s) Oral once PRN Blood Glucose LESS THAN 70 milliGRAM(s)/deciliter      Vital Signs Last 24 Hrs  T(F): 97.9 (07-05-23 @ 21:06), Max: 98.6 (07-05-23 @ 01:08)  HR: 77 (07-05-23 @ 21:06) (65 - 86)  BP: 117/70 (07-05-23 @ 21:06) (105/65 - 133/82)  RR: 18 (07-05-23 @ 21:06) (16 - 18)  SpO2: 95% (07-05-23 @ 21:06) (95% - 97%)  Telemetry:   CAPILLARY BLOOD GLUCOSE      POCT Blood Glucose.: 136 mg/dL (05 Jul 2023 22:09)  POCT Blood Glucose.: 199 mg/dL (05 Jul 2023 17:19)  POCT Blood Glucose.: 167 mg/dL (05 Jul 2023 13:05)  POCT Blood Glucose.: 148 mg/dL (05 Jul 2023 09:14)    I&O's Summary    04 Jul 2023 07:01  -  05 Jul 2023 07:00  --------------------------------------------------------  IN: 1090 mL / OUT: 550 mL / NET: 540 mL    05 Jul 2023 07:01  -  05 Jul 2023 22:54  --------------------------------------------------------  IN: 360 mL / OUT: 0 mL / NET: 360 mL        PHYSICAL EXAM:  GENERAL: NAD, well-developed  HEAD:  Atraumatic, Normocephalic  EYES: EOMI, PERRLA, conjunctiva and sclera clear  NECK: Supple, No JVD  CHEST/LUNG: Clear to auscultation bilaterally; No wheeze  HEART: Regular rate and rhythm; No murmurs, rubs, or gallops  ABDOMEN: Soft, Nontender, Nondistended; Bowel sounds present  EXTREMITIES:  2+ Peripheral Pulses, No clubbing, cyanosis, or edema  PSYCH: AAOx3  NEUROLOGY: non-focal  SKIN: No rashes or lesions    LABS:                    RADIOLOGY & ADDITIONAL TESTS:    Imaging Personally Reviewed:    Consultant(s) Notes Reviewed:      Care Discussed with Consultants/Other Providers:

## 2023-07-05 NOTE — PROGRESS NOTE ADULT - ASSESSMENT
72 y/o mostly Swedish speaking M with PMH of DM, HTN, CAD s/p 3 stents. Presents with cough for the past 2 weeks. CXR with small L basilar opacity. Also noted to be in hypertensive urgency in ED.

## 2023-07-05 NOTE — CONSULT NOTE ADULT - ASSESSMENT
73 y.o with a PMH of CAD s/p 3 stents, HTN, T2DM, admitted for pneumonia, cardiology consulted for ST depression and LVH noted on EKG. ACS ruled out EF down to 30s, LHC showed TVD.    Plan   TTE w/ EF 30-35%. Stage B, Killips 1  - euvolemic on exam  - Continue entresto 49/51mg bid  - Continue metop succinate 50mg daily  - Will consider further uptitration of entresto/addition of MRA in coming days  - continue Aspirin, atorva 80  - [facility] with triple vessel disease. CTS consulted w/ no role for CTS. cMRI results show viability for complex PCI due to available cardiac tissue to revascularize. Plan to schedule complex PCI   73 y.o with a PMH of CAD s/p 3 stents, HTN, T2DM, admitted for pneumonia, cardiology consulted for ST depression and LVH noted on EKG. ACS ruled out EF down to 30s, LHC showed TVD.    Plan   TTE w/ EF 30-35%. Stage B, Killips 1  - euvolemic on exam  - Continue entresto 49/51mg bid  - Continue metop succinate 50mg daily  - Will consider further uptitration of entresto/addition of MRA in coming days  - continue Aspirin, atorva 80  - LHC with triple vessel disease. CTS consulted w/ no role for CTS. cMRI results show viability for complex PCI due to available cardiac tissue to revascularize. Plan to schedule complex PCI    This patient was seen and examined personally by me and the plan was discussed with the fellow and/or resident above. Amendments were made as necessary to the above. Agree with the excellent note and plan above. 73M w CAD s/p PCI, HTN, DM2 here w pneumonia and STD on ECG. TVD on LHC.  Complex PCI planned for TVD and uptitration of GDMT for HFrEF.    Saran Elmore MD, MPhil, [facility]  Cardiologist, [facility]  ; [facility] and [facility]  Email: [email or web address] on-service contact/call information, go to [email or web address] and use [email or web address]" to login.  Outpatient Cardiology appointments, call [phone] to arrange with a colleague; I do not have outpatient Cardiology clinic.

## 2023-07-05 NOTE — CONSULT NOTE ADULT - SUBJECTIVE AND OBJECTIVE BOX
Patient seen and examined at bedside.    Overnight Events: no events, issues or complaints    Review of Systems:  REVIEW OF SYSTEMS:  CONSTITUTIONAL: No weakness, fevers or chills  EYES/ENT: No visual changes;  No dysphagia  NECK: No pain or stiffness  RESPIRATORY: No cough, wheezing, hemoptysis; No shortness of breath  CARDIOVASCULAR: No chest pain or palpitations; No lower extremity edema  GASTROINTESTINAL: No abdominal or epigastric pain. No nausea, vomiting, or hematemesis; No diarrhea or constipation. No melena or hematochezia.  BACK: No back pain  GENITOURINARY: No dysuria, frequency or hematuria  NEUROLOGICAL: No numbness or weakness  SKIN: No itching, burning, rashes, or lesions   All other review of systems is negative unless indicated above.    [ ] All other systems negative  [ ] Unable to assess ROS due to    Current Meds:  albuterol/ipratropium for Nebulization 3 milliLiter(s) Nebulizer every 6 hours  aspirin enteric coated 81 milliGRAM(s) Oral daily  atorvastatin 80 milliGRAM(s) Oral at bedtime  dextrose 5%. 1000 milliLiter(s) IV Continuous <Continuous>  dextrose 5%. 1000 milliLiter(s) IV Continuous <Continuous>  dextrose 50% Injectable 12.5 Gram(s) IV Push once  dextrose 50% Injectable 25 Gram(s) IV Push once  dextrose 50% Injectable 25 Gram(s) IV Push once  dextrose Oral Gel 15 Gram(s) Oral once PRN  glucagon  Injectable 1 milliGRAM(s) IntraMuscular once  guaiFENesin Oral Liquid (Sugar-Free) 100 milliGRAM(s) Oral every 6 hours  heparin   Injectable 5000 Unit(s) SubCutaneous every 12 hours  insulin glargine Injectable (LANTUS) 18 Unit(s) SubCutaneous at bedtime  insulin lispro (ADMELOG) corrective regimen sliding scale   SubCutaneous at bedtime  insulin lispro (ADMELOG) corrective regimen sliding scale   SubCutaneous three times a day before meals  insulin lispro Injectable (ADMELOG) 5 Unit(s) SubCutaneous three times a day before meals  metoprolol succinate ER 50 milliGRAM(s) Oral daily  sacubitril 49 mG/valsartan 51 mG 1 Tablet(s) Oral two times a day      PAST MEDICAL & SURGICAL HISTORY:  HTN (hypertension)      DM (diabetes mellitus)          Vitals:  T(F): 98 (07-05), Max: 98.6 (07-05)  HR: 65 (07-05) (65 - 86)  BP: 133/82 (07-05) (105/65 - 133/82)  RR: 16 (07-05)  SpO2: 97% (07-05)  I&O's Summary    04 Jul 2023 07:01  -  05 Jul 2023 07:00  --------------------------------------------------------  IN: 1090 mL / OUT: 550 mL / NET: 540 mL        Physical Exam:  Appearance: No acute distress; well appearing  Eyes: PERRL, EOMI, pink conjunctiva  HENT: Normal oral mucosa  Cardiovascular: RRR, S1, S2, no murmurs, rubs, or gallops; no edema; no JVD  Respiratory: Clear to auscultation bilaterally  Gastrointestinal: soft, non-tender, non-distended with normal bowel sounds  Musculoskeletal: No clubbing; no joint deformity   Neurologic: Non-focal  Lymphatic: No lymphadenopathy  Psychiatry: AAOx3, mood & affect appropriate  Skin: No rashes, ecchymoses, or cyanosis

## 2023-07-05 NOTE — PROGRESS NOTE ADULT - PROBLEM SELECTOR PLAN 1
- Test BGs ACTID and at HS  - Continue Lantus 18 units nightly   - Continue Admelog 6 units before meals, Hold if NPO or not eating  - low admelog correction scale before meals and before bedtime  -carb consistent diet  -RD consult  -hypoglycemia protocol in place  -Please educate patient for insulin pen use and     Discharge  - Will be determine based on insulin requirement, BG trends and oral intake  - Limited options for diabetes medications due to undocumented status with no insurance.   - Basal insulin + oral regimen -SGLT2 ( Farxiga or Jardiance ) given Heart failure  and Metformin  - Can apply for [facility] for insulin assistance  - if he is not eligible for [facility], not able to get insulin due to cost, then  can do Insulin 70/30  + optimized dose of Metformin   - Make sure pt has glucometer, strips and Lancets  - Follow up at endocrine clinic if medicaid approved - Test BGs ACTID and at HS  - Continue Lantus 18 units nightly   - Continue Admelog 5 units before meals, Hold if NPO or not eating  - low admelog correction scale before meals and before bedtime  -Will adjust as needed  Discharge  - Will be determine based on insulin requirement, BG trends and oral intake  - Limited options for diabetes medications due to undocumented status with no insurance.   - Basal insulin + oral regimen -SGLT2 ( Farxiga or Jardiance ) given Heart failure  and Metformin  - [facility]  - if he is not eligible for [facility], not able to get insulin due to cost, then can use coupons until pt is back in [facility]. Spoke to pt he needs to get pharmacy in [facility] that carries meds he is prescribe for.     -Pt needs basal insulin for now. Will use coupon as well at time of discharge  - Make sure pt has glucometer, strips and Lancets, insulin and insulin pens  - Follow up at medicine clinic [place]  -Needs optho and cardiac f/u as well Gabapentin Counseling: I discussed with the patient the risks of gabapentin including but not limited to dizziness, somnolence, fatigue and ataxia.

## 2023-07-05 NOTE — PROGRESS NOTE ADULT - PROBLEM SELECTOR PLAN 2
Currently on entersto and Metoprolol  BP goal <130/80 Currently on entersto and Metoprolol  BP goal <130/80  Manage per primary team

## 2023-07-05 NOTE — PROGRESS NOTE ADULT - ASSESSMENT
This is a 74 yo M /w a PMH of DM2, HTN, CAD /w 3 stents presents with SOB, found to have acute bronchitis, treated with antibiotic. Patient also found to have EF of 30% as well as an HbA1c of 10%. s/p LHC on 6/29 which showed TVD.  Patient is high risk with high level decision making due to uncontrolled diabetes which places patient at high risk for cardiovascular and cerebrovascular events. Endocrinology following for diabetes management. BG Goal 100-180mg/dl   As per pt, he was on Sitagliptin 50 mg daily and Dapagliflozin 10 mg daily until 3 month ago when he lost appointment with his doctor in [place]    A1C : 10%  Home regimen: Sitagliptin 50 mg daily, Dapagliflozin 10 mg daily. Stopped taking medication 3 month ago. Was not on metformin recently.        72 yo M w/h/o uncontrolled and untreated T2DM (A1C10%) formerly on Dapagliflozin and Sitagliptin > stopped meds for 4 months PTA because pharmacy in  "[facility]" didn't have them. Stopped Metformin due to large weight lost reported by pt. DM c/b CAD > s/p 3 stents. Also h/o HTN, presents with SOB, found to have acute bronchitis, treated with antibiotic. Patient also found to have EF of 30%, s/p LHC on 6/29 which showed TVD> awaiting PCI.  Endocrinology following for diabetes management. Tolerating POs with BG at goal while on present insulin doses. No hypoglycemia.  BG Goal 100-180mg/dl     Pt is uninsured and will need to assess what DM pt can afford. Might benefit from coupons until pt goes back to [facility].     As per pt, he was on Sitagliptin 50 mg daily and Dapagliflozin 10 mg daily until 4 months ago.     Noted pt has limited knowledge regarding DM    Met with patient and reviewed the following:  -A1c LEVEL: Present and goal  -Blood glucose goals: 100s to 150s as out pt  -Glucose monitoring frequency: At least fasting and hs  -Healthy eating and portion control. Carbs and  "My Plate" reviewed   -Dapagliflozin and Sitagliptin action, time of administration and side effects  -Importance of follow up care. Pt to f/u at medicine clinic while in [place] and then when pt goes back to [facility]  Pt able to verbalize understanding and give teach back on diet and meds plus f/u

## 2023-07-05 NOTE — PROGRESS NOTE ADULT - NUTRITIONAL ASSESSMENT
Diet, Consistent Carbohydrate/No Snacks (06-26-23 @ 21:03) [Active] Diet, Consistent Carbohydrate/No Snacks:   Low Sodium (07-04-23 @ 15:28) [Active]      Please see RD assessment and/or follow up.  Managed by primary team as well

## 2023-07-05 NOTE — PROGRESS NOTE ADULT - SUBJECTIVE AND OBJECTIVE BOX
DIABETES FOLLOW UP NOTE: Saw pt earlier today    Chief Complaint: Endocrine consult requested for management of T2DM    INTERVAL HX: Pt stable, reports tolerating POs with BG mostly at goal while on present insulin doses. No hypoglycemia. Denies any CP/SOB at time of visit      Review of Systems:  General: As above  Cardiovascular: No chest pain, palpitations  Respiratory: No SOB, no cough  GI: No nausea, vomiting, abdominal pain  Endocrine: No polyuria, polydipsia or S&Sx of hypoglycemia    Allergies    No Known Allergies    Intolerances      MEDICATIONS:  atorvastatin 80 milliGRAM(s) Oral at bedtime  insulin glargine Injectable (LANTUS) 18 Unit(s) SubCutaneous at bedtime  insulin lispro (ADMELOG) corrective regimen sliding scale   SubCutaneous three times a day before meals  insulin lispro (ADMELOG) corrective regimen sliding scale   SubCutaneous at bedtime  insulin lispro Injectable (ADMELOG) 5 Unit(s) SubCutaneous three times a day before meals      PHYSICAL EXAM:  VITALS: T(C): 36.6 (07-05-23 @ 13:44)  T(F): 97.9 (07-05-23 @ 13:44), Max: 98.6 (07-05-23 @ 01:08)  HR: 70 (07-05-23 @ 13:44) (65 - 86)  BP: 119/75 (07-05-23 @ 13:44) (105/65 - 133/82)  RR:  (16 - 18)  SpO2:  (96% - 97%)  Wt(kg): --  GENERAL: Male sitting in chair in NAD  Abdomen: Soft, nontender, non distended  Extremities: Warm, no edema in all 4 exts  NEURO: A&O X3    LABS:  POCT Blood Glucose.: 167 mg/dL (07-05-23 @ 13:05)  POCT Blood Glucose.: 148 mg/dL (07-05-23 @ 09:14)  POCT Blood Glucose.: 107 mg/dL (07-04-23 @ 21:33)  POCT Blood Glucose.: 214 mg/dL (07-04-23 @ 17:33)  POCT Blood Glucose.: 81 mg/dL (07-04-23 @ 11:44)  POCT Blood Glucose.: 184 mg/dL (07-04-23 @ 08:53)  POCT Blood Glucose.: 143 mg/dL (07-04-23 @ 01:12)  POCT Blood Glucose.: 90 mg/dL (07-03-23 @ 23:48)  POCT Blood Glucose.: 95 mg/dL (07-03-23 @ 21:26)  POCT Blood Glucose.: 151 mg/dL (07-03-23 @ 17:38)  POCT Blood Glucose.: 124 mg/dL (07-03-23 @ 11:48)  POCT Blood Glucose.: 110 mg/dL (07-03-23 @ 08:46)  POCT Blood Glucose.: 125 mg/dL (07-02-23 @ 21:36)  POCT Blood Glucose.: 178 mg/dL (07-02-23 @ 18:10)                        Thyroid Function Tests:  06-29 @ 19:11 TSH 4.73 FreeT4 -- T3 66 Anti TPO -- Anti Thyroglobulin Ab -- TSI --  06-27 @ 07:41 TSH 5.56 FreeT4 -- T3 -- Anti TPO -- Anti Thyroglobulin Ab -- TSI --      A1C with Estimated Average Glucose Result: 10.0 % (06-27-23 @ 07:41)      Estimated Average Glucose: 240 mg/dL (06-27-23 @ 07:41)        06-27 Chol 183 Direct LDL -- LDL calculated 114<H> HDL 43 Trig 136               DIABETES FOLLOW UP NOTE: Saw pt earlier today    Chief Complaint: Endocrine consult requested for management of T2DM    INTERVAL HX: Pt stable, reports tolerating POs with BG mostly at goal while on present insulin doses. No hypoglycemia. Denies any CP/SOB at time of visit      Review of Systems:  General: As above  Cardiovascular: No chest pain, palpitations  Respiratory: No SOB, no cough  GI: No nausea, vomiting, abdominal pain  Endocrine: No polyuria, polydipsia or S&Sx of hypoglycemia    Allergies    No Known Allergies    Intolerances      MEDICATIONS:  atorvastatin 80 milliGRAM(s) Oral at bedtime  insulin glargine Injectable (LANTUS) 18 Unit(s) SubCutaneous at bedtime  insulin lispro (ADMELOG) corrective regimen sliding scale   SubCutaneous three times a day before meals  insulin lispro (ADMELOG) corrective regimen sliding scale   SubCutaneous at bedtime  insulin lispro Injectable (ADMELOG) 5 Unit(s) SubCutaneous three times a day before meals      PHYSICAL EXAM:  VITALS: T(C): 36.6 (07-05-23 @ 13:44)  T(F): 97.9 (07-05-23 @ 13:44), Max: 98.6 (07-05-23 @ 01:08)  HR: 70 (07-05-23 @ 13:44) (65 - 86)  BP: 119/75 (07-05-23 @ 13:44) (105/65 - 133/82)  RR:  (16 - 18)  SpO2:  (96% - 97%)  Wt(kg): --  GENERAL: Male sitting in chair in NAD  Abdomen: Soft, nontender, non distended, central adiposity  Extremities: Warm, no edema in all 4 exts  NEURO: A&O X3    LABS:  POCT Blood Glucose.: 167 mg/dL (07-05-23 @ 13:05)  POCT Blood Glucose.: 148 mg/dL (07-05-23 @ 09:14)  POCT Blood Glucose.: 107 mg/dL (07-04-23 @ 21:33)  POCT Blood Glucose.: 214 mg/dL (07-04-23 @ 17:33)  POCT Blood Glucose.: 81 mg/dL (07-04-23 @ 11:44)  POCT Blood Glucose.: 184 mg/dL (07-04-23 @ 08:53)  POCT Blood Glucose.: 143 mg/dL (07-04-23 @ 01:12)  POCT Blood Glucose.: 90 mg/dL (07-03-23 @ 23:48)  POCT Blood Glucose.: 95 mg/dL (07-03-23 @ 21:26)  POCT Blood Glucose.: 151 mg/dL (07-03-23 @ 17:38)  POCT Blood Glucose.: 124 mg/dL (07-03-23 @ 11:48)  POCT Blood Glucose.: 110 mg/dL (07-03-23 @ 08:46)  POCT Blood Glucose.: 125 mg/dL (07-02-23 @ 21:36)  POCT Blood Glucose.: 178 mg/dL (07-02-23 @ 18:10)      Thyroid Function Tests:  06-29 @ 19:11 TSH 4.73 FreeT4 -- T3 66 Anti TPO -- Anti Thyroglobulin Ab -- TSI --  06-27 @ 07:41 TSH 5.56 FreeT4 -- T3 -- Anti TPO -- Anti Thyroglobulin Ab -- TSI --      A1C with Estimated Average Glucose Result: 10.0 % (06-27-23 @ 07:41)      Estimated Average Glucose: 240 mg/dL (06-27-23 @ 07:41)        06-27 Chol 183 Direct LDL -- LDL calculated 114<H> HDL 43 Trig 136

## 2023-07-05 NOTE — PROGRESS NOTE ADULT - PROBLEM SELECTOR PLAN 3
Currently on Atorvastatin 80 mg daily  Goal LDL < 55  Continue with high intensity statin given his CAD and HFrEF Currently on Atorvastatin 80 mg daily  Goal LDL < 55  Pt   Continue with high intensity statin given his CAD and HFrEF

## 2023-07-06 LAB
GLUCOSE BLDC GLUCOMTR-MCNC: 123 MG/DL — HIGH (ref 70–99)
GLUCOSE BLDC GLUCOMTR-MCNC: 133 MG/DL — HIGH (ref 70–99)
GLUCOSE BLDC GLUCOMTR-MCNC: 162 MG/DL — HIGH (ref 70–99)
GLUCOSE BLDC GLUCOMTR-MCNC: 169 MG/DL — HIGH (ref 70–99)

## 2023-07-06 PROCEDURE — 99232 SBSQ HOSP IP/OBS MODERATE 35: CPT

## 2023-07-06 RX ADMIN — ATORVASTATIN CALCIUM 80 MILLIGRAM(S): 80 TABLET, FILM COATED ORAL at 21:40

## 2023-07-06 RX ADMIN — HEPARIN SODIUM 5000 UNIT(S): 5000 INJECTION INTRAVENOUS; SUBCUTANEOUS at 17:33

## 2023-07-06 RX ADMIN — SACUBITRIL AND VALSARTAN 1 TABLET(S): 24; 26 TABLET, FILM COATED ORAL at 17:32

## 2023-07-06 RX ADMIN — Medication 3 MILLILITER(S): at 05:26

## 2023-07-06 RX ADMIN — Medication 5 UNIT(S): at 12:43

## 2023-07-06 RX ADMIN — Medication 100 MILLIGRAM(S): at 17:33

## 2023-07-06 RX ADMIN — Medication 100 MILLIGRAM(S): at 05:26

## 2023-07-06 RX ADMIN — Medication 3 MILLILITER(S): at 17:31

## 2023-07-06 RX ADMIN — Medication 3 MILLILITER(S): at 11:39

## 2023-07-06 RX ADMIN — Medication 5 UNIT(S): at 17:31

## 2023-07-06 RX ADMIN — Medication 5 UNIT(S): at 09:01

## 2023-07-06 RX ADMIN — INSULIN GLARGINE 18 UNIT(S): 100 INJECTION, SOLUTION SUBCUTANEOUS at 21:40

## 2023-07-06 RX ADMIN — SACUBITRIL AND VALSARTAN 1 TABLET(S): 24; 26 TABLET, FILM COATED ORAL at 05:25

## 2023-07-06 RX ADMIN — Medication 100 MILLIGRAM(S): at 23:19

## 2023-07-06 RX ADMIN — Medication 100 MILLIGRAM(S): at 11:39

## 2023-07-06 RX ADMIN — HEPARIN SODIUM 5000 UNIT(S): 5000 INJECTION INTRAVENOUS; SUBCUTANEOUS at 05:28

## 2023-07-06 RX ADMIN — Medication 81 MILLIGRAM(S): at 11:39

## 2023-07-06 RX ADMIN — Medication 50 MILLIGRAM(S): at 05:25

## 2023-07-06 RX ADMIN — Medication 3 MILLILITER(S): at 23:19

## 2023-07-06 RX ADMIN — Medication 1: at 12:43

## 2023-07-06 NOTE — PROGRESS NOTE ADULT - NUTRITIONAL ASSESSMENT
Diet, Consistent Carbohydrate/No Snacks:   Low Sodium (07-04-23 @ 15:28) [Active]      Please see RD assessment and/or follow up.  Managed by primary team as well

## 2023-07-06 NOTE — PROGRESS NOTE ADULT - ASSESSMENT
74 yo M w/h/o uncontrolled and untreated T2DM (A1C10%) formerly on Dapagliflozin and Sitagliptin > stopped meds for 4 months PTA because pharmacy in  "[facility]" didn't have them. Stopped Metformin due to large weight lost reported by pt. DM c/b CAD > s/p 3 stents. Also h/o HTN, presents with SOB, found to have acute bronchitis, treated with antibiotic. Patient also found to have EF of 30%, s/p LHC on 6/29 which showed TVD> awaiting PCI.  Endocrinology following for diabetes management. Tolerating POs with BG mostly at goal while on present insulin doses. No hypoglycemia.  BG Goal 100-180mg/dl     Pt is uninsured and will need to assess what DM pt can afford. Might benefit from coupons until pt goes back to [facility].     As per pt, he was on Sitagliptin 50 mg daily and Dapagliflozin 10 mg daily until 4 months ago.     Noted pt has limited knowledge regarding DM

## 2023-07-06 NOTE — PROGRESS NOTE ADULT - SUBJECTIVE AND OBJECTIVE BOX
Follow-up Pulm Progress Note    No new respiratory events overnight.  Denies SOB/CP.   cough resolved     Medications:  MEDICATIONS  (STANDING):  albuterol/ipratropium for Nebulization 3 milliLiter(s) Nebulizer every 6 hours  aspirin enteric coated 81 milliGRAM(s) Oral daily  atorvastatin 80 milliGRAM(s) Oral at bedtime  dextrose 5%. 1000 milliLiter(s) (50 mL/Hr) IV Continuous <Continuous>  dextrose 5%. 1000 milliLiter(s) (100 mL/Hr) IV Continuous <Continuous>  dextrose 50% Injectable 12.5 Gram(s) IV Push once  dextrose 50% Injectable 25 Gram(s) IV Push once  dextrose 50% Injectable 25 Gram(s) IV Push once  glucagon  Injectable 1 milliGRAM(s) IntraMuscular once  guaiFENesin Oral Liquid (Sugar-Free) 100 milliGRAM(s) Oral every 6 hours  heparin   Injectable 5000 Unit(s) SubCutaneous every 12 hours  insulin glargine Injectable (LANTUS) 18 Unit(s) SubCutaneous at bedtime  insulin lispro (ADMELOG) corrective regimen sliding scale   SubCutaneous three times a day before meals  insulin lispro (ADMELOG) corrective regimen sliding scale   SubCutaneous at bedtime  insulin lispro Injectable (ADMELOG) 5 Unit(s) SubCutaneous three times a day before meals  metoprolol succinate ER 50 milliGRAM(s) Oral daily  sacubitril 49 mG/valsartan 51 mG 1 Tablet(s) Oral two times a day    MEDICATIONS  (PRN):  dextrose Oral Gel 15 Gram(s) Oral once PRN Blood Glucose LESS THAN 70 milliGRAM(s)/deciliter          Vital Signs Last 24 Hrs  T(C): 36.8 (06 Jul 2023 05:11), Max: 36.8 (06 Jul 2023 05:11)  T(F): 98.2 (06 Jul 2023 05:11), Max: 98.2 (06 Jul 2023 05:11)  HR: 69 (06 Jul 2023 05:11) (65 - 79)  BP: 122/73 (06 Jul 2023 05:11) (111/69 - 133/82)  BP(mean): --  RR: 18 (06 Jul 2023 05:11) (16 - 18)  SpO2: 98% (06 Jul 2023 05:11) (95% - 98%)    Parameters below as of 06 Jul 2023 05:11  Patient On (Oxygen Delivery Method): room air              07-05 @ 07:01  -  07-06 @ 07:00  --------------------------------------------------------  IN: 640 mL / OUT: 0 mL / NET: 640 mL              CAPILLARY BLOOD GLUCOSE      POCT Blood Glucose.: 133 mg/dL (06 Jul 2023 08:47)                Physical Examination:  PULM: CTA bilaterally   CVS: S1, S2 heard        RADIOLOGY REVIEWED  CXR 7/3: grossly clear     CT chest: < from: CT Chest No Cont (06.27.23 @ 11:01) >  FINDINGS:    LYMPH NODES: No lymphadenopathy.    HEART/VASCULATURE: The heart is normal in size. Aortic and coronary   artery calcifications. No pericardial effusion. Bilateral partially   calcified pleural plaques compatible with asbestos exposure.    AIRWAYS/LUNGS/PLEURA: The central airways are patent. Bilateral lower   lobe dependent linear opacities. Right upper lobe subsolid nodule   measures 6 mm (3-47). Right upper lobe perifissural ground glass nodule   measures 5 mm (7-144, 3-60). Mild peribronchovascular groundglass in the   lateral segment of the right middle lobe. No pleural effusion or   pneumothorax.    UPPER ABDOMEN: Unremarkable.    BONES/SOFT TISSUES: Mild degenerative changes.    IMPRESSION:    Bilateral lower lobe dependent atelectasis.    Mild right middle lobe groundglass and small right upper lobe ground   glass nodules which maybe inflammatory. Recommend CT chest in 3 months   to see if they persist.    < end of copied text >      TTE: < from: TTE W or WO Ultrasound Enhancing Agent (06.27.23 @ 14:05) >  CONCLUSIONS:      1. Normal left ventricular cavity size. The left ventricular wall thickness is normal. The left ventricular systolic function is severely decreased with an ejection fraction visually estimated at 30 to 35 %. There are regional wall motion abnormalities No evidence of a thrombus in the left ventricle.   2. Multiple segmental abnormalities exist. See findings.   3. There is mild (grade 1) left ventricular diastolic dysfunction, with normal filling pressure.   4. Normal right ventricular cavity size, normal right ventricular wall thickness and normal right ventricular systolic function. The tricuspid annular plane systolic excursion (TAPSE) is 2.1 cm (normal >=1.7 cm).   5. The right atrium is normal.   6. No pericardial effusion seen.   7. No prior echocardiogram is available for comparison.   8. Symmetric mitral valve leaflet tethering.   9. There is normal LV mass and normal geometry.    ________________________________________________________________________________________  FINDINGS:     Left Ventricle:  Normal left ventricular cavity size. The left ventricular wall thickness is normal. The left ventricular systolic function is severely decreased with an ejection fraction visually estimated at 30 to 35%. There are regional wall motion abnormalities consistent with ischemic heart disease. There is mild (grade 1) left ventricular diastolic dysfunction, with normal filling pressure. There is normal LV mass and normal geometry. There is no evidence of a thrombus in the left ventricle.  LV Wall Scoring: The apex is akinetic. The mid and apical anterior septum, mid  inferolateral segment, apical lateral segment, apical anterior segment, and  basal inferior segment are hypokinetic. All remaining scored segments are  normal.          Right Ventricle:  Normal right ventricular cavity size, normal wall thickness and normal right ventricular systolic function. Tricuspid annular plane systolic excursion (TAPSE) is 2.1 cm (normal >=1.7 cm).     Left Atrium:  The left atrium is normal.     Right Atrium:  The right atrium is normal.     Aortic Valve:  The aortic valve is tricuspid with normal structure without stenosis. There is no evidence of aortic regurgitation.     Mitral Valve:  Structurally normal mitral valve with normal leaflet opening and closing, without any evidence of mitral stenosis or significant regurgitation. There is symmetric leaflet tethering. There is trace mitral regurgitation.    Tricuspid Valve:  Structurally normal tricuspid valve with normal leaflet excursion. There is trace tricuspid regurgitation.     Pulmonic Valve:  Structurally normal pulmonic valve with normal leaflet excursion. There is trace pulmonic regurgitation.     Aorta:  The aortic annulus and aortic root appear normal in size. Normal aorta sinus of Valsalva, measuring 3.20 cm (indexed 2.05 cm/m²).     Pericardium:  No pericardial effusion seen.  ____________________________________________________________________  Quantitative Data:  Left Ventricle Measurements: (Indexed to BSA)     IVSd (2D):   0.9 cm  LVPWd (2D):  0.9 cm  LVIDd (2D):  4.9 cm  LVIDs (2D):  4.1 cm  LV Mass:     151 g  96.9 g/m²  Visualized LV EF%: 30 to 35%     MV E Vmax:    0.57 m/s  MV A Vmax:    1.16 m/s  MV E/A:       0.49  e' lateral:   5.98 cm/s  e' medial:    4.13 cm/s  E/e' lateral: 9.55  E/e' medial:  13.83  E/e' Average: 11.30    Aorta Measurements:     Ao Sinus:      3.2 cm  Ao Root:       3.2 cm  Ao Root s, 2D: 3.2 cm       Left Atrium Measurements: (Indexed to BSA)  LA Diam 2D: 3.40 cm    Right Ventricle Measurements:     TAPSE: 2.1 cm       LVOT / RVOT/ Qp/Qs Data: (Indexed to BSA)  Mitral Valve Measurements:     MV E Vmax: 0.6 m/s  MV A Vmax: 1.2 m/s  MV E/A:   0.5       --------------------------------------------------------------------------------  Tom[employer]ec:  LV Analysis:  EF: 25 %      < end of copied text >

## 2023-07-06 NOTE — PROGRESS NOTE ADULT - PROBLEM SELECTOR PLAN 3
Currently on Atorvastatin 80 mg daily  Goal LDL < 55  Pt   Continue with high intensity statin given his CAD and HFrEF      Contact via Microsoft Teams during business hours  To reach covering provider access [employer] via sunrise tools  For Urgent matters/after-hours/weekends/holidays please page endocrine fellow on call   For nonurgent matters please email [email or web address]    Please note that this patient may be followed by different provider tomorrow.  Notify endocrine 24 hours prior to discharge for final recommendations

## 2023-07-06 NOTE — PROGRESS NOTE ADULT - PROBLEM SELECTOR PLAN 1
- Test BGs ACTID and at HS  - Continue Lantus 18 units nightly   - Continue Admelog 5 units before meals, Hold if NPO or not eating; consider increasing lunch admelog if patient continues to snack between lunch/dinner  - low admelog correction scale before meals and before bedtime  Discharge  - Will be determine based on insulin requirement, BG trends and oral intake  - Limited options for diabetes medications due to undocumented status with no insurance.   - Basal insulin + oral regimen -SGLT2 ( Farxiga or Jardiance ) given Heart failure  and Metformin  - Can apply for [facility] for insulin assistance  - if he is not eligible for [facility], not able to get insulin due to cost, then can use coupons until pt is back in [facility]. Spoke to pt he needs to get pharmacy in [facility] that carries meds he is prescribe for.     -Pt needs basal insulin for now. Will use coupon as well at time of discharge  - Make sure pt has glucometer, strips and Lancets, insulin and insulin pens  - Follow up at medicine clinic [place]  -Needs optho and cardiac f/u as well

## 2023-07-06 NOTE — PROGRESS NOTE ADULT - SUBJECTIVE AND OBJECTIVE BOX
seen earlier today     Chief Complaint: Diabetes Mellitus follow up    INTERVAL HX:  [id number], patient awaiting pci, patient tolerating po, bg mostly at goal, endorses having cookies as snack between lunch and dinner yesterday does not plan to snack moving forward       Review of Systems:  General: As above  GI: No nausea, vomiting  Endocrine: no  S&Sx of hypoglycemia    Allergies    No Known Allergies    Intolerances      MEDICATIONS  (STANDING):  albuterol/ipratropium for Nebulization 3 milliLiter(s) Nebulizer every 6 hours  aspirin enteric coated 81 milliGRAM(s) Oral daily  atorvastatin 80 milliGRAM(s) Oral at bedtime  dextrose 5%. 1000 milliLiter(s) (50 mL/Hr) IV Continuous <Continuous>  dextrose 5%. 1000 milliLiter(s) (100 mL/Hr) IV Continuous <Continuous>  dextrose 50% Injectable 12.5 Gram(s) IV Push once  dextrose 50% Injectable 25 Gram(s) IV Push once  dextrose 50% Injectable 25 Gram(s) IV Push once  glucagon  Injectable 1 milliGRAM(s) IntraMuscular once  guaiFENesin Oral Liquid (Sugar-Free) 100 milliGRAM(s) Oral every 6 hours  heparin   Injectable 5000 Unit(s) SubCutaneous every 12 hours  insulin glargine Injectable (LANTUS) 18 Unit(s) SubCutaneous at bedtime  insulin lispro (ADMELOG) corrective regimen sliding scale   SubCutaneous at bedtime  insulin lispro (ADMELOG) corrective regimen sliding scale   SubCutaneous three times a day before meals  insulin lispro Injectable (ADMELOG) 5 Unit(s) SubCutaneous three times a day before meals  metoprolol succinate ER 50 milliGRAM(s) Oral daily  sacubitril 49 mG/valsartan 51 mG 1 Tablet(s) Oral two times a day      atorvastatin   80 milliGRAM(s) Oral (07-05-23 @ 22:32)    insulin glargine Injectable (LANTUS)   18 Unit(s) SubCutaneous (07-05-23 @ 22:32)    insulin lispro (ADMELOG) corrective regimen sliding scale   1 Unit(s) SubCutaneous (07-06-23 @ 12:43)   1 Unit(s) SubCutaneous (07-05-23 @ 17:31)    insulin lispro Injectable (ADMELOG)   5 Unit(s) SubCutaneous (07-06-23 @ 12:43)   5 Unit(s) SubCutaneous (07-06-23 @ 09:01)   5 Unit(s) SubCutaneous (07-05-23 @ 17:31)        PHYSICAL EXAM:  VITALS: T(C): 36.8 (07-06-23 @ 13:04)  T(F): 98.3 (07-06-23 @ 13:04), Max: 98.3 (07-06-23 @ 13:04)  HR: 75 (07-06-23 @ 13:04) (68 - 79)  BP: 107/67 (07-06-23 @ 13:04) (107/67 - 122/73)  RR:  (18 - 18)  SpO2:  (94% - 98%)  Wt(kg): --  GENERAL: male laying in bed in NAD  Respiratory: Respirations unlabored   Extremities: Warm, no edema  NEURO: Alert , appropriate     LABS:  POCT Blood Glucose.: 162 mg/dL (07-06-23 @ 12:42)  POCT Blood Glucose.: 133 mg/dL (07-06-23 @ 08:47)  POCT Blood Glucose.: 136 mg/dL (07-05-23 @ 22:09)  POCT Blood Glucose.: 199 mg/dL (07-05-23 @ 17:19)  POCT Blood Glucose.: 167 mg/dL (07-05-23 @ 13:05)  POCT Blood Glucose.: 148 mg/dL (07-05-23 @ 09:14)  POCT Blood Glucose.: 107 mg/dL (07-04-23 @ 21:33)  POCT Blood Glucose.: 214 mg/dL (07-04-23 @ 17:33)  POCT Blood Glucose.: 81 mg/dL (07-04-23 @ 11:44)  POCT Blood Glucose.: 184 mg/dL (07-04-23 @ 08:53)  POCT Blood Glucose.: 143 mg/dL (07-04-23 @ 01:12)  POCT Blood Glucose.: 90 mg/dL (07-03-23 @ 23:48)  POCT Blood Glucose.: 95 mg/dL (07-03-23 @ 21:26)  POCT Blood Glucose.: 151 mg/dL (07-03-23 @ 17:38)              06-27 Chol 183 Direct LDL -- LDL calculated 114<H> HDL 43 Trig 136  Thyroid Function Tests:  06-29 @ 19:11 TSH 4.73 FreeT4 -- T3 66 Anti TPO -- Anti Thyroglobulin Ab -- TSI --  06-27 @ 07:41 TSH 5.56 FreeT4 -- T3 -- Anti TPO -- Anti Thyroglobulin Ab -- TSI --      A1C with Estimated Average Glucose Result: 10.0 % (06-27-23 @ 07:41)    Estimated Average Glucose: 240 mg/dL (06-27-23 @ 07:41)        Diet, Consistent Carbohydrate/No Snacks:   Low Sodium (07-04-23 @ 15:28) [Active]

## 2023-07-06 NOTE — PROGRESS NOTE ADULT - PROBLEM SELECTOR PLAN 1
acute bronchitis - CT chest with no clear PNA, few GGO/nodules  -Small L basilar opacity seen on CXR corresponds to atelectasis seen on CT chest   -Cough x 2 weeks, rhonchi L base (now improving)  -S/p Ceftriaxone x 5 days   -Continue Duoneb q6h  -Normoxic, keep sats >90% with o2 PRN.  -Cough resolved. Change Robitussin to PRN.

## 2023-07-06 NOTE — PROGRESS NOTE ADULT - SUBJECTIVE AND OBJECTIVE BOX
Patient is a 73y old  Male who presents with a chief complaint of SOB (05 Jul 2023 15:14)      SUBJECTIVE / OVERNIGHT EVENTS: CP free    MEDICATIONS  (STANDING):  albuterol/ipratropium for Nebulization 3 milliLiter(s) Nebulizer every 6 hours  aspirin enteric coated 81 milliGRAM(s) Oral daily  atorvastatin 80 milliGRAM(s) Oral at bedtime  dextrose 5%. 1000 milliLiter(s) (50 mL/Hr) IV Continuous <Continuous>  dextrose 5%. 1000 milliLiter(s) (100 mL/Hr) IV Continuous <Continuous>  dextrose 50% Injectable 12.5 Gram(s) IV Push once  dextrose 50% Injectable 25 Gram(s) IV Push once  dextrose 50% Injectable 25 Gram(s) IV Push once  glucagon  Injectable 1 milliGRAM(s) IntraMuscular once  guaiFENesin Oral Liquid (Sugar-Free) 100 milliGRAM(s) Oral every 6 hours  heparin   Injectable 5000 Unit(s) SubCutaneous every 12 hours  insulin glargine Injectable (LANTUS) 18 Unit(s) SubCutaneous at bedtime  insulin lispro (ADMELOG) corrective regimen sliding scale   SubCutaneous at bedtime  insulin lispro (ADMELOG) corrective regimen sliding scale   SubCutaneous three times a day before meals  insulin lispro Injectable (ADMELOG) 5 Unit(s) SubCutaneous three times a day before meals  metoprolol succinate ER 50 milliGRAM(s) Oral daily  sacubitril 49 mG/valsartan 51 mG 1 Tablet(s) Oral two times a day    MEDICATIONS  (PRN):  dextrose Oral Gel 15 Gram(s) Oral once PRN Blood Glucose LESS THAN 70 milliGRAM(s)/deciliter      Vital Signs Last 24 Hrs  T(F): 98.4 (07-06-23 @ 21:51), Max: 98.4 (07-06-23 @ 21:51)  HR: 69 (07-06-23 @ 21:51) (63 - 79)  BP: 122/69 (07-06-23 @ 21:51) (107/67 - 129/70)  RR: 18 (07-06-23 @ 21:51) (18 - 18)  SpO2: 95% (07-06-23 @ 21:51) (94% - 99%)  Telemetry:   CAPILLARY BLOOD GLUCOSE      POCT Blood Glucose.: 169 mg/dL (06 Jul 2023 21:24)  POCT Blood Glucose.: 123 mg/dL (06 Jul 2023 17:27)  POCT Blood Glucose.: 162 mg/dL (06 Jul 2023 12:42)  POCT Blood Glucose.: 133 mg/dL (06 Jul 2023 08:47)    I&O's Summary    05 Jul 2023 07:01  -  06 Jul 2023 07:00  --------------------------------------------------------  IN: 640 mL / OUT: 0 mL / NET: 640 mL    06 Jul 2023 07:01  -  06 Jul 2023 23:20  --------------------------------------------------------  IN: 720 mL / OUT: 0 mL / NET: 720 mL        PHYSICAL EXAM:  GENERAL: NAD, well-developed  HEAD:  Atraumatic, Normocephalic  EYES: EOMI, PERRLA, conjunctiva and sclera clear  NECK: Supple, No JVD  CHEST/LUNG: Clear to auscultation bilaterally; No wheeze  HEART: Regular rate and rhythm; No murmurs, rubs, or gallops  ABDOMEN: Soft, Nontender, Nondistended; Bowel sounds present  EXTREMITIES:  2+ Peripheral Pulses, No clubbing, cyanosis, or edema  PSYCH: AAOx3  NEUROLOGY: non-focal  SKIN: No rashes or lesions    LABS:                    RADIOLOGY & ADDITIONAL TESTS:    Imaging Personally Reviewed:    Consultant(s) Notes Reviewed:      Care Discussed with Consultants/Other Providers:

## 2023-07-06 NOTE — PROGRESS NOTE ADULT - ASSESSMENT
72 y/o mostly Tajik speaking M with PMH of DM, HTN, CAD s/p 3 stents. Presents with cough for the past 2 weeks. CXR with small L basilar opacity. Also noted to be in hypertensive urgency in ED.

## 2023-07-07 LAB
GLUCOSE BLDC GLUCOMTR-MCNC: 113 MG/DL — HIGH (ref 70–99)
GLUCOSE BLDC GLUCOMTR-MCNC: 131 MG/DL — HIGH (ref 70–99)
GLUCOSE BLDC GLUCOMTR-MCNC: 149 MG/DL — HIGH (ref 70–99)
GLUCOSE BLDC GLUCOMTR-MCNC: 163 MG/DL — HIGH (ref 70–99)

## 2023-07-07 RX ADMIN — INSULIN GLARGINE 18 UNIT(S): 100 INJECTION, SOLUTION SUBCUTANEOUS at 21:52

## 2023-07-07 RX ADMIN — Medication 50 MILLIGRAM(S): at 05:34

## 2023-07-07 RX ADMIN — Medication 3 MILLILITER(S): at 17:49

## 2023-07-07 RX ADMIN — SACUBITRIL AND VALSARTAN 1 TABLET(S): 24; 26 TABLET, FILM COATED ORAL at 05:34

## 2023-07-07 RX ADMIN — HEPARIN SODIUM 5000 UNIT(S): 5000 INJECTION INTRAVENOUS; SUBCUTANEOUS at 17:49

## 2023-07-07 RX ADMIN — Medication 100 MILLIGRAM(S): at 05:34

## 2023-07-07 RX ADMIN — Medication 3 MILLILITER(S): at 11:45

## 2023-07-07 RX ADMIN — Medication 5 UNIT(S): at 17:48

## 2023-07-07 RX ADMIN — Medication 81 MILLIGRAM(S): at 11:46

## 2023-07-07 RX ADMIN — SACUBITRIL AND VALSARTAN 1 TABLET(S): 24; 26 TABLET, FILM COATED ORAL at 17:49

## 2023-07-07 RX ADMIN — Medication 5 UNIT(S): at 11:44

## 2023-07-07 RX ADMIN — Medication 3 MILLILITER(S): at 05:34

## 2023-07-07 RX ADMIN — HEPARIN SODIUM 5000 UNIT(S): 5000 INJECTION INTRAVENOUS; SUBCUTANEOUS at 05:34

## 2023-07-07 RX ADMIN — Medication 100 MILLIGRAM(S): at 23:03

## 2023-07-07 RX ADMIN — Medication 3 MILLILITER(S): at 23:03

## 2023-07-07 RX ADMIN — ATORVASTATIN CALCIUM 80 MILLIGRAM(S): 80 TABLET, FILM COATED ORAL at 21:52

## 2023-07-07 RX ADMIN — Medication 5 UNIT(S): at 08:55

## 2023-07-07 NOTE — PROGRESS NOTE ADULT - SUBJECTIVE AND OBJECTIVE BOX
Follow-up Pulm Progress Note    No new respiratory events overnight.  Denies SOB/CP.   cough resolved     Medications:  MEDICATIONS  (STANDING):  albuterol/ipratropium for Nebulization 3 milliLiter(s) Nebulizer every 6 hours  aspirin enteric coated 81 milliGRAM(s) Oral daily  atorvastatin 80 milliGRAM(s) Oral at bedtime  dextrose 5%. 1000 milliLiter(s) (100 mL/Hr) IV Continuous <Continuous>  dextrose 5%. 1000 milliLiter(s) (50 mL/Hr) IV Continuous <Continuous>  dextrose 50% Injectable 25 Gram(s) IV Push once  dextrose 50% Injectable 25 Gram(s) IV Push once  dextrose 50% Injectable 12.5 Gram(s) IV Push once  glucagon  Injectable 1 milliGRAM(s) IntraMuscular once  guaiFENesin Oral Liquid (Sugar-Free) 100 milliGRAM(s) Oral every 6 hours  heparin   Injectable 5000 Unit(s) SubCutaneous every 12 hours  insulin glargine Injectable (LANTUS) 18 Unit(s) SubCutaneous at bedtime  insulin lispro (ADMELOG) corrective regimen sliding scale   SubCutaneous three times a day before meals  insulin lispro (ADMELOG) corrective regimen sliding scale   SubCutaneous at bedtime  insulin lispro Injectable (ADMELOG) 5 Unit(s) SubCutaneous three times a day before meals  metoprolol succinate ER 50 milliGRAM(s) Oral daily  sacubitril 49 mG/valsartan 51 mG 1 Tablet(s) Oral two times a day    MEDICATIONS  (PRN):  dextrose Oral Gel 15 Gram(s) Oral once PRN Blood Glucose LESS THAN 70 milliGRAM(s)/deciliter          Vital Signs Last 24 Hrs  T(C): 36.7 (07 Jul 2023 09:00), Max: 36.9 (06 Jul 2023 21:51)  T(F): 98 (07 Jul 2023 09:00), Max: 98.4 (06 Jul 2023 21:51)  HR: 66 (07 Jul 2023 09:00) (63 - 75)  BP: 115/81 (07 Jul 2023 09:00) (107/67 - 129/70)  BP(mean): --  RR: 18 (07 Jul 2023 09:00) (18 - 18)  SpO2: 97% (07 Jul 2023 09:00) (94% - 99%)    Parameters below as of 07 Jul 2023 09:00  Patient On (Oxygen Delivery Method): room air              07-06 @ 07:01  -  07-07 @ 07:00  --------------------------------------------------------  IN: 1200 mL / OUT: 0 mL / NET: 1200 mL                  CAPILLARY BLOOD GLUCOSE      POCT Blood Glucose.: 113 mg/dL (07 Jul 2023 08:52)            Physical Examination:  PULM: CTA bilaterally   CVS: S1, S2 heard        RADIOLOGY REVIEWED  CXR 7/3: grossly clear     CT chest: < from: CT Chest No Cont (06.27.23 @ 11:01) >  FINDINGS:    LYMPH NODES: No lymphadenopathy.    HEART/VASCULATURE: The heart is normal in size. Aortic and coronary   artery calcifications. No pericardial effusion. Bilateral partially   calcified pleural plaques compatible with asbestos exposure.    AIRWAYS/LUNGS/PLEURA: The central airways are patent. Bilateral lower   lobe dependent linear opacities. Right upper lobe subsolid nodule   measures 6 mm (3-47). Right upper lobe perifissural ground glass nodule   measures 5 mm (7-144, 3-60). Mild peribronchovascular groundglass in the   lateral segment of the right middle lobe. No pleural effusion or   pneumothorax.    UPPER ABDOMEN: Unremarkable.    BONES/SOFT TISSUES: Mild degenerative changes.    IMPRESSION:    Bilateral lower lobe dependent atelectasis.    Mild right middle lobe groundglass and small right upper lobe ground   glass nodules which maybe inflammatory. Recommend CT chest in 3 months   to see if they persist.    < end of copied text >      TTE: < from: TTE W or WO Ultrasound Enhancing Agent (06.27.23 @ 14:05) >  CONCLUSIONS:      1. Normal left ventricular cavity size. The left ventricular wall thickness is normal. The left ventricular systolic function is severely decreased with an ejection fraction visually estimated at 30 to 35 %. There are regional wall motion abnormalities No evidence of a thrombus in the left ventricle.   2. Multiple segmental abnormalities exist. See findings.   3. There is mild (grade 1) left ventricular diastolic dysfunction, with normal filling pressure.   4. Normal right ventricular cavity size, normal right ventricular wall thickness and normal right ventricular systolic function. The tricuspid annular plane systolic excursion (TAPSE) is 2.1 cm (normal >=1.7 cm).   5. The right atrium is normal.   6. No pericardial effusion seen.   7. No prior echocardiogram is available for comparison.   8. Symmetric mitral valve leaflet tethering.   9. There is normal LV mass and normal geometry.    ________________________________________________________________________________________  FINDINGS:     Left Ventricle:  Normal left ventricular cavity size. The left ventricular wall thickness is normal. The left ventricular systolic function is severely decreased with an ejection fraction visually estimated at 30 to 35%. There are regional wall motion abnormalities consistent with ischemic heart disease. There is mild (grade 1) left ventricular diastolic dysfunction, with normal filling pressure. There is normal LV mass and normal geometry. There is no evidence of a thrombus in the left ventricle.  LV Wall Scoring: The apex is akinetic. The mid and apical anterior septum, mid  inferolateral segment, apical lateral segment, apical anterior segment, and  basal inferior segment are hypokinetic. All remaining scored segments are  normal.          Right Ventricle:  Normal right ventricular cavity size, normal wall thickness and normal right ventricular systolic function. Tricuspid annular plane systolic excursion (TAPSE) is 2.1 cm (normal >=1.7 cm).     Left Atrium:  The left atrium is normal.     Right Atrium:  The right atrium is normal.     Aortic Valve:  The aortic valve is tricuspid with normal structure without stenosis. There is no evidence of aortic regurgitation.     Mitral Valve:  Structurally normal mitral valve with normal leaflet opening and closing, without any evidence of mitral stenosis or significant regurgitation. There is symmetric leaflet tethering. There is trace mitral regurgitation.    Tricuspid Valve:  Structurally normal tricuspid valve with normal leaflet excursion. There is trace tricuspid regurgitation.     Pulmonic Valve:  Structurally normal pulmonic valve with normal leaflet excursion. There is trace pulmonic regurgitation.     Aorta:  The aortic annulus and aortic root appear normal in size. Normal aorta sinus of Valsalva, measuring 3.20 cm (indexed 2.05 cm/m²).     Pericardium:  No pericardial effusion seen.  ____________________________________________________________________  Quantitative Data:  Left Ventricle Measurements: (Indexed to BSA)     IVSd (2D):   0.9 cm  LVPWd (2D):  0.9 cm  LVIDd (2D):  4.9 cm  LVIDs (2D):  4.1 cm  LV Mass:     151 g  96.9 g/m²  Visualized LV EF%: 30 to 35%     MV E Vmax:    0.57 m/s  MV A Vmax:    1.16 m/s  MV E/A:       0.49  e' lateral:   5.98 cm/s  e' medial:    4.13 cm/s  E/e' lateral: 9.55  E/e' medial:  13.83  E/e' Average: 11.30    Aorta Measurements:     Ao Sinus:      3.2 cm  Ao Root:       3.2 cm  Ao Root s, 2D: 3.2 cm       Left Atrium Measurements: (Indexed to BSA)  LA Diam 2D: 3.40 cm    Right Ventricle Measurements:     TAPSE: 2.1 cm       LVOT / RVOT/ Qp/Qs Data: (Indexed to BSA)  Mitral Valve Measurements:     MV E Vmax: 0.6 m/s  MV A Vmax: 1.2 m/s  MV E/A:   0.5       --------------------------------------------------------------------------------  [employer]:  LV Analysis:  EF: 25 %      < end of copied text >

## 2023-07-07 NOTE — PROGRESS NOTE ADULT - SUBJECTIVE AND OBJECTIVE BOX
Patient is a 73y old  Male who presents with a chief complaint of SOB (05 Jul 2023 15:14)      SUBJECTIVE / OVERNIGHT EVENTS: awaiting complex PCI    MEDICATIONS  (STANDING):  albuterol/ipratropium for Nebulization 3 milliLiter(s) Nebulizer every 6 hours  aspirin enteric coated 81 milliGRAM(s) Oral daily  atorvastatin 80 milliGRAM(s) Oral at bedtime  dextrose 5%. 1000 milliLiter(s) (50 mL/Hr) IV Continuous <Continuous>  dextrose 5%. 1000 milliLiter(s) (100 mL/Hr) IV Continuous <Continuous>  dextrose 50% Injectable 12.5 Gram(s) IV Push once  dextrose 50% Injectable 25 Gram(s) IV Push once  dextrose 50% Injectable 25 Gram(s) IV Push once  glucagon  Injectable 1 milliGRAM(s) IntraMuscular once  guaiFENesin Oral Liquid (Sugar-Free) 100 milliGRAM(s) Oral every 6 hours  heparin   Injectable 5000 Unit(s) SubCutaneous every 12 hours  insulin glargine Injectable (LANTUS) 18 Unit(s) SubCutaneous at bedtime  insulin lispro (ADMELOG) corrective regimen sliding scale   SubCutaneous at bedtime  insulin lispro (ADMELOG) corrective regimen sliding scale   SubCutaneous three times a day before meals  insulin lispro Injectable (ADMELOG) 5 Unit(s) SubCutaneous three times a day before meals  metoprolol succinate ER 50 milliGRAM(s) Oral daily  sacubitril 49 mG/valsartan 51 mG 1 Tablet(s) Oral two times a day    MEDICATIONS  (PRN):  dextrose Oral Gel 15 Gram(s) Oral once PRN Blood Glucose LESS THAN 70 milliGRAM(s)/deciliter      Vital Signs Last 24 Hrs  T(F): 98 (07-07-23 @ 16:17), Max: 98.4 (07-06-23 @ 21:51)  HR: 69 (07-07-23 @ 16:17) (63 - 75)  BP: 125/70 (07-07-23 @ 16:17) (103/66 - 129/70)  RR: 18 (07-07-23 @ 16:17) (18 - 18)  SpO2: 97% (07-07-23 @ 16:17) (94% - 99%)  Telemetry:   CAPILLARY BLOOD GLUCOSE      POCT Blood Glucose.: 131 mg/dL (07 Jul 2023 11:35)  POCT Blood Glucose.: 113 mg/dL (07 Jul 2023 08:52)  POCT Blood Glucose.: 169 mg/dL (06 Jul 2023 21:24)    I&O's Summary    06 Jul 2023 07:01  -  07 Jul 2023 07:00  --------------------------------------------------------  IN: 1200 mL / OUT: 0 mL / NET: 1200 mL    07 Jul 2023 07:01  -  07 Jul 2023 17:37  --------------------------------------------------------  IN: 400 mL / OUT: 0 mL / NET: 400 mL        PHYSICAL EXAM:  GENERAL: NAD, well-developed  HEAD:  Atraumatic, Normocephalic  EYES: EOMI, PERRLA, conjunctiva and sclera clear  NECK: Supple, No JVD  CHEST/LUNG: Clear to auscultation bilaterally; No wheeze  HEART: Regular rate and rhythm; No murmurs, rubs, or gallops  ABDOMEN: Soft, Nontender, Nondistended; Bowel sounds present  EXTREMITIES:  2+ Peripheral Pulses, No clubbing, cyanosis, or edema  PSYCH: AAOx3  NEUROLOGY: non-focal  SKIN: No rashes or lesions    LABS:                    RADIOLOGY & ADDITIONAL TESTS:    Imaging Personally Reviewed:    Consultant(s) Notes Reviewed:      Care Discussed with Consultants/Other Providers:

## 2023-07-07 NOTE — PROGRESS NOTE ADULT - ASSESSMENT
72 y/o mostly French speaking M with PMH of DM, HTN, CAD s/p 3 stents. Presents with cough for the past 2 weeks. CXR with small L basilar opacity. Also noted to be in hypertensive urgency in ED.

## 2023-07-07 NOTE — PROGRESS NOTE ADULT - PROBLEM SELECTOR PLAN 1
acute bronchitis - CT chest with no clear PNA, few GGO/nodules  -Small L basilar opacity seen on CXR corresponds to atelectasis seen on CT chest   -Cough x 2 weeks, rhonchi L base (now improving)  -S/p Ceftriaxone x 5 days   -Continue Duoneb q6h  -Normoxic, keep sats >90% with o2 PRN.  -Cough resolved. Change Robitussin PRN.

## 2023-07-08 LAB
ALBUMIN SERPL ELPH-MCNC: 3.6 G/DL — SIGNIFICANT CHANGE UP (ref 3.3–5)
ALP SERPL-CCNC: 99 U/L — SIGNIFICANT CHANGE UP (ref 40–120)
ALT FLD-CCNC: 51 U/L — HIGH (ref 10–45)
ANION GAP SERPL CALC-SCNC: 8 MMOL/L — SIGNIFICANT CHANGE UP (ref 5–17)
AST SERPL-CCNC: 31 U/L — SIGNIFICANT CHANGE UP (ref 10–40)
BILIRUB SERPL-MCNC: 0.4 MG/DL — SIGNIFICANT CHANGE UP (ref 0.2–1.2)
BUN SERPL-MCNC: 21 MG/DL — SIGNIFICANT CHANGE UP (ref 7–23)
CALCIUM SERPL-MCNC: 9.3 MG/DL — SIGNIFICANT CHANGE UP (ref 8.4–10.5)
CHLORIDE SERPL-SCNC: 102 MMOL/L — SIGNIFICANT CHANGE UP (ref 96–108)
CO2 SERPL-SCNC: 26 MMOL/L — SIGNIFICANT CHANGE UP (ref 22–31)
CREAT SERPL-MCNC: 0.89 MG/DL — SIGNIFICANT CHANGE UP (ref 0.5–1.3)
EGFR: 89 ML/MIN/1.73M2 — SIGNIFICANT CHANGE UP
GLUCOSE BLDC GLUCOMTR-MCNC: 107 MG/DL — HIGH (ref 70–99)
GLUCOSE BLDC GLUCOMTR-MCNC: 120 MG/DL — HIGH (ref 70–99)
GLUCOSE BLDC GLUCOMTR-MCNC: 150 MG/DL — HIGH (ref 70–99)
GLUCOSE BLDC GLUCOMTR-MCNC: 176 MG/DL — HIGH (ref 70–99)
GLUCOSE SERPL-MCNC: 156 MG/DL — HIGH (ref 70–99)
HCT VFR BLD CALC: 41.1 % — SIGNIFICANT CHANGE UP (ref 39–50)
HGB BLD-MCNC: 13.4 G/DL — SIGNIFICANT CHANGE UP (ref 13–17)
MCHC RBC-ENTMCNC: 30.2 PG — SIGNIFICANT CHANGE UP (ref 27–34)
MCHC RBC-ENTMCNC: 32.6 GM/DL — SIGNIFICANT CHANGE UP (ref 32–36)
MCV RBC AUTO: 92.8 FL — SIGNIFICANT CHANGE UP (ref 80–100)
NRBC # BLD: 0 /100 WBCS — SIGNIFICANT CHANGE UP (ref 0–0)
PLATELET # BLD AUTO: 297 K/UL — SIGNIFICANT CHANGE UP (ref 150–400)
POTASSIUM SERPL-MCNC: 4.6 MMOL/L — SIGNIFICANT CHANGE UP (ref 3.5–5.3)
POTASSIUM SERPL-SCNC: 4.6 MMOL/L — SIGNIFICANT CHANGE UP (ref 3.5–5.3)
PROT SERPL-MCNC: 6.8 G/DL — SIGNIFICANT CHANGE UP (ref 6–8.3)
RBC # BLD: 4.43 M/UL — SIGNIFICANT CHANGE UP (ref 4.2–5.8)
RBC # FLD: 13.5 % — SIGNIFICANT CHANGE UP (ref 10.3–14.5)
SODIUM SERPL-SCNC: 136 MMOL/L — SIGNIFICANT CHANGE UP (ref 135–145)
WBC # BLD: 6 K/UL — SIGNIFICANT CHANGE UP (ref 3.8–10.5)
WBC # FLD AUTO: 6 K/UL — SIGNIFICANT CHANGE UP (ref 3.8–10.5)

## 2023-07-08 PROCEDURE — 93306 TTE W/DOPPLER COMPLETE: CPT | Mod: 26

## 2023-07-08 RX ADMIN — Medication 100 MILLIGRAM(S): at 12:51

## 2023-07-08 RX ADMIN — Medication 100 MILLIGRAM(S): at 05:11

## 2023-07-08 RX ADMIN — Medication 50 MILLIGRAM(S): at 05:11

## 2023-07-08 RX ADMIN — Medication 100 MILLIGRAM(S): at 23:18

## 2023-07-08 RX ADMIN — Medication 81 MILLIGRAM(S): at 12:52

## 2023-07-08 RX ADMIN — Medication 100 MILLIGRAM(S): at 17:39

## 2023-07-08 RX ADMIN — SACUBITRIL AND VALSARTAN 1 TABLET(S): 24; 26 TABLET, FILM COATED ORAL at 17:39

## 2023-07-08 RX ADMIN — Medication 3 MILLILITER(S): at 05:10

## 2023-07-08 RX ADMIN — HEPARIN SODIUM 5000 UNIT(S): 5000 INJECTION INTRAVENOUS; SUBCUTANEOUS at 05:10

## 2023-07-08 RX ADMIN — Medication 3 MILLILITER(S): at 17:37

## 2023-07-08 RX ADMIN — Medication 3 MILLILITER(S): at 23:18

## 2023-07-08 RX ADMIN — HEPARIN SODIUM 5000 UNIT(S): 5000 INJECTION INTRAVENOUS; SUBCUTANEOUS at 17:39

## 2023-07-08 RX ADMIN — INSULIN GLARGINE 18 UNIT(S): 100 INJECTION, SOLUTION SUBCUTANEOUS at 22:45

## 2023-07-08 RX ADMIN — ATORVASTATIN CALCIUM 80 MILLIGRAM(S): 80 TABLET, FILM COATED ORAL at 22:45

## 2023-07-08 RX ADMIN — Medication 5 UNIT(S): at 17:37

## 2023-07-08 RX ADMIN — Medication 5 UNIT(S): at 12:52

## 2023-07-08 RX ADMIN — Medication 5 UNIT(S): at 08:50

## 2023-07-08 RX ADMIN — Medication 3 MILLILITER(S): at 12:51

## 2023-07-08 RX ADMIN — SACUBITRIL AND VALSARTAN 1 TABLET(S): 24; 26 TABLET, FILM COATED ORAL at 05:10

## 2023-07-08 NOTE — PROGRESS NOTE ADULT - SUBJECTIVE AND OBJECTIVE BOX
DATE OF SERVICE: 07-08-23 @ 15:37  CHIEF COMPLAINT:Patient is a 73y old  Male who presents with a chief complaint of SOB (05 Jul 2023 15:14)    	        PAST MEDICAL & SURGICAL HISTORY:  HTN (hypertension)      DM (diabetes mellitus)                RESPIRATORY: No cough, wheezing, chills or hemoptysis; No Shortness of Breath  CARDIOVASCULAR: No chest pain, palpitations, passing out, dizziness, or leg swelling  GASTROINTESTINAL: No abdominal or epigastric pain. No nausea, vomiting, or hematemesis; No diarrhea or constipation. No melena or hematochezia.  GENITOURINARY: No dysuria, frequency, hematuria, or incontinence  NEUROLOGICAL: No headaches    Medications:  MEDICATIONS  (STANDING):  albuterol/ipratropium for Nebulization 3 milliLiter(s) Nebulizer every 6 hours  aspirin enteric coated 81 milliGRAM(s) Oral daily  atorvastatin 80 milliGRAM(s) Oral at bedtime  dextrose 5%. 1000 milliLiter(s) (100 mL/Hr) IV Continuous <Continuous>  dextrose 5%. 1000 milliLiter(s) (50 mL/Hr) IV Continuous <Continuous>  dextrose 50% Injectable 12.5 Gram(s) IV Push once  dextrose 50% Injectable 25 Gram(s) IV Push once  dextrose 50% Injectable 25 Gram(s) IV Push once  glucagon  Injectable 1 milliGRAM(s) IntraMuscular once  guaiFENesin Oral Liquid (Sugar-Free) 100 milliGRAM(s) Oral every 6 hours  heparin   Injectable 5000 Unit(s) SubCutaneous every 12 hours  insulin glargine Injectable (LANTUS) 18 Unit(s) SubCutaneous at bedtime  insulin lispro (ADMELOG) corrective regimen sliding scale   SubCutaneous at bedtime  insulin lispro (ADMELOG) corrective regimen sliding scale   SubCutaneous three times a day before meals  insulin lispro Injectable (ADMELOG) 5 Unit(s) SubCutaneous three times a day before meals  metoprolol succinate ER 50 milliGRAM(s) Oral daily  sacubitril 49 mG/valsartan 51 mG 1 Tablet(s) Oral two times a day    MEDICATIONS  (PRN):  dextrose Oral Gel 15 Gram(s) Oral once PRN Blood Glucose LESS THAN 70 milliGRAM(s)/deciliter    	    PHYSICAL EXAM:  T(C): 36.9 (07-08-23 @ 13:02), Max: 36.9 (07-08-23 @ 13:02)  HR: 77 (07-08-23 @ 13:02) (61 - 77)  BP: 117/72 (07-08-23 @ 13:02) (112/70 - 151/86)  RR: 18 (07-08-23 @ 13:02) (18 - 18)  SpO2: 97% (07-08-23 @ 13:02) (96% - 98%)  Wt(kg): --  I&O's Summary    07 Jul 2023 07:01  -  08 Jul 2023 07:00  --------------------------------------------------------  IN: 1420 mL / OUT: 0 mL / NET: 1420 mL          HEENT:   Normal oral mucosa,   Cardiovascular: Normal S1 S2, No JVD,  Respiratory: Lungs clear to auscultation	  Psychiatry: A & O   Gastrointestinal:  Soft, Non-tender, + BS	    Neurologic: Non-focal  Extremities: Normal range of motion,     TELEMETRY: 	    ECG:  	  RADIOLOGY:  OTHER: 	  	  LABS:	 	    CARDIAC MARKERS:                                13.4   6.00  )-----------( 297      ( 08 Jul 2023 08:25 )             41.1     07-08    136  |  102  |  21  ----------------------------<  156<H>  4.6   |  26  |  0.89    Ca    9.3      08 Jul 2023 08:25    TPro  6.8  /  Alb  3.6  /  TBili  0.4  /  DBili  x   /  AST  31  /  ALT  51<H>  /  AlkPhos  99  07-08    proBNP:   Lipid Profile:   HgA1c:   TSH:

## 2023-07-08 NOTE — PROGRESS NOTE ADULT - ASSESSMENT
73M with CAD s/p 3 stents, HTN, T2DM, admitted for pneumonia, cardiology consulted for ST depressions and LVH noted on EKG.      ST Depressions  Hypertensive urgency resolved    -F/u  TTE noted  cath noted  triple vessel   cts sx eval reviewed recommends complex PCI    c/w meds    likely pna  bronchitis  s/p abs  nebs  pulm eval  noted  cards f/u for pci   dm  fsg riss  dvt proph

## 2023-07-09 LAB
ANION GAP SERPL CALC-SCNC: 10 MMOL/L — SIGNIFICANT CHANGE UP (ref 5–17)
BUN SERPL-MCNC: 20 MG/DL — SIGNIFICANT CHANGE UP (ref 7–23)
CALCIUM SERPL-MCNC: 9.6 MG/DL — SIGNIFICANT CHANGE UP (ref 8.4–10.5)
CHLORIDE SERPL-SCNC: 101 MMOL/L — SIGNIFICANT CHANGE UP (ref 96–108)
CO2 SERPL-SCNC: 25 MMOL/L — SIGNIFICANT CHANGE UP (ref 22–31)
CREAT SERPL-MCNC: 0.81 MG/DL — SIGNIFICANT CHANGE UP (ref 0.5–1.3)
EGFR: 92 ML/MIN/1.73M2 — SIGNIFICANT CHANGE UP
GLUCOSE BLDC GLUCOMTR-MCNC: 120 MG/DL — HIGH (ref 70–99)
GLUCOSE BLDC GLUCOMTR-MCNC: 133 MG/DL — HIGH (ref 70–99)
GLUCOSE BLDC GLUCOMTR-MCNC: 139 MG/DL — HIGH (ref 70–99)
GLUCOSE BLDC GLUCOMTR-MCNC: 150 MG/DL — HIGH (ref 70–99)
GLUCOSE SERPL-MCNC: 129 MG/DL — HIGH (ref 70–99)
HCT VFR BLD CALC: 44.5 % — SIGNIFICANT CHANGE UP (ref 39–50)
HGB BLD-MCNC: 14.4 G/DL — SIGNIFICANT CHANGE UP (ref 13–17)
MAGNESIUM SERPL-MCNC: 2.1 MG/DL — SIGNIFICANT CHANGE UP (ref 1.6–2.6)
MCHC RBC-ENTMCNC: 30.1 PG — SIGNIFICANT CHANGE UP (ref 27–34)
MCHC RBC-ENTMCNC: 32.4 GM/DL — SIGNIFICANT CHANGE UP (ref 32–36)
MCV RBC AUTO: 93.1 FL — SIGNIFICANT CHANGE UP (ref 80–100)
NRBC # BLD: 0 /100 WBCS — SIGNIFICANT CHANGE UP (ref 0–0)
PHOSPHATE SERPL-MCNC: 3 MG/DL — SIGNIFICANT CHANGE UP (ref 2.5–4.5)
PLATELET # BLD AUTO: 301 K/UL — SIGNIFICANT CHANGE UP (ref 150–400)
POTASSIUM SERPL-MCNC: 4.7 MMOL/L — SIGNIFICANT CHANGE UP (ref 3.5–5.3)
POTASSIUM SERPL-SCNC: 4.7 MMOL/L — SIGNIFICANT CHANGE UP (ref 3.5–5.3)
RBC # BLD: 4.78 M/UL — SIGNIFICANT CHANGE UP (ref 4.2–5.8)
RBC # FLD: 13.3 % — SIGNIFICANT CHANGE UP (ref 10.3–14.5)
SODIUM SERPL-SCNC: 136 MMOL/L — SIGNIFICANT CHANGE UP (ref 135–145)
WBC # BLD: 7.35 K/UL — SIGNIFICANT CHANGE UP (ref 3.8–10.5)
WBC # FLD AUTO: 7.35 K/UL — SIGNIFICANT CHANGE UP (ref 3.8–10.5)

## 2023-07-09 RX ADMIN — Medication 5 UNIT(S): at 13:13

## 2023-07-09 RX ADMIN — HEPARIN SODIUM 5000 UNIT(S): 5000 INJECTION INTRAVENOUS; SUBCUTANEOUS at 06:34

## 2023-07-09 RX ADMIN — Medication 50 MILLIGRAM(S): at 06:33

## 2023-07-09 RX ADMIN — Medication 100 MILLIGRAM(S): at 22:53

## 2023-07-09 RX ADMIN — Medication 81 MILLIGRAM(S): at 13:12

## 2023-07-09 RX ADMIN — Medication 3 MILLILITER(S): at 13:12

## 2023-07-09 RX ADMIN — Medication 5 UNIT(S): at 17:54

## 2023-07-09 RX ADMIN — INSULIN GLARGINE 18 UNIT(S): 100 INJECTION, SOLUTION SUBCUTANEOUS at 22:51

## 2023-07-09 RX ADMIN — ATORVASTATIN CALCIUM 80 MILLIGRAM(S): 80 TABLET, FILM COATED ORAL at 21:27

## 2023-07-09 RX ADMIN — SACUBITRIL AND VALSARTAN 1 TABLET(S): 24; 26 TABLET, FILM COATED ORAL at 06:34

## 2023-07-09 RX ADMIN — Medication 3 MILLILITER(S): at 06:34

## 2023-07-09 RX ADMIN — Medication 3 MILLILITER(S): at 22:53

## 2023-07-09 RX ADMIN — SACUBITRIL AND VALSARTAN 1 TABLET(S): 24; 26 TABLET, FILM COATED ORAL at 17:24

## 2023-07-09 RX ADMIN — Medication 100 MILLIGRAM(S): at 06:34

## 2023-07-09 RX ADMIN — Medication 100 MILLIGRAM(S): at 17:24

## 2023-07-09 RX ADMIN — Medication 5 UNIT(S): at 08:41

## 2023-07-09 RX ADMIN — HEPARIN SODIUM 5000 UNIT(S): 5000 INJECTION INTRAVENOUS; SUBCUTANEOUS at 17:24

## 2023-07-09 RX ADMIN — Medication 3 MILLILITER(S): at 17:24

## 2023-07-09 RX ADMIN — Medication 100 MILLIGRAM(S): at 13:12

## 2023-07-09 NOTE — PROGRESS NOTE ADULT - SUBJECTIVE AND OBJECTIVE BOX
DATE OF SERVICE: 07-09-23 @ 08:43  CHIEF COMPLAINT:Patient is a 73y old  Male who presents with a chief complaint of SOB (05 Jul 2023 15:14)    	        PAST MEDICAL & SURGICAL HISTORY:  HTN (hypertension)      DM (diabetes mellitus)              REVIEW OF SYSTEMS:      RESPIRATORY: No cough, wheezing, chills or hemoptysis; No Shortness of Breath  CARDIOVASCULAR: No chest pain, palpitations, passing out, dizziness, or leg swelling  GASTROINTESTINAL: No abdominal or epigastric pain. No nausea, vomiting, or hematemesis; No diarrhea or constipation.   GENITOURINARY: No dysuria, frequency, hematuria, or incontinence  NEUROLOGICAL: No headaches,   MUSCULOSKELETAL: No joint pain or swelling; No muscle, back, or extremity pain    Medications:  MEDICATIONS  (STANDING):  albuterol/ipratropium for Nebulization 3 milliLiter(s) Nebulizer every 6 hours  aspirin enteric coated 81 milliGRAM(s) Oral daily  atorvastatin 80 milliGRAM(s) Oral at bedtime  dextrose 5%. 1000 milliLiter(s) (50 mL/Hr) IV Continuous <Continuous>  dextrose 5%. 1000 milliLiter(s) (100 mL/Hr) IV Continuous <Continuous>  dextrose 50% Injectable 25 Gram(s) IV Push once  dextrose 50% Injectable 25 Gram(s) IV Push once  dextrose 50% Injectable 12.5 Gram(s) IV Push once  glucagon  Injectable 1 milliGRAM(s) IntraMuscular once  guaiFENesin Oral Liquid (Sugar-Free) 100 milliGRAM(s) Oral every 6 hours  heparin   Injectable 5000 Unit(s) SubCutaneous every 12 hours  insulin glargine Injectable (LANTUS) 18 Unit(s) SubCutaneous at bedtime  insulin lispro (ADMELOG) corrective regimen sliding scale   SubCutaneous at bedtime  insulin lispro (ADMELOG) corrective regimen sliding scale   SubCutaneous three times a day before meals  insulin lispro Injectable (ADMELOG) 5 Unit(s) SubCutaneous three times a day before meals  metoprolol succinate ER 50 milliGRAM(s) Oral daily  sacubitril 49 mG/valsartan 51 mG 1 Tablet(s) Oral two times a day    MEDICATIONS  (PRN):  dextrose Oral Gel 15 Gram(s) Oral once PRN Blood Glucose LESS THAN 70 milliGRAM(s)/deciliter    	    PHYSICAL EXAM:  T(C): 36.2 (07-09-23 @ 06:30), Max: 36.9 (07-08-23 @ 13:02)  HR: 60 (07-09-23 @ 06:30) (60 - 77)  BP: 126/72 (07-09-23 @ 06:30) (117/72 - 142/74)  RR: 18 (07-09-23 @ 06:30) (18 - 18)  SpO2: 96% (07-09-23 @ 06:30) (95% - 97%)  Wt(kg): --  I&O's Summary    08 Jul 2023 07:01  -  09 Jul 2023 07:00  --------------------------------------------------------  IN: 900 mL / OUT: 250 mL / NET: 650 mL        Appearance: Normal	  HEENT:   Normal oral mucosa,   Cardiovascular: Normal S1 S2, No JVD,  Respiratory: Lungs clear to auscultation	  Psychiatry: A & O  Gastrointestinal:  Soft, Non-tender, + BS	    Neurologic: Non-focal  Extremities: Normal range of motion, No clubbing, cyanosis or edema  Vascular: Peripheral pulses palpable 2+ bilaterally    TELEMETRY: 	    ECG:  	  RADIOLOGY:  OTHER: 	  	  LABS:	 	    CARDIAC MARKERS:                                13.4   6.00  )-----------( 297      ( 08 Jul 2023 08:25 )             41.1     07-08    136  |  102  |  21  ----------------------------<  156<H>  4.6   |  26  |  0.89    Ca    9.3      08 Jul 2023 08:25    TPro  6.8  /  Alb  3.6  /  TBili  0.4  /  DBili  x   /  AST  31  /  ALT  51<H>  /  AlkPhos  99  07-08    proBNP:   Lipid Profile:   HgA1c:   TSH:

## 2023-07-10 ENCOUNTER — TRANSCRIPTION ENCOUNTER (OUTPATIENT)
Age: 73
End: 2023-07-10

## 2023-07-10 LAB
GLUCOSE BLDC GLUCOMTR-MCNC: 131 MG/DL — HIGH (ref 70–99)
GLUCOSE BLDC GLUCOMTR-MCNC: 143 MG/DL — HIGH (ref 70–99)
GLUCOSE BLDC GLUCOMTR-MCNC: 155 MG/DL — HIGH (ref 70–99)
GLUCOSE BLDC GLUCOMTR-MCNC: 160 MG/DL — HIGH (ref 70–99)
GLUCOSE BLDC GLUCOMTR-MCNC: 166 MG/DL — HIGH (ref 70–99)

## 2023-07-10 PROCEDURE — 99152 MOD SED SAME PHYS/QHP 5/>YRS: CPT

## 2023-07-10 PROCEDURE — 93010 ELECTROCARDIOGRAM REPORT: CPT

## 2023-07-10 PROCEDURE — 33990 INSJ PERQ VAD L HRT ARTERIAL: CPT

## 2023-07-10 PROCEDURE — 99233 SBSQ HOSP IP/OBS HIGH 50: CPT

## 2023-07-10 PROCEDURE — 92928 PRQ TCAT PLMT NTRAC ST 1 LES: CPT | Mod: LC

## 2023-07-10 RX ORDER — CLOPIDOGREL BISULFATE 75 MG/1
75 TABLET, FILM COATED ORAL DAILY
Refills: 0 | Status: DISCONTINUED | OUTPATIENT
Start: 2023-07-11 | End: 2023-07-17

## 2023-07-10 RX ORDER — CLOPIDOGREL BISULFATE 75 MG/1
600 TABLET, FILM COATED ORAL ONCE
Refills: 0 | Status: COMPLETED | OUTPATIENT
Start: 2023-07-10 | End: 2023-07-10

## 2023-07-10 RX ORDER — SACUBITRIL AND VALSARTAN 24; 26 MG/1; MG/1
1 TABLET, FILM COATED ORAL
Qty: 60 | Refills: 0
Start: 2023-07-10 | End: 2023-08-08

## 2023-07-10 RX ADMIN — Medication 3 MILLILITER(S): at 06:50

## 2023-07-10 RX ADMIN — Medication 100 MILLIGRAM(S): at 06:49

## 2023-07-10 RX ADMIN — Medication 3 MILLILITER(S): at 15:31

## 2023-07-10 RX ADMIN — SACUBITRIL AND VALSARTAN 1 TABLET(S): 24; 26 TABLET, FILM COATED ORAL at 06:49

## 2023-07-10 RX ADMIN — HEPARIN SODIUM 5000 UNIT(S): 5000 INJECTION INTRAVENOUS; SUBCUTANEOUS at 06:49

## 2023-07-10 RX ADMIN — Medication 3 MILLILITER(S): at 17:58

## 2023-07-10 RX ADMIN — Medication 5 UNIT(S): at 09:40

## 2023-07-10 RX ADMIN — ATORVASTATIN CALCIUM 80 MILLIGRAM(S): 80 TABLET, FILM COATED ORAL at 22:34

## 2023-07-10 RX ADMIN — INSULIN GLARGINE 18 UNIT(S): 100 INJECTION, SOLUTION SUBCUTANEOUS at 22:35

## 2023-07-10 RX ADMIN — Medication 81 MILLIGRAM(S): at 14:07

## 2023-07-10 RX ADMIN — Medication 50 MILLIGRAM(S): at 15:31

## 2023-07-10 RX ADMIN — Medication 5 UNIT(S): at 15:32

## 2023-07-10 RX ADMIN — Medication 1: at 17:59

## 2023-07-10 RX ADMIN — CLOPIDOGREL BISULFATE 600 MILLIGRAM(S): 75 TABLET, FILM COATED ORAL at 14:07

## 2023-07-10 RX ADMIN — Medication 5 UNIT(S): at 17:59

## 2023-07-10 RX ADMIN — SACUBITRIL AND VALSARTAN 1 TABLET(S): 24; 26 TABLET, FILM COATED ORAL at 17:59

## 2023-07-10 NOTE — PROGRESS NOTE ADULT - SUBJECTIVE AND OBJECTIVE BOX
DATE OF SERVICE: 07-10-23 @ 11:12  CHIEF COMPLAINT:Patient is a 73y old  Male who presents with a chief complaint of SOB (05 Jul 2023 15:14)    	        PAST MEDICAL & SURGICAL HISTORY:  HTN (hypertension)      DM (diabetes mellitus)              REVIEW OF SYSTEMS:    RESPIRATORY: No cough, wheezing, chills or hemoptysis; No Shortness of Breath  CARDIOVASCULAR: No chest pain, palpitations, passing out, dizziness, or leg swelling  GASTROINTESTINAL: No abdominal or epigastric pain. No nausea, vomiting, or hematemesis; No diarrhea or constipation. No melena or hematochezia.  GENITOURINARY: No dysuria, frequency, hematuria, or incontinence  NEUROLOGICAL: No headaches,     Medications:  MEDICATIONS  (STANDING):  albuterol/ipratropium for Nebulization 3 milliLiter(s) Nebulizer every 6 hours  aspirin enteric coated 81 milliGRAM(s) Oral daily  atorvastatin 80 milliGRAM(s) Oral at bedtime  dextrose 5%. 1000 milliLiter(s) (50 mL/Hr) IV Continuous <Continuous>  dextrose 5%. 1000 milliLiter(s) (100 mL/Hr) IV Continuous <Continuous>  dextrose 50% Injectable 12.5 Gram(s) IV Push once  dextrose 50% Injectable 25 Gram(s) IV Push once  dextrose 50% Injectable 25 Gram(s) IV Push once  glucagon  Injectable 1 milliGRAM(s) IntraMuscular once  heparin   Injectable 5000 Unit(s) SubCutaneous every 12 hours  insulin glargine Injectable (LANTUS) 18 Unit(s) SubCutaneous at bedtime  insulin lispro (ADMELOG) corrective regimen sliding scale   SubCutaneous at bedtime  insulin lispro (ADMELOG) corrective regimen sliding scale   SubCutaneous three times a day before meals  insulin lispro Injectable (ADMELOG) 5 Unit(s) SubCutaneous three times a day before meals  metoprolol succinate ER 50 milliGRAM(s) Oral daily  sacubitril 49 mG/valsartan 51 mG 1 Tablet(s) Oral two times a day    MEDICATIONS  (PRN):  dextrose Oral Gel 15 Gram(s) Oral once PRN Blood Glucose LESS THAN 70 milliGRAM(s)/deciliter  guaiFENesin Oral Liquid (Sugar-Free) 100 milliGRAM(s) Oral every 6 hours PRN Cough    	    PHYSICAL EXAM:  T(C): 36.4 (07-10-23 @ 06:37), Max: 36.8 (07-10-23 @ 01:05)  HR: 56 (07-10-23 @ 06:37) (56 - 82)  BP: 149/72 (07-10-23 @ 06:37) (120/74 - 149/72)  RR: 18 (07-10-23 @ 06:37) (18 - 18)  SpO2: 96% (07-10-23 @ 06:37) (96% - 98%)  Wt(kg): --  I&O's Summary    09 Jul 2023 07:01  -  10 Jul 2023 07:00  --------------------------------------------------------  IN: 960 mL / OUT: 0 mL / NET: 960 mL          Cardiovascular: Normal S1 S2, No JVD, No murmurs, No edema  Respiratory: Lungs clear to auscultation	  Psychiatry: A & O 	  Skin: No rashes, No ecchymoses, No cyanosis	  Neurologic: Non-focal  Extremities: Normal range of motion,    TELEMETRY: 	    ECG:  	  RADIOLOGY:  OTHER: 	  	  LABS:	 	    CARDIAC MARKERS:                                14.4   7.35  )-----------( 301      ( 09 Jul 2023 09:41 )             44.5     07-09    136  |  101  |  20  ----------------------------<  129<H>  4.7   |  25  |  0.81    Ca    9.6      09 Jul 2023 09:41  Phos  3.0     07-09  Mg     2.1     07-09      proBNP:   Lipid Profile:   HgA1c:   TSH:

## 2023-07-10 NOTE — DISCHARGE NOTE PROVIDER - CARE PROVIDER_API CALL
Wolfgang Elmore.  Cardiovascular Disease  [address] [place] [address]  Phone: [phone]  Fax: [phone]  Follow Up Time:    Marcio Justice  CARDIOLOGY AT [facility]  ADDRESS BELOW  Phone: (   )    -  Fax: (   )    -  Follow Up Time:

## 2023-07-10 NOTE — DISCHARGE NOTE PROVIDER - NSFOLLOWUPCLINICS_GEN_ALL_ED_FT
[facility]  Primary Care  [address] [place] [address]  Phone: [phone]  Fax:      Cardiology at   Cardiology  [address] [place] [address]  Phone: [phone]  Fax:   Scheduled Appointment: 8/1/2023 3:00 PM      Primary Care  [address] [place] [address] [place] [address]  Phone: [phone]  Fax:     [facility]  Endocrinology  [address] [place] [address]  Phone: [phone]  Fax:

## 2023-07-10 NOTE — PROGRESS NOTE ADULT - ASSESSMENT
74 y/o mostly Syriac speaking M with PMH of DM, HTN, CAD s/p 3 stents. Presents with cough for the past 2 weeks. CXR with small L basilar opacity. Also noted to be in hypertensive urgency in ED.

## 2023-07-10 NOTE — DISCHARGE NOTE PROVIDER - HOSPITAL COURSE
73M PMH DM, HTN, CAD s/p 3 stents p/w cough productive of green sputum. Pt says the cough started 2 weeks ago. He came from [place] 1 month ago. Patient admitted for acute bronchitis. CT chest with no clear pneumonia, few GGO/nodules; repeat CT chest in 3 months. During admission, patient received ceftriaxone, duonebs and robitussin with resolution of symptoms. Patient saturating well on room air  Cardiology consulted for  hypertensive urgency and ST depression and LVH noted on EKG. ACS ruled out EF down to 30s.  [facility] with triple vessel disease. CTS consulted w/ no role for CTS. cMRI results show viability for complex PCI due to available cardiac tissue to revascularize. Patient underwent PCI on 7/10 X1 JESS to proxOM and X1 JESS to mRCA.     Discharge/dispo/med rec discussed with attending Dr. Hernandez. Patient is medically cleared for discharge with outpatient follow up       73M PMH DM, HTN, CAD s/p 3 stents p/w cough productive of green sputum. Pt says the cough started 2 weeks ago. He came from [place] 1 month ago. Patient admitted for acute bronchitis. CT chest with no clear pneumonia, few GGO/nodules; repeat CT chest in 3 months. During admission, patient received ceftriaxone, duonebs and robitussin with resolution of symptoms. Patient saturating well on room air  Cardiology consulted for  hypertensive urgency and ST depression and LVH noted on EKG. ACS ruled out EF down to 30s.  [facility] with triple vessel disease. CTS consulted w/ no role for CTS. cMRI results show viability for complex PCI due to available cardiac tissue to revascularize. Patient underwent PCI on 7/10 X1 JESS to proxOM and X1 JESS to mRCA.   day after PCI patient with LLE weakness. CTH neg. MRI with b/l acute, subacute, chronic embolic infarcts. Cardiac monitor places prior to discharge with outpatient follow up with neuro and cardio clinic.   Discharge/dispo/med rec discussed with attending Dr. Hernandez. Patient is medically cleared for discharge with outpatient follow up

## 2023-07-10 NOTE — DISCHARGE NOTE PROVIDER - PROVIDER TOKENS
PROVIDER:[TOKEN:[[id number]]] FREE:[LAST:[Vinh],FIRST:[Marcio],PHONE:[(   )    -],FAX:[(   )    -],ADDRESS:[CARDIOLOGY AT [facility]  ADDRESS BELOW]]

## 2023-07-10 NOTE — DISCHARGE NOTE PROVIDER - NSDCMRMEDTOKEN_GEN_ALL_CORE_FT
amLODIPine 2.5 mg oral tablet: 1 tab(s) orally once a day  aspirin 81 mg oral delayed release tablet: 1 tab(s) orally once a day  losartan 100 mg oral tablet: 1 tab(s) orally once a day  metFORMIN 850 mg oral tablet: 1 tab(s) orally 2 times a day  metoprolol 50m tablet orally once a day  sacubitril-valsartan 49 mg-51 mg oral tablet: 1 tab(s) orally 2 times a day   aspirin 81 mg oral delayed release tablet: 1 tab(s) orally once a day  atorvastatin 40 mg oral tablet: 2 tab(s) orally once a day  clopidogrel 75 mg oral tablet: 1 tab(s) orally once a day  Insulin Pen Needles, 4mm: 1 application subcutaneously 4 times a day. ** Use with insulin pen **  Lantus Solostar Pen 100 units/mL subcutaneous solution: 10 unit(s) subcutaneous once a day (at bedtime)  metFORMIN 1000 mg oral tablet: 1 tab(s) orally 2 times a day  metFORMIN 1000 mg oral tablet: 1 tab(s) orally 2 times a day  metoprolol succinate 50 mg oral tablet, extended release: 1 tab(s) orally once a day  physical therapy: 1  spironolactone 25 mg oral tablet: 1 tab(s) orally once a day  Trulicity Pen 0.75 mg/0.5 mL subcutaneous solution: 0.75 milligram(s) subcutaneously once a week  valsartan 80 mg oral tablet: 1 tab(s) orally once a day

## 2023-07-10 NOTE — PROGRESS NOTE ADULT - ASSESSMENT
73M with CAD s/p 3 stents, HTN, T2DM, admitted for pneumonia, cardiology consulted for ST depressions and LVH noted on EKG.      ST Depressions  Hypertensive urgency resolved    -F/u  TTE noted  cath noted  triple vessel   cts sx eval reviewed recommends complex PCI which will be done today    c/w meds    likely pna  bronchitis  s/p abs  nebs  pulm eval  noted  cards f/u for pci   dm  fsg riss  dvt proph

## 2023-07-10 NOTE — CHART NOTE - NSCHARTNOTEFT_GEN_A_CORE
FEMORAL PERCLOSE  ASSESSMENT NOTE    Right groin perclose in place with good hemostasis  w/o any bleeding or hematoma  Pulses in the RIGHT lower extremity are palpable, ( right femoral and right pedal pulses + 2).  Right groin is soft/non tender, + capillary refill   Patient denies leg/s  or foot pain, numbness, tingling,  or chest pain  post 12 lead EKG WNL, no ST or T wave changes noted when compared to pre procedural EKG    Complications: None    Comments: patient is to remain bed rest for the next 2 hours as per protocol

## 2023-07-10 NOTE — PROGRESS NOTE ADULT - SUBJECTIVE AND OBJECTIVE BOX
SUBJ:    no events overnight   Cath today seen in [facility]    Home Medications:  amLODIPine 2.5 mg oral tablet: 1 tab(s) orally once a day (2023 17:58)  aspirin 81 mg oral delayed release tablet: 1 tab(s) orally once a day (2023 17:58)  losartan 100 mg oral tablet: 1 tab(s) orally once a day (2023 17:58)  metFORMIN 850 mg oral tablet: 1 tab(s) orally 2 times a day (2023 17:58)  metoprolol 50m tablet orally once a day (2023 17:58)      MEDICATIONS  (STANDING):  albuterol/ipratropium for Nebulization 3 milliLiter(s) Nebulizer every 6 hours  aspirin enteric coated 81 milliGRAM(s) Oral daily  atorvastatin 80 milliGRAM(s) Oral at bedtime  dextrose 5%. 1000 milliLiter(s) (50 mL/Hr) IV Continuous <Continuous>  dextrose 5%. 1000 milliLiter(s) (100 mL/Hr) IV Continuous <Continuous>  dextrose 50% Injectable 12.5 Gram(s) IV Push once  dextrose 50% Injectable 25 Gram(s) IV Push once  dextrose 50% Injectable 25 Gram(s) IV Push once  glucagon  Injectable 1 milliGRAM(s) IntraMuscular once  heparin   Injectable 5000 Unit(s) SubCutaneous every 12 hours  insulin glargine Injectable (LANTUS) 18 Unit(s) SubCutaneous at bedtime  insulin lispro (ADMELOG) corrective regimen sliding scale   SubCutaneous three times a day before meals  insulin lispro (ADMELOG) corrective regimen sliding scale   SubCutaneous at bedtime  insulin lispro Injectable (ADMELOG) 5 Unit(s) SubCutaneous three times a day before meals  metoprolol succinate ER 50 milliGRAM(s) Oral daily  sacubitril 49 mG/valsartan 51 mG 1 Tablet(s) Oral two times a day    MEDICATIONS  (PRN):  dextrose Oral Gel 15 Gram(s) Oral once PRN Blood Glucose LESS THAN 70 milliGRAM(s)/deciliter  guaiFENesin Oral Liquid (Sugar-Free) 100 milliGRAM(s) Oral every 6 hours PRN Cough      Vital Signs Last 24 Hrs  T(C): 36.6 (10 Jul 2023 17:36), Max: 36.9 (10 Jul 2023 16:30)  T(F): 97.8 (10 Jul 2023 17:36), Max: 98.4 (10 Jul 2023 16:30)  HR: 67 (10 Jul 2023 17:36) (56 - 82)  BP: 161/94 (10 Jul 2023 17:36) (120/74 - 162/80)  BP(mean): --  RR: 18 (10 Jul 2023 17:36) (16 - 20)  SpO2: 97% (10 Jul 2023 17:36) (96% - 98%)    Parameters below as of 10 Jul 2023 17:36  Patient On (Oxygen Delivery Method): room air        REVIEW OF SYSTEMS:  As per HPI, otherwise unremarkable.     PHYSICAL EXAM:  Constitutional/Appearance: Normal, Well-developed  HEENT:   Normal oral mucosa, no drainage or redness, supple neck  Lymphatic: No lymphadenopathy  Cardiovascular: Normal S1 S2, No edema, RRR  Respiratory: Lungs clear to auscultation, respirations non-labored  Psychiatry: A & O x 3, appropriate affect.   Gastrointestinal:  Soft, Non-tender, no distention  Skin: No rashes, No ecchymoses, No cyanosis	  Neurologic: Non-focal, Alert and oriented x 3  Extremities: Normal range of motion  Vascular: Peripheral pulses palpable 2+ bilaterally (radial)    TELEMETRY: sinus      LABS:  CBC Full  -  ( 2023 09:41 )  WBC Count : 7.35 K/uL  RBC Count : 4.78 M/uL  Hemoglobin : 14.4 g/dL  Hematocrit : 44.5 %  Platelet Count - Automated : 301 K/uL  Mean Cell Volume : 93.1 fl  Mean Cell Hemoglobin : 30.1 pg  Mean Cell Hemoglobin Concentration : 32.4 gm/dL  Auto Neutrophil # : x  Auto Lymphocyte # : x  Auto Monocyte # : x  Auto Eosinophil # : x  Auto Basophil # : x  Auto Neutrophil % : x  Auto Lymphocyte % : x  Auto Monocyte % : x  Auto Eosinophil % : x  Auto Basophil % : x        136  |  101  |  20  ----------------------------<  129<H>  4.7   |  25  |  0.81    Ca    9.6      2023 09:41  Phos  3.0     07-  Mg     2.1     -      IMPRESSION AND PLAN:  	  73 y.o with a PMH of CAD s/p 3 stents, HTN, T2DM, admitted for pneumonia, cardiology consulted for ST depression and LVH noted on EKG. ACS ruled out EF down to 30s, LHC showed TVD.    CHF   TTE w/ EF 30-35%.   - euvolemic on exam  - Continue entresto 49/51mg bid  - Continue metop succinate 50mg daily  - Will consider further addition of MRA     CAD  - continue Aspirin, atorva 80  - LHC with triple vessel disease. Cath today with Complex PCI with impella with Adriana Gross      Plan discussed earlier with ANGELINE Burr MD  Cardiology Attending

## 2023-07-10 NOTE — DISCHARGE NOTE PROVIDER - NSDCFUADDAPPT_GEN_ALL_CORE_FT
APPTS ARE READY TO BE MADE: [x ] YES    Best Family or Patient Contact (if needed):    Additional Information about above appointments (if needed):    1:   2:   3:     Other comments or requests:    APPTS ARE READY TO BE MADE: [x ] YES    Best Family or Patient Contact (if needed):    Additional Information about above appointments (if needed):    1:   2:   3:     Patient/Caregiver was provided with follow up request details and prefers to call the providers office on their own to schedule.     Patient was previously scheduled with Dr. Justice 8/1 [place] 3:00PM    Other comments or requests:

## 2023-07-10 NOTE — DISCHARGE NOTE PROVIDER - NSFOLLOWUPCLINICSTOKEN_GEN_ALL_ED_FT
[id number]: || ||00\01||False; [id number]: ||8/1/2023||15\00\00||False;[id number]: || ||00\01||False;[id number]: || ||00\01||False;

## 2023-07-10 NOTE — DISCHARGE NOTE PROVIDER - NSDCCPCAREPLAN_GEN_ALL_CORE_FT
PRINCIPAL DISCHARGE DIAGNOSIS  Diagnosis: Acute bronchitis  Assessment and Plan of Treatment: You were admitted for acute bronchitis   Pulmonology was consulted; during admission you received antibiotics, duonebs and supportive treatment   Your cough has since resolved   Follow up with your PCP within one week for further outpatient management      SECONDARY DISCHARGE DIAGNOSES  Diagnosis: Hypertensive urgency  Assessment and Plan of Treatment: During admission your blood pressure was high and you had changes on your EKG  Cardiology was consulted; you had a cath which revealed triple vessel disease  Cardiothoracic surgery was consulted; no need for surgical intervention   You had a cardiac MRI which revealed viability for a PCI   You underwent a PCI on 7/10 and had two stents placed   Follow up with cardiology for further outpatient management    Diagnosis: Hypertension  Assessment and Plan of Treatment: Continue to follow a low salt/sodium diet.  Perform physical activities as tolerated in consultation with your Primary Care Provider and physical therapist.  Take all medications as prescribed.  Follow up with your medical doctor for routine blood pressure monitoring at your next visit.  Notify your doctor if you have any of the following symptoms:  Dizziness, lightheadedness, blurry vision, headache, chest pain, or shortness of breath.    Diagnosis: Hyperlipidemia  Assessment and Plan of Treatment: Low salt, low fat, low cholesterol, diabetic diet if appropriate  Continue medication as prescribed  Exercise, increase your activity level  Pt. verbalized an understanding of all instructions.    Diagnosis: DM2 (diabetes mellitus, type 2)  Assessment and Plan of Treatment: Make sure you get your HgA1c checked every three months.  If you take oral diabetes medications, check your blood glucose at least two times a day.  If you take short-acting insulin, check your blood glucose before meals and at bedtime.  It's important not to skip any meals.  Keep a log of your blood glucose results and always take it with you to your doctor appointments.  Keep a list of your current medications including over the counter medications and bring this medication list with you to all your doctor appointments.  If you have not seen your ophthalmologist this year, call for appointment.  Check your feet daily for redness, sores, or openings.  Do not self treat.  If there is no improvement in two days, call your primary care physician for an appointment.    Diagnosis: Pulmonary nodule  Assessment and Plan of Treatment: Pulmonary nodules were found as an incidental finding on your CT chest   Pulmonology was consulted   Follow up outpatient for repeat CT scan in 3 months

## 2023-07-10 NOTE — PROGRESS NOTE ADULT - PROBLEM SELECTOR PLAN 1
acute bronchitis - CT chest with no clear PNA, few GGO/nodules  -Small L basilar opacity seen on CXR corresponds to atelectasis seen on CT chest   -Cough x 2 weeks, rhonchi L base (now improving)  -S/p Ceftriaxone x 5 days   -Continue Duoneb q6h  -Normoxic, keep sats >90% with o2 PRN.  -Cough resolved. Robitussin changed to q6h PRN.

## 2023-07-10 NOTE — DISCHARGE NOTE PROVIDER - NSDCFUSCHEDAPPT_GEN_ALL_CORE_FT
[facility] Physician [facility]  CARDIOLOGY [address]  Scheduled Appointment: 08/01/2023

## 2023-07-10 NOTE — PROGRESS NOTE ADULT - SUBJECTIVE AND OBJECTIVE BOX
Follow-up Pulm Progress Note    No new respiratory events overnight.  Denies SOB/CP.   cough resolved       Medications:  MEDICATIONS  (STANDING):  albuterol/ipratropium for Nebulization 3 milliLiter(s) Nebulizer every 6 hours  aspirin enteric coated 81 milliGRAM(s) Oral daily  atorvastatin 80 milliGRAM(s) Oral at bedtime  dextrose 5%. 1000 milliLiter(s) (100 mL/Hr) IV Continuous <Continuous>  dextrose 5%. 1000 milliLiter(s) (50 mL/Hr) IV Continuous <Continuous>  dextrose 50% Injectable 12.5 Gram(s) IV Push once  dextrose 50% Injectable 25 Gram(s) IV Push once  dextrose 50% Injectable 25 Gram(s) IV Push once  glucagon  Injectable 1 milliGRAM(s) IntraMuscular once  guaiFENesin Oral Liquid (Sugar-Free) 100 milliGRAM(s) Oral every 6 hours  heparin   Injectable 5000 Unit(s) SubCutaneous every 12 hours  insulin glargine Injectable (LANTUS) 18 Unit(s) SubCutaneous at bedtime  insulin lispro (ADMELOG) corrective regimen sliding scale   SubCutaneous at bedtime  insulin lispro (ADMELOG) corrective regimen sliding scale   SubCutaneous three times a day before meals  insulin lispro Injectable (ADMELOG) 5 Unit(s) SubCutaneous three times a day before meals  metoprolol succinate ER 50 milliGRAM(s) Oral daily  sacubitril 49 mG/valsartan 51 mG 1 Tablet(s) Oral two times a day    MEDICATIONS  (PRN):  dextrose Oral Gel 15 Gram(s) Oral once PRN Blood Glucose LESS THAN 70 milliGRAM(s)/deciliter          Vital Signs Last 24 Hrs  T(C): 36.4 (10 Jul 2023 06:37), Max: 36.8 (10 Jul 2023 01:05)  T(F): 97.6 (10 Jul 2023 06:37), Max: 98.3 (10 Jul 2023 01:05)  HR: 56 (10 Jul 2023 06:37) (56 - 82)  BP: 149/72 (10 Jul 2023 06:37) (120/74 - 149/72)  BP(mean): --  RR: 18 (10 Jul 2023 06:37) (18 - 18)  SpO2: 96% (10 Jul 2023 06:37) (96% - 98%)    Parameters below as of 10 Jul 2023 06:37  Patient On (Oxygen Delivery Method): room air              07-09 @ 07:01  -  07-10 @ 07:00  --------------------------------------------------------  IN: 960 mL / OUT: 0 mL / NET: 960 mL          LABS:                        14.4   7.35  )-----------( 301      ( 09 Jul 2023 09:41 )             44.5     07-09    136  |  101  |  20  ----------------------------<  129<H>  4.7   |  25  |  0.81    Ca    9.6      09 Jul 2023 09:41  Phos  3.0     07-09  Mg     2.1     07-09            CAPILLARY BLOOD GLUCOSE      POCT Blood Glucose.: 131 mg/dL (10 Jul 2023 09:06)      Urinalysis Basic - ( 09 Jul 2023 09:41 )    Color: x / Appearance: x / SG: x / pH: x  Gluc: 129 mg/dL / Ketone: x  / Bili: x / Urobili: x   Blood: x / Protein: x / Nitrite: x   Leuk Esterase: x / RBC: x / WBC x   Sq Epi: x / Non Sq Epi: x / Bacteria: x          Physical Examination:  PULM: CTA bilaterally   CVS: S1, S2 heard        RADIOLOGY REVIEWED  CXR 7/3: grossly clear     CT chest: < from: CT Chest No Cont (06.27.23 @ 11:01) >  FINDINGS:    LYMPH NODES: No lymphadenopathy.    HEART/VASCULATURE: The heart is normal in size. Aortic and coronary   artery calcifications. No pericardial effusion. Bilateral partially   calcified pleural plaques compatible with asbestos exposure.    AIRWAYS/LUNGS/PLEURA: The central airways are patent. Bilateral lower   lobe dependent linear opacities. Right upper lobe subsolid nodule   measures 6 mm (3-47). Right upper lobe perifissural ground glass nodule   measures 5 mm (7-144, 3-60). Mild peribronchovascular groundglass in the   lateral segment of the right middle lobe. No pleural effusion or   pneumothorax.    UPPER ABDOMEN: Unremarkable.    BONES/SOFT TISSUES: Mild degenerative changes.    IMPRESSION:    Bilateral lower lobe dependent atelectasis.    Mild right middle lobe groundglass and small right upper lobe ground   glass nodules which maybe inflammatory. Recommend CT chest in 3 months   to see if they persist.    < end of copied text >      TTE: < from: TTE W or WO Ultrasound Enhancing Agent (06.27.23 @ 14:05) >  CONCLUSIONS:      1. Normal left ventricular cavity size. The left ventricular wall thickness is normal. The left ventricular systolic function is severely decreased with an ejection fraction visually estimated at 30 to 35 %. There are regional wall motion abnormalities No evidence of a thrombus in the left ventricle.   2. Multiple segmental abnormalities exist. See findings.   3. There is mild (grade 1) left ventricular diastolic dysfunction, with normal filling pressure.   4. Normal right ventricular cavity size, normal right ventricular wall thickness and normal right ventricular systolic function. The tricuspid annular plane systolic excursion (TAPSE) is 2.1 cm (normal >=1.7 cm).   5. The right atrium is normal.   6. No pericardial effusion seen.   7. No prior echocardiogram is available for comparison.   8. Symmetric mitral valve leaflet tethering.   9. There is normal LV mass and normal geometry.    ________________________________________________________________________________________  FINDINGS:     Left Ventricle:  Normal left ventricular cavity size. The left ventricular wall thickness is normal. The left ventricular systolic function is severely decreased with an ejection fraction visually estimated at 30 to 35%. There are regional wall motion abnormalities consistent with ischemic heart disease. There is mild (grade 1) left ventricular diastolic dysfunction, with normal filling pressure. There is normal LV mass and normal geometry. There is no evidence of a thrombus in the left ventricle.  LV Wall Scoring: The apex is akinetic. The mid and apical anterior septum, mid  inferolateral segment, apical lateral segment, apical anterior segment, and  basal inferior segment are hypokinetic. All remaining scored segments are  normal.          Right Ventricle:  Normal right ventricular cavity size, normal wall thickness and normal right ventricular systolic function. Tricuspid annular plane systolic excursion (TAPSE) is 2.1 cm (normal >=1.7 cm).     Left Atrium:  The left atrium is normal.     Right Atrium:  The right atrium is normal.     Aortic Valve:  The aortic valve is tricuspid with normal structure without stenosis. There is no evidence of aortic regurgitation.     Mitral Valve:  Structurally normal mitral valve with normal leaflet opening and closing, without any evidence of mitral stenosis or significant regurgitation. There is symmetric leaflet tethering. There is trace mitral regurgitation.    Tricuspid Valve:  Structurally normal tricuspid valve with normal leaflet excursion. There is trace tricuspid regurgitation.     Pulmonic Valve:  Structurally normal pulmonic valve with normal leaflet excursion. There is trace pulmonic regurgitation.     Aorta:  The aortic annulus and aortic root appear normal in size. Normal aorta sinus of Valsalva, measuring 3.20 cm (indexed 2.05 cm/m²).     Pericardium:  No pericardial effusion seen.  ____________________________________________________________________  Quantitative Data:  Left Ventricle Measurements: (Indexed to BSA)     IVSd (2D):   0.9 cm  LVPWd (2D):  0.9 cm  LVIDd (2D):  4.9 cm  LVIDs (2D):  4.1 cm  LV Mass:     151 g  96.9 g/m²  Visualized LV EF%: 30 to 35%     MV E Vmax:    0.57 m/s  MV A Vmax:    1.16 m/s  MV E/A:       0.49  e' lateral:   5.98 cm/s  e' medial:    4.13 cm/s  E/e' lateral: 9.55  E/e' medial:  13.83  E/e' Average: 11.30    Aorta Measurements:     Ao Sinus:      3.2 cm  Ao Root:       3.2 cm  Ao Root s, 2D: 3.2 cm       Left Atrium Measurements: (Indexed to BSA)  LA Diam 2D: 3.40 cm    Right Ventricle Measurements:     TAPSE: 2.1 cm       LVOT / RVOT/ Qp/Qs Data: (Indexed to BSA)  Mitral Valve Measurements:     MV E Vmax: 0.6 m/s  MV A Vmax: 1.2 m/s  MV E/A:   0.5       --------------------------------------------------------------------------------  [employer]:  LV Analysis:  EF: 25 %      < end of copied text >

## 2023-07-10 NOTE — DISCHARGE NOTE PROVIDER - NSDCFUADDINST_GEN_ALL_CORE_FT
Follow up with your PCP or with the [facility] within one week for further outpatient management, including a repeat CT chest in 3 months to evaluate pulmonary nodules

## 2023-07-11 LAB
ANION GAP SERPL CALC-SCNC: 10 MMOL/L — SIGNIFICANT CHANGE UP (ref 5–17)
BUN SERPL-MCNC: 18 MG/DL — SIGNIFICANT CHANGE UP (ref 7–23)
CALCIUM SERPL-MCNC: 9.6 MG/DL — SIGNIFICANT CHANGE UP (ref 8.4–10.5)
CHLORIDE SERPL-SCNC: 102 MMOL/L — SIGNIFICANT CHANGE UP (ref 96–108)
CO2 SERPL-SCNC: 23 MMOL/L — SIGNIFICANT CHANGE UP (ref 22–31)
CREAT SERPL-MCNC: 0.8 MG/DL — SIGNIFICANT CHANGE UP (ref 0.5–1.3)
EGFR: 92 ML/MIN/1.73M2 — SIGNIFICANT CHANGE UP
GLUCOSE BLDC GLUCOMTR-MCNC: 110 MG/DL — HIGH (ref 70–99)
GLUCOSE BLDC GLUCOMTR-MCNC: 116 MG/DL — HIGH (ref 70–99)
GLUCOSE BLDC GLUCOMTR-MCNC: 175 MG/DL — HIGH (ref 70–99)
GLUCOSE BLDC GLUCOMTR-MCNC: 94 MG/DL — SIGNIFICANT CHANGE UP (ref 70–99)
GLUCOSE SERPL-MCNC: 134 MG/DL — HIGH (ref 70–99)
HCT VFR BLD CALC: 43.5 % — SIGNIFICANT CHANGE UP (ref 39–50)
HGB BLD-MCNC: 13.9 G/DL — SIGNIFICANT CHANGE UP (ref 13–17)
MAGNESIUM SERPL-MCNC: 2.1 MG/DL — SIGNIFICANT CHANGE UP (ref 1.6–2.6)
MCHC RBC-ENTMCNC: 30 PG — SIGNIFICANT CHANGE UP (ref 27–34)
MCHC RBC-ENTMCNC: 32 GM/DL — SIGNIFICANT CHANGE UP (ref 32–36)
MCV RBC AUTO: 93.8 FL — SIGNIFICANT CHANGE UP (ref 80–100)
NRBC # BLD: 0 /100 WBCS — SIGNIFICANT CHANGE UP (ref 0–0)
PLATELET # BLD AUTO: 251 K/UL — SIGNIFICANT CHANGE UP (ref 150–400)
POTASSIUM SERPL-MCNC: 4.6 MMOL/L — SIGNIFICANT CHANGE UP (ref 3.5–5.3)
POTASSIUM SERPL-SCNC: 4.6 MMOL/L — SIGNIFICANT CHANGE UP (ref 3.5–5.3)
RBC # BLD: 4.64 M/UL — SIGNIFICANT CHANGE UP (ref 4.2–5.8)
RBC # FLD: 13.5 % — SIGNIFICANT CHANGE UP (ref 10.3–14.5)
SODIUM SERPL-SCNC: 135 MMOL/L — SIGNIFICANT CHANGE UP (ref 135–145)
WBC # BLD: 8.07 K/UL — SIGNIFICANT CHANGE UP (ref 3.8–10.5)
WBC # FLD AUTO: 8.07 K/UL — SIGNIFICANT CHANGE UP (ref 3.8–10.5)

## 2023-07-11 PROCEDURE — 70498 CT ANGIOGRAPHY NECK: CPT | Mod: 26

## 2023-07-11 PROCEDURE — 99233 SBSQ HOSP IP/OBS HIGH 50: CPT

## 2023-07-11 PROCEDURE — 70496 CT ANGIOGRAPHY HEAD: CPT | Mod: 26

## 2023-07-11 PROCEDURE — 99232 SBSQ HOSP IP/OBS MODERATE 35: CPT

## 2023-07-11 RX ORDER — INSULIN GLARGINE 100 [IU]/ML
16 INJECTION, SOLUTION SUBCUTANEOUS AT BEDTIME
Refills: 0 | Status: DISCONTINUED | OUTPATIENT
Start: 2023-07-11 | End: 2023-07-17

## 2023-07-11 RX ADMIN — Medication 50 MILLIGRAM(S): at 07:09

## 2023-07-11 RX ADMIN — Medication 5 UNIT(S): at 17:57

## 2023-07-11 RX ADMIN — HEPARIN SODIUM 5000 UNIT(S): 5000 INJECTION INTRAVENOUS; SUBCUTANEOUS at 17:57

## 2023-07-11 RX ADMIN — CLOPIDOGREL BISULFATE 75 MILLIGRAM(S): 75 TABLET, FILM COATED ORAL at 12:59

## 2023-07-11 RX ADMIN — Medication 81 MILLIGRAM(S): at 12:59

## 2023-07-11 RX ADMIN — INSULIN GLARGINE 16 UNIT(S): 100 INJECTION, SOLUTION SUBCUTANEOUS at 22:12

## 2023-07-11 RX ADMIN — SACUBITRIL AND VALSARTAN 1 TABLET(S): 24; 26 TABLET, FILM COATED ORAL at 17:57

## 2023-07-11 RX ADMIN — HEPARIN SODIUM 5000 UNIT(S): 5000 INJECTION INTRAVENOUS; SUBCUTANEOUS at 07:10

## 2023-07-11 RX ADMIN — ATORVASTATIN CALCIUM 80 MILLIGRAM(S): 80 TABLET, FILM COATED ORAL at 21:34

## 2023-07-11 RX ADMIN — Medication 0: at 22:12

## 2023-07-11 RX ADMIN — Medication 5 UNIT(S): at 12:59

## 2023-07-11 RX ADMIN — Medication 5 UNIT(S): at 09:23

## 2023-07-11 RX ADMIN — SACUBITRIL AND VALSARTAN 1 TABLET(S): 24; 26 TABLET, FILM COATED ORAL at 07:09

## 2023-07-11 NOTE — PROGRESS NOTE ADULT - PROBLEM SELECTOR PLAN 1
- Test BGs ACTID and at HS  - Decrease Lantus dose to 16 units nightly   - Continue Admelog 5 units before meals, Hold if NPO or not eating  - C/w low admelog correction scale before meals and before bedtime  Discharge  - Will be determine based on insulin requirement, BG trends and oral intake  - Limited options for diabetes medications due to undocumented status with no insurance.   - Basal insulin + oral regimen -SGLT2 ( Farxiga or Jardiance ) given Heart failure  and Metformin  - [facility]  - if he is not eligible for [facility], not able to get insulin due to cost, then can use coupons until pt is back in [place]. Spoke to pt he needs to get pharmacy in [place] that carries meds he is prescribe for.     -Pt needs basal insulin for now. Will use coupon as well at time of discharge  - Make sure pt has glucometer, strips and Lancets, insulin and insulin pens  - Follow up at medicine clinic [place]  -Needs optho and cardiac f/u as well

## 2023-07-11 NOTE — PROVIDER CONTACT NOTE (OTHER) - SITUATION
Orthopedic
remote tele event: PATS for 2.4 sec, 
Pt was vomiting and complaining of nausea
PT stated that he is having left leg weakness

## 2023-07-11 NOTE — PROVIDER CONTACT NOTE (OTHER) - BACKGROUND
Pt came to the hospital with SOB and cough. Pt was dx with CHF. CT of chest shows atelectasis and ground glass nodules. Pt had a diagnostic cardiac cath today and was dx with triple vessel disease.
S/p 2 Stents placed via right groin on  7/10/23
PMH: DM, HTN. Admitted for SOB, green productive sputum.

## 2023-07-11 NOTE — PROGRESS NOTE ADULT - SUBJECTIVE AND OBJECTIVE BOX
DIABETES FOLLOW UP NOTE: Saw pt earlier today    Chief Complaint: Endocrine consult requested for management of T2DM    INTERVAL HX: Pt stable, reports tolerating POs with BG mostly at goal while on present insulin doses (90s to 100s). No hypoglycemia but noted FBG coming down everyday.  Denies any CP/SOB at time of visit. S/p PCI yesterday.       Review of Systems:  General: As above  Cardiovascular: No chest pain, palpitations  Respiratory: No SOB, no cough  GI: No nausea, vomiting, abdominal pain  Endocrine: No polyuria, polydipsia or S&Sx of hypoglycemia    Allergies    No Known Allergies    Intolerances      MEDICATIONS:  atorvastatin 80 milliGRAM(s) Oral at bedtime  insulin glargine Injectable (LANTUS) 18 Unit(s) SubCutaneous at bedtime  insulin lispro (ADMELOG) corrective regimen sliding scale   SubCutaneous at bedtime  insulin lispro (ADMELOG) corrective regimen sliding scale   SubCutaneous three times a day before meals  insulin lispro Injectable (ADMELOG) 5 Unit(s) SubCutaneous three times a day before meals      PHYSICAL EXAM:  VITALS: T(C): 36.6 (07-11-23 @ 17:00)  T(F): 97.9 (07-11-23 @ 17:00), Max: 98.2 (07-11-23 @ 01:25)  HR: 63 (07-11-23 @ 17:00) (60 - 70)  BP: 120/67 (07-11-23 @ 17:00) (107/64 - 158/90)  RR:  (17 - 18)  SpO2:  (96% - 98%)  Wt(kg): --  GENERAL: Male sitting in chair in NAD. Wife at bedside  Abdomen: Soft, nontender, non distended, central adiposity  Extremities: Warm, no edema in all 4 exts R inguinal area with some bruising> no bleeding, no tender  NEURO: A&O X3. Encounter done in [place]      LABS:  POCT Blood Glucose.: 110 mg/dL (07-11-23 @ 17:23)  POCT Blood Glucose.: 94 mg/dL (07-11-23 @ 12:53)  POCT Blood Glucose.: 116 mg/dL (07-11-23 @ 09:09)  POCT Blood Glucose.: 166 mg/dL (07-10-23 @ 21:40)  POCT Blood Glucose.: 160 mg/dL (07-10-23 @ 17:56)  POCT Blood Glucose.: 155 mg/dL (07-10-23 @ 17:38)  POCT Blood Glucose.: 143 mg/dL (07-10-23 @ 15:11)  POCT Blood Glucose.: 131 mg/dL (07-10-23 @ 09:06)  POCT Blood Glucose.: 150 mg/dL (07-09-23 @ 22:11)  POCT Blood Glucose.: 120 mg/dL (07-09-23 @ 17:32)  POCT Blood Glucose.: 139 mg/dL (07-09-23 @ 12:34)  POCT Blood Glucose.: 133 mg/dL (07-09-23 @ 08:29)  POCT Blood Glucose.: 176 mg/dL (07-08-23 @ 22:13)                            13.9   8.07  )-----------( 251      ( 11 Jul 2023 09:48 )             43.5       07-11    135  |  102  |  18  ----------------------------<  134<H>  4.6   |  23  |  0.80    eGFR: 92    Ca    9.6      07-11  Mg     2.1     07-11  Phos  3.0     07-09      Thyroid Function Tests:  06-29 @ 19:11 TSH 4.73 FreeT4 -- T3 66 Anti TPO -- Anti Thyroglobulin Ab -- TSI --  06-27 @ 07:41 TSH 5.56 FreeT4 -- T3 -- Anti TPO -- Anti Thyroglobulin Ab -- TSI --      A1C with Estimated Average Glucose Result: 10.0 % (06-27-23 @ 07:41)      Estimated Average Glucose: 240 mg/dL (06-27-23 @ 07:41)        06-27 Chol 183 Direct LDL -- LDL calculated 114<H> HDL 43 Trig 136               DIABETES FOLLOW UP NOTE: Saw pt earlier today    Chief Complaint: Endocrine consult requested for management of T2DM    INTERVAL HX: Pt stable, reports tolerating POs with BG mostly at goal while on present insulin doses (90s to 100s). No hypoglycemia but noted FBG coming down everyday.  Denies any CP/SOB at time of visit. S/p s/p X1 JESS to proxOM and X1 JESS to mRCA via RFA yesterday. Pt c/o LLE weakness unable to walk with that leg.       Review of Systems:  General: As above  Cardiovascular: No chest pain, palpitations  Respiratory: No SOB, no cough  GI: No nausea, vomiting, abdominal pain  Endocrine: No polyuria, polydipsia or S&Sx of hypoglycemia    Allergies    No Known Allergies    Intolerances      MEDICATIONS:  atorvastatin 80 milliGRAM(s) Oral at bedtime  insulin glargine Injectable (LANTUS) 18 Unit(s) SubCutaneous at bedtime  insulin lispro (ADMELOG) corrective regimen sliding scale   SubCutaneous at bedtime  insulin lispro (ADMELOG) corrective regimen sliding scale   SubCutaneous three times a day before meals  insulin lispro Injectable (ADMELOG) 5 Unit(s) SubCutaneous three times a day before meals      PHYSICAL EXAM:  VITALS: T(C): 36.6 (07-11-23 @ 17:00)  T(F): 97.9 (07-11-23 @ 17:00), Max: 98.2 (07-11-23 @ 01:25)  HR: 63 (07-11-23 @ 17:00) (60 - 70)  BP: 120/67 (07-11-23 @ 17:00) (107/64 - 158/90)  RR:  (17 - 18)  SpO2:  (96% - 98%)  Wt(kg): --  GENERAL: Male sitting in chair in NAD. Wife at bedside  Abdomen: Soft, nontender, non distended, central adiposity  Extremities: Warm, no edema in all 4 exts R inguinal area with some bruising> no bleeding, no tender  NEURO: A&O X3. Encounter done in [place]      LABS:  POCT Blood Glucose.: 110 mg/dL (07-11-23 @ 17:23)  POCT Blood Glucose.: 94 mg/dL (07-11-23 @ 12:53)  POCT Blood Glucose.: 116 mg/dL (07-11-23 @ 09:09)  POCT Blood Glucose.: 166 mg/dL (07-10-23 @ 21:40)  POCT Blood Glucose.: 160 mg/dL (07-10-23 @ 17:56)  POCT Blood Glucose.: 155 mg/dL (07-10-23 @ 17:38)  POCT Blood Glucose.: 143 mg/dL (07-10-23 @ 15:11)  POCT Blood Glucose.: 131 mg/dL (07-10-23 @ 09:06)  POCT Blood Glucose.: 150 mg/dL (07-09-23 @ 22:11)  POCT Blood Glucose.: 120 mg/dL (07-09-23 @ 17:32)  POCT Blood Glucose.: 139 mg/dL (07-09-23 @ 12:34)  POCT Blood Glucose.: 133 mg/dL (07-09-23 @ 08:29)  POCT Blood Glucose.: 176 mg/dL (07-08-23 @ 22:13)                            13.9   8.07  )-----------( 251      ( 11 Jul 2023 09:48 )             43.5       07-11    135  |  102  |  18  ----------------------------<  134<H>  4.6   |  23  |  0.80    eGFR: 92    Ca    9.6      07-11  Mg     2.1     07-11  Phos  3.0     07-09      Thyroid Function Tests:  06-29 @ 19:11 TSH 4.73 FreeT4 -- T3 66 Anti TPO -- Anti Thyroglobulin Ab -- TSI --  06-27 @ 07:41 TSH 5.56 FreeT4 -- T3 -- Anti TPO -- Anti Thyroglobulin Ab -- TSI --      A1C with Estimated Average Glucose Result: 10.0 % (06-27-23 @ 07:41)      Estimated Average Glucose: 240 mg/dL (06-27-23 @ 07:41)        06-27 Chol 183 Direct LDL -- LDL calculated 114<H> HDL 43 Trig 136

## 2023-07-11 NOTE — PROGRESS NOTE ADULT - ASSESSMENT
73M with CAD s/p 3 stents, HTN, T2DM, admitted for pneumonia, cardiology consulted for ST depressions and LVH noted on EKG.      ST Depressions  Hypertensive urgency resolved    -F/u  TTE noted  cath noted  triple vessel   s/p cath w/ pci    llext weakness  r/o cva  cts as perp neuro     c/w meds    likely pna  bronchitis  s/p abs  nebs  pulm eval  noted    dm  fsg riss  dvt proph

## 2023-07-11 NOTE — CONSULT NOTE ADULT - ATTENDING COMMENTS
I reviewed available diagnostic studies, and reviewed images personally. I agree with resident's history, exam, orders placed, and plan of care. Medical issues needing to be addressed include: LLE weakness, DM, HTN, DM, CAD s/p stents. Currently he is on asa/plavix with heparin dvt ppx given recent stent. CTA w/ multifocal ICAD. Obtain MRI for further evaluation, ok to continue current antiplt regimen and chemodvt ppx. F up risk factors, atorvastatin 80mg if no contraindications. Pt is stable,
73 year old man with coronary stents, admitted for pneumonia, has LVH and EKG consistent with LVH and repolarization abnormality and there are no findings to suggest acute coronary syndrome. However POCUS suggest LV dysfunction with regional hypokinesis. Thus need cardiac echo Doppler.    To contact call Cardiology Fellow or Attending as listed on [email or web address].
72 yo M with type 2 DM on Metformin at home. Poor historian, receives most of his medical care outside the [place]. Poorly controlled DM, start insulin regimen while inpatient. May need assistance with medications at discharge given insurance situation.     Dave Suggs MD  Attending Physician   Division of Endocrinology, Diabetes and Metabolism   9AM-5PM: Please contact via [employer] Teams

## 2023-07-11 NOTE — CONSULT NOTE ADULT - ASSESSMENT
73y (1950) RH man with a PMHx significant for DM, HTN, CAD s/p 3 stents who presented with cough productive of green sputum with concerns for pneumonia, found to have ST changes on EKG with concerning echocardiogram findings for which patient underwent left heart catheterization on 6/29/2023. Cath demonstrated 3x CAD for which patient was planned for complex PCI which occurred on 7/10/2023. Neurology is consulted for LLE weakness. LKW 11 AM 7/10/2023, patient then went for his PCI around noon 7/10/2023 and was recommended to limit lower extremity movement given recent procedure. Patient first noted the symptoms when attempting to use the restroom at 5 AM today 7/11/2023. Patient noted slight weakness of his LLE when walking. Otherwise denies headache, nausea, vomiting, dizziness, fevers, chest pain, sensory changes, vision changes, or problems at his cath site. Prior to admission patient was on aspirin daily, currently he is on aspirin and Plavix with heparin SubQ for DVT prophylaxis.    LKW: 11 AM 7/10/2023  NIHSS: 0  Baseline MRS: 0  Not a tenecteplase candidate due to outside time window  Not a thrombectomy candidate.   Code stroke was not called as last known well was reported outside 24 hour window, patient's symptoms and exam less suggestive of large vessel occlusion    Impression: LLE weakness in the setting of multiple cardiac comorbidities. Etiology unclear but will evaluate for ischemic stroke      Recommendations:  [ ] CT Head  [ ] CTA head & neck w/ con (if con is contraindicated, can consider MRA of head & neck w/ con)  [ ] Will decide regarding anti-platelet recs after imaging  [ ] Atorvastatin 40-80 mg daily titrated per LDL < 70  [ ] HgbA1C, fasting lipid panel, CBC, CMP, coag panel, troponin  [ ] MRI brain w/o con   [ ] TTE  [ ] telemetry to check for arrhythmia, EKG    - Glucose control   - Neuro-checks and VS q4h  - Permissive HTN up to 220/120 for 24-48h from symptom onset. Unless contraindicated from cardiac prespective  - Dysphagia screen. If fails, speech/swallow eval  - aspiration, fall precautions  - STAT CT head non-contrast for change in neuro exam.   - PT/ OT / DVT ppx per primary team     Case to be discussed with neurology attending. Recommendations finalized once discussed with attending

## 2023-07-11 NOTE — PROGRESS NOTE ADULT - PROBLEM SELECTOR PLAN 3
Currently on Atorvastatin 80 mg daily  Goal LDL < 55  Pt   Continue with high intensity statin given his CAD and HFrEF  Follow up levels as out pt

## 2023-07-11 NOTE — PROGRESS NOTE ADULT - SUBJECTIVE AND OBJECTIVE BOX
DATE OF SERVICE: 07-11-23 @ 14:02  CHIEF COMPLAINT:Patient is a 73y old  Male who presents with a chief complaint of cp (10 Jul 2023 21:33)    	        PAST MEDICAL & SURGICAL HISTORY:  HTN (hypertension)      DM (diabetes mellitus)              events noted      Medications:  MEDICATIONS  (STANDING):  albuterol/ipratropium for Nebulization 3 milliLiter(s) Nebulizer every 6 hours  aspirin enteric coated 81 milliGRAM(s) Oral daily  atorvastatin 80 milliGRAM(s) Oral at bedtime  clopidogrel Tablet 75 milliGRAM(s) Oral daily  dextrose 5%. 1000 milliLiter(s) (50 mL/Hr) IV Continuous <Continuous>  dextrose 5%. 1000 milliLiter(s) (100 mL/Hr) IV Continuous <Continuous>  dextrose 50% Injectable 12.5 Gram(s) IV Push once  dextrose 50% Injectable 25 Gram(s) IV Push once  dextrose 50% Injectable 25 Gram(s) IV Push once  glucagon  Injectable 1 milliGRAM(s) IntraMuscular once  heparin   Injectable 5000 Unit(s) SubCutaneous every 12 hours  insulin glargine Injectable (LANTUS) 18 Unit(s) SubCutaneous at bedtime  insulin lispro (ADMELOG) corrective regimen sliding scale   SubCutaneous at bedtime  insulin lispro (ADMELOG) corrective regimen sliding scale   SubCutaneous three times a day before meals  insulin lispro Injectable (ADMELOG) 5 Unit(s) SubCutaneous three times a day before meals  metoprolol succinate ER 50 milliGRAM(s) Oral daily  sacubitril 49 mG/valsartan 51 mG 1 Tablet(s) Oral two times a day    MEDICATIONS  (PRN):  dextrose Oral Gel 15 Gram(s) Oral once PRN Blood Glucose LESS THAN 70 milliGRAM(s)/deciliter  guaiFENesin Oral Liquid (Sugar-Free) 100 milliGRAM(s) Oral every 6 hours PRN Cough    	    PHYSICAL EXAM:  T(C): 36.3 (07-11-23 @ 13:11), Max: 36.9 (07-10-23 @ 16:30)  HR: 60 (07-11-23 @ 13:11) (59 - 70)  BP: 125/70 (07-11-23 @ 13:11) (107/64 - 161/94)  RR: 18 (07-11-23 @ 13:11) (16 - 20)  SpO2: 96% (07-11-23 @ 13:11) (96% - 98%)  Wt(kg): --  I&O's Summary    10 Jul 2023 07:01  -  11 Jul 2023 07:00  --------------------------------------------------------  IN: 1440 mL / OUT: 500 mL / NET: 940 mL    11 Jul 2023 07:01  -  11 Jul 2023 14:02  --------------------------------------------------------  IN: 480 mL / OUT: 300 mL / NET: 180 mL          HEENT:   Normal oral mucosa,   Cardiovascular: Normal S1 S2,  Respiratory: Lungs clear to auscultation	  Psychiatry: A & O x 3, Mood & affect appropriate  Gastrointestinal:  Soft, Non-tender, + BS	  llext   weakness     TELEMETRY: 	    ECG:  	  RADIOLOGY:  OTHER: 	  	  LABS:	 	    CARDIAC MARKERS:                                13.9   8.07  )-----------( 251      ( 11 Jul 2023 09:48 )             43.5     07-11    135  |  102  |  18  ----------------------------<  134<H>  4.6   |  23  |  0.80    Ca    9.6      11 Jul 2023 09:50  Mg     2.1     07-11      proBNP:   Lipid Profile:   HgA1c:   TSH:

## 2023-07-11 NOTE — PROGRESS NOTE ADULT - NSPROGADDITIONALINFOA_GEN_ALL_CORE
-Plan discussed with pt/team.  Contact info: [phone] (24/7). pager [phone]  [employer] on [place]  Teams on M-T-W-F. Unavailable Thu/Weekends/Holidays  Assessed pt/labs/meds and discussed plan of care with primary team  Adjusting insulin  Discharge plan  Follow up care -Plan discussed with pt/team. Primary team aware of LLE weakness> neuro consult  Contact info: [phone] (24/7). pager [phone]  [place] on [place]  Teams on M-T-W-F. Unavailable Thu/Weekends/Holidays  Assessed pt/labs/meds and discussed plan of care with primary team  Adjusting insulin  Discharge plan  Follow up care

## 2023-07-11 NOTE — PROGRESS NOTE ADULT - SUBJECTIVE AND OBJECTIVE BOX
Interventional Cardiology Post Cath Progress Note:                Subjective:   Patient feels well- no current complaints- Denies chest pain, shortness of breath. Denies pain, numbness, tingling or swelling around (groin) access site    Tele 24hrs: SR 60-70 no ectopy       MEDICATIONS  (STANDING):  albuterol/ipratropium for Nebulization 3 milliLiter(s) Nebulizer every 6 hours  aspirin enteric coated 81 milliGRAM(s) Oral daily  atorvastatin 80 milliGRAM(s) Oral at bedtime  clopidogrel Tablet 75 milliGRAM(s) Oral daily  dextrose 5%. 1000 milliLiter(s) (100 mL/Hr) IV Continuous <Continuous>  dextrose 5%. 1000 milliLiter(s) (50 mL/Hr) IV Continuous <Continuous>  dextrose 50% Injectable 12.5 Gram(s) IV Push once  dextrose 50% Injectable 25 Gram(s) IV Push once  dextrose 50% Injectable 25 Gram(s) IV Push once  glucagon  Injectable 1 milliGRAM(s) IntraMuscular once  heparin   Injectable 5000 Unit(s) SubCutaneous every 12 hours  insulin glargine Injectable (LANTUS) 18 Unit(s) SubCutaneous at bedtime  insulin lispro (ADMELOG) corrective regimen sliding scale   SubCutaneous at bedtime  insulin lispro (ADMELOG) corrective regimen sliding scale   SubCutaneous three times a day before meals  insulin lispro Injectable (ADMELOG) 5 Unit(s) SubCutaneous three times a day before meals  metoprolol succinate ER 50 milliGRAM(s) Oral daily  sacubitril 49 mG/valsartan 51 mG 1 Tablet(s) Oral two times a day    MEDICATIONS  (PRN):  dextrose Oral Gel 15 Gram(s) Oral once PRN Blood Glucose LESS THAN 70 milliGRAM(s)/deciliter  guaiFENesin Oral Liquid (Sugar-Free) 100 milliGRAM(s) Oral every 6 hours PRN Cough      Objective:  Vital Signs Last 24 Hrs  T(C): 36.3 (11 Jul 2023 10:15), Max: 36.9 (10 Jul 2023 16:30)  T(F): 97.3 (11 Jul 2023 10:15), Max: 98.4 (10 Jul 2023 16:30)  HR: 61 (11 Jul 2023 10:15) (59 - 70)  BP: 107/64 (11 Jul 2023 10:15) (107/64 - 162/80)  BP(mean): --  RR: 18 (11 Jul 2023 10:15) (16 - 20)  SpO2: 97% (11 Jul 2023 10:15) (96% - 98%)    Parameters below as of 11 Jul 2023 10:15  Patient On (Oxygen Delivery Method): room air        07-10-23 @ 07:01  -  07-11-23 @ 07:00  --------------------------------------------------------  IN: 1440 mL / OUT: 500 mL / NET: 940 mL    07-11-23 @ 07:01  -  07-11-23 @ 11:31  --------------------------------------------------------  IN: 240 mL / OUT: 0 mL / NET: 240 mL                              13.9   8.07  )-----------( 251      ( 11 Jul 2023 09:48 )             43.5     07-11    135  |  102  |  18  ----------------------------<  134<H>  4.6   |  23  |  0.80    Ca    9.6      11 Jul 2023 09:50  Mg     2.1     07-11        Urinalysis Basic - ( 11 Jul 2023 09:50 )    Color: x / Appearance: x / SG: x / pH: x  Gluc: 134 mg/dL / Ketone: x  / Bili: x / Urobili: x   Blood: x / Protein: x / Nitrite: x   Leuk Esterase: x / RBC: x / WBC x   Sq Epi: x / Non Sq Epi: x / Bacteria: x      Physical Exam:  No apparent distress, alert and oriented times three, appropriate affect  JVD is not elevated, supple  Clear to auscultation with no wheezing, ronchi or crackles  Regular rate and rhythm with no murmur, rub or gallop  Positive bowel sounds, soft, non-tender, non-distended, no masses/guarding or rebound tenderness  right) groin: Soft, non tender, no bleeding or hematoma, clean/dry/intact- RLE/LLE +2 (palpable femoral pulse/audible by doppler/absent)  No clubbing, cyanosis or edema                                                          Interventional Cardiology Post Cath Progress Note:                                                        [place] language line  used: [id number] ( [email or web address])                Subjective:   Patient feels well- no current complaints- Denies chest pain, shortness of breath. Denies pain, numbness, tingling or swelling around (groin) access site, Patient noted slight weakness of his LLE when walking.     Tele 24hrs: SR 60-70 no ectopy       MEDICATIONS  (STANDING):  albuterol/ipratropium for Nebulization 3 milliLiter(s) Nebulizer every 6 hours  aspirin enteric coated 81 milliGRAM(s) Oral daily  atorvastatin 80 milliGRAM(s) Oral at bedtime  clopidogrel Tablet 75 milliGRAM(s) Oral daily  dextrose 5%. 1000 milliLiter(s) (100 mL/Hr) IV Continuous <Continuous>  dextrose 5%. 1000 milliLiter(s) (50 mL/Hr) IV Continuous <Continuous>  dextrose 50% Injectable 12.5 Gram(s) IV Push once  dextrose 50% Injectable 25 Gram(s) IV Push once  dextrose 50% Injectable 25 Gram(s) IV Push once  glucagon  Injectable 1 milliGRAM(s) IntraMuscular once  heparin   Injectable 5000 Unit(s) SubCutaneous every 12 hours  insulin glargine Injectable (LANTUS) 18 Unit(s) SubCutaneous at bedtime  insulin lispro (ADMELOG) corrective regimen sliding scale   SubCutaneous at bedtime  insulin lispro (ADMELOG) corrective regimen sliding scale   SubCutaneous three times a day before meals  insulin lispro Injectable (ADMELOG) 5 Unit(s) SubCutaneous three times a day before meals  metoprolol succinate ER 50 milliGRAM(s) Oral daily  sacubitril 49 mG/valsartan 51 mG 1 Tablet(s) Oral two times a day    MEDICATIONS  (PRN):  dextrose Oral Gel 15 Gram(s) Oral once PRN Blood Glucose LESS THAN 70 milliGRAM(s)/deciliter  guaiFENesin Oral Liquid (Sugar-Free) 100 milliGRAM(s) Oral every 6 hours PRN Cough      Objective:  Vital Signs Last 24 Hrs  T(C): 36.3 (11 Jul 2023 10:15), Max: 36.9 (10 Jul 2023 16:30)  T(F): 97.3 (11 Jul 2023 10:15), Max: 98.4 (10 Jul 2023 16:30)  HR: 61 (11 Jul 2023 10:15) (59 - 70)  BP: 107/64 (11 Jul 2023 10:15) (107/64 - 162/80)  BP(mean): --  RR: 18 (11 Jul 2023 10:15) (16 - 20)  SpO2: 97% (11 Jul 2023 10:15) (96% - 98%)    Parameters below as of 11 Jul 2023 10:15  Patient On (Oxygen Delivery Method): room air        07-10-23 @ 07:01  -  07-11-23 @ 07:00  --------------------------------------------------------  IN: 1440 mL / OUT: 500 mL / NET: 940 mL    07-11-23 @ 07:01  -  07-11-23 @ 11:31  --------------------------------------------------------  IN: 240 mL / OUT: 0 mL / NET: 240 mL                              13.9   8.07  )-----------( 251      ( 11 Jul 2023 09:48 )             43.5     07-11    135  |  102  |  18  ----------------------------<  134<H>  4.6   |  23  |  0.80    Ca    9.6      11 Jul 2023 09:50  Mg     2.1     07-11        Urinalysis Basic - ( 11 Jul 2023 09:50 )    Color: x / Appearance: x / SG: x / pH: x  Gluc: 134 mg/dL / Ketone: x  / Bili: x / Urobili: x   Blood: x / Protein: x / Nitrite: x   Leuk Esterase: x / RBC: x / WBC x   Sq Epi: x / Non Sq Epi: x / Bacteria: x      Physical Exam:  No apparent distress, alert and oriented times three, appropriate affect  JVD is not elevated, supple  Clear to auscultation with no wheezing, ronchi or crackles  Regular rate and rhythm with no murmur, rub or gallop  Positive bowel sounds, soft, non-tender, non-distended, no masses/guarding or rebound tenderness  right) groin: Soft, non tender, no bleeding or hematoma, clean/dry/intact- RLE/LLE +2 (palpable femoral pulse/audible by doppler/absent)  No clubbing, cyanosis or edema

## 2023-07-11 NOTE — PROGRESS NOTE ADULT - SUBJECTIVE AND OBJECTIVE BOX
SUBJ:    CVS: Denies palpitations Chest pain;  Neuro: Denies dizziness; Pulm: denies dyspnea, cough  tele sinus  c.o of left leg imbalance when walking    family at bed side and phone      Home Medications:  amLODIPine 2.5 mg oral tablet: 1 tab(s) orally once a day (2023 17:58)  aspirin 81 mg oral delayed release tablet: 1 tab(s) orally once a day (2023 17:58)  losartan 100 mg oral tablet: 1 tab(s) orally once a day (2023 17:58)  metFORMIN 850 mg oral tablet: 1 tab(s) orally 2 times a day (2023 17:58)  metoprolol 50m tablet orally once a day (2023 17:58)      MEDICATIONS  (STANDING):  albuterol/ipratropium for Nebulization 3 milliLiter(s) Nebulizer every 6 hours  aspirin enteric coated 81 milliGRAM(s) Oral daily  atorvastatin 80 milliGRAM(s) Oral at bedtime  clopidogrel Tablet 75 milliGRAM(s) Oral daily  dextrose 5%. 1000 milliLiter(s) (100 mL/Hr) IV Continuous <Continuous>  dextrose 5%. 1000 milliLiter(s) (50 mL/Hr) IV Continuous <Continuous>  dextrose 50% Injectable 25 Gram(s) IV Push once  dextrose 50% Injectable 25 Gram(s) IV Push once  dextrose 50% Injectable 12.5 Gram(s) IV Push once  glucagon  Injectable 1 milliGRAM(s) IntraMuscular once  heparin   Injectable 5000 Unit(s) SubCutaneous every 12 hours  insulin glargine Injectable (LANTUS) 18 Unit(s) SubCutaneous at bedtime  insulin lispro (ADMELOG) corrective regimen sliding scale   SubCutaneous at bedtime  insulin lispro (ADMELOG) corrective regimen sliding scale   SubCutaneous three times a day before meals  insulin lispro Injectable (ADMELOG) 5 Unit(s) SubCutaneous three times a day before meals  metoprolol succinate ER 50 milliGRAM(s) Oral daily  sacubitril 49 mG/valsartan 51 mG 1 Tablet(s) Oral two times a day    MEDICATIONS  (PRN):  dextrose Oral Gel 15 Gram(s) Oral once PRN Blood Glucose LESS THAN 70 milliGRAM(s)/deciliter  guaiFENesin Oral Liquid (Sugar-Free) 100 milliGRAM(s) Oral every 6 hours PRN Cough      Vital Signs Last 24 Hrs  T(C): 36.3 (2023 13:11), Max: 36.9 (10 Jul 2023 16:30)  T(F): 97.4 (2023 13:11), Max: 98.4 (10 Jul 2023 16:30)  HR: 60 (2023 13:11) (60 - 70)  BP: 125/70 (2023 13:11) (107/64 - 161/94)  BP(mean): --  RR: 18 (2023 13:11) (18 - 20)  SpO2: 96% (2023 13:11) (96% - 98%)    Parameters below as of 2023 13:11  Patient On (Oxygen Delivery Method): room air        REVIEW OF SYSTEMS:  As per HPI, otherwise unremarkable.     PHYSICAL EXAM:  Constitutional/Appearance: Normal, Well-developed  HEENT:   Normal oral mucosa, no drainage or redness, supple neck  Lymphatic: No lymphadenopathy  Cardiovascular: Normal S1 S2, No edema, RRR  Respiratory: Lungs clear to auscultation, respirations non-labored  Psychiatry: A & O x 3, appropriate affect.   Gastrointestinal:  Soft, Non-tender, no distention  Skin: No rashes, No ecchymoses, No cyanosis	  Neurologic: Non-focal, Alert and oriented x 3  Extremities: Normal range of motion  Vascular: Peripheral pulses palpable 2+ bilaterally (radial)    TELEMETRY: sinus      LABS:  CBC Full  -  ( 2023 09:48 )  WBC Count : 8.07 K/uL  RBC Count : 4.64 M/uL  Hemoglobin : 13.9 g/dL  Hematocrit : 43.5 %  Platelet Count - Automated : 251 K/uL  Mean Cell Volume : 93.8 fl  Mean Cell Hemoglobin : 30.0 pg  Mean Cell Hemoglobin Concentration : 32.0 gm/dL          135  |  102  |  18  ----------------------------<  134<H>  4.6   |  23  |  0.80    Ca    9.6      2023 09:50  Mg     2.1           IMPRESSION AND PLAN:  73 y.o with a PMH of CAD s/p 3 stents, HTN, T2DM, admitted for pneumonia, cardiology consulted for ST depression and LVH noted on EKG.  EF 30-35%  ACS ruled out EF down to 30s, LHC showed TVD. CP free, euvolemic. s/p L Leg weakness      CHF   TTE w/ EF 30-35%.   - euvolemic on exam  - Continue entresto 49/51mg bid (will see how pt will obtain after d/c home given cost)  - Continue metop succinate 50mg daily  - can start aldactone 25 mg po qd for routine CHF care.--> follow K    CAD  - continue Aspirin, Plavix  atorva 80.  - Cath 7/10 with Complex PCI with impella with Adriana Gross-->s/p PCT to p OM, PCI to mRCA  - right groin clean no hemarona    L Leg imbalance post cath   - Head CT , Neuro called  - Physical tx  recommended       Plan discussed earlier with ANGELINE Burr MD  Cardiology Attending

## 2023-07-11 NOTE — PROVIDER CONTACT NOTE (OTHER) - REASON
remote tele event: PATS for 2.4 sec, 
Pt was vomiting and complaining of nausea
PT stated that he is having left leg weakness

## 2023-07-11 NOTE — PROVIDER CONTACT NOTE (OTHER) - ACTION/TREATMENT ORDERED:
PA to come assess pt
As per CHARLINE Zelaya, no intervention needed.
LOTUS Corbett notified and at bedside to assess pt. Ordered Zofran for the nausea and crackers and ginger ale. Pt BP was 180/98, then 171/92 LOTUS Corbett aware, no interventions at this time

## 2023-07-11 NOTE — PROGRESS NOTE ADULT - ASSESSMENT
Assessment/Plan:  73 y.o with a PMH of CAD s/p 3 stents, HTN, T2DM, admitted for pneumonia, cardiology consulted for ST depression and LVH noted on EKG. ACS ruled out EF down to 30s, LHC showed TVD.  6/29 s/p diagnostic cath pLAD  w/ R to L collateral, Cx severe diffuse disease, pRCA 90%, RPL  via RRA- CTS eval for CABG, CTS consulted w/ no role for CTS. cMRI results show viability for complex PCI due to available cardiac tissue to revascularize. for complex PCI.  7/10: s/p X1 JESS to proxOM and X1 JESS to mRCA via RFA- perclose in place  - Groin site stable.   - Continue DAPT (aspirin 81mg and clopidogrel 75mg)  - Continue atorvastatin  - pt c/o of Left LE weakness, Neuro at bedside, to order a CT head for further eval of CVA  - Recommend a heart healthy diet which includes a variety of fruits and vegetables, whole grains, low fat dairy products, legumes and skinless poulty and fish; food prepared with little or no salt and minimize processed foods  - Avoid using NSAIDs  (Aleve, Motrin, ibuprofen, naproxen) while on DAPT, please utilize Tylenol for pain control (not to exceed 4gm in 24 hours)  - Care per primary team  - f/u at [facility] [phone] in 2 weeks as pt has no primary cards  Please check [email or web address] password [email or web address] for cardiology service schedule and contact information via TEAMS.

## 2023-07-11 NOTE — CONSULT NOTE ADULT - SUBJECTIVE AND OBJECTIVE BOX
Neurology - Consult Note    -  Spectra: [phone] ([facility]), [phone] ([facility])  -  Sami language line  used: [phone]    HPI: Patient ALEXUS MOORE is a 73y (1950) RH man with a PMHx significant for DM, HTN, CAD s/p 3 stents who presented with cough productive of green sputum with concerns for pneumonia, found to have ST changes on EKG with concerning echocardiogram findings for which patient underwent left heart catheterization on 6/29/2023. Cath demonstrated 3x CAD for which patient was planned for complex PCI which occurred on 7/10/2023. Neurology is consulted for LLE weakness. LKW 11 AM 7/10/2023, patient then went for his PCI around noon 7/10/2023 and was recommended to limit lower extremity movement given recent procedure. Patient first noted the symptoms when attempting to use the restroom at 5 AM today 7/11/2023. Patient noted slight weakness of his LLE when walking. Otherwise denies headache, nausea, vomiting, dizziness, fevers, chest pain, sensory changes, vision changes, or problems at his cath site. Prior to admission patient was on aspirin daily, currently he is on aspirin and Plavix with heparin SubQ for DVT prophylaxis.      Review of Systems:    All other review of systems is negative unless indicated above.    Allergies:  No Known Allergies      PMHx/PSHx/Family Hx: As above, otherwise see below   HTN (hypertension)    DM (diabetes mellitus)        Social Hx:  No current use of tobacco, alcohol, or illicit drugs    Medications:  MEDICATIONS  (STANDING):  albuterol/ipratropium for Nebulization 3 milliLiter(s) Nebulizer every 6 hours  aspirin enteric coated 81 milliGRAM(s) Oral daily  atorvastatin 80 milliGRAM(s) Oral at bedtime  clopidogrel Tablet 75 milliGRAM(s) Oral daily  dextrose 5%. 1000 milliLiter(s) (100 mL/Hr) IV Continuous <Continuous>  dextrose 5%. 1000 milliLiter(s) (50 mL/Hr) IV Continuous <Continuous>  dextrose 50% Injectable 12.5 Gram(s) IV Push once  dextrose 50% Injectable 25 Gram(s) IV Push once  dextrose 50% Injectable 25 Gram(s) IV Push once  glucagon  Injectable 1 milliGRAM(s) IntraMuscular once  heparin   Injectable 5000 Unit(s) SubCutaneous every 12 hours  insulin glargine Injectable (LANTUS) 18 Unit(s) SubCutaneous at bedtime  insulin lispro (ADMELOG) corrective regimen sliding scale   SubCutaneous at bedtime  insulin lispro (ADMELOG) corrective regimen sliding scale   SubCutaneous three times a day before meals  insulin lispro Injectable (ADMELOG) 5 Unit(s) SubCutaneous three times a day before meals  metoprolol succinate ER 50 milliGRAM(s) Oral daily  sacubitril 49 mG/valsartan 51 mG 1 Tablet(s) Oral two times a day    MEDICATIONS  (PRN):  dextrose Oral Gel 15 Gram(s) Oral once PRN Blood Glucose LESS THAN 70 milliGRAM(s)/deciliter  guaiFENesin Oral Liquid (Sugar-Free) 100 milliGRAM(s) Oral every 6 hours PRN Cough      Vitals:  T(C): 36.3 (07-11-23 @ 10:15), Max: 36.9 (07-10-23 @ 16:30)  HR: 61 (07-11-23 @ 10:15) (59 - 70)  BP: 107/64 (07-11-23 @ 10:15) (107/64 - 162/80)  RR: 18 (07-11-23 @ 10:15) (16 - 20)  SpO2: 97% (07-11-23 @ 10:15) (96% - 98%)    Physical Examination:  General - NAD  Cardiovascular - Peripheral pulses palpable, no edema  Respiratory - Breathing comfortably     Neurologic Exam:  Mental status - Awake, Alert, Oriented to person, place, and time. Speech fluent, repetition and naming intact. Follows simple and complex commands. Attention/concentration, recent and remote memory (including registration and recall), and fund of knowledge intact    Cranial nerves - PERRL, VFF, EOMI, face sensation (V1-V3) intact b/l, facial strength intact without asymmetry b/l, hearing intact b/l, palate with symmetric elevation, trapezius 5/5 strength b/l, tongue midline on protrusion with full lateral movement    Motor - Normal bulk and tone throughout. No pronator drift.  Strength testing            Deltoid      Biceps      Triceps     Wrist Extension    Wrist Flexion     Interossei         R            5                 5               5                     5                     5                   5                 5  L             5                 5               5                     5                     5                   5                 5              Hip Flexion        Knee Flexion    Knee Extension    Dorsiflexion    Plantar Flexion  R              5                       5                         5                   5                            5                          L              4+                     5                         5                   5                            5                 Sensation - Light touch intact  DTR's -             Biceps      Triceps     Brachioradialis      Patellar    Ankle    Toes/plantar response  R             2+             2+                  2+                2+          2+                 Down  L              2+             2+                 2+                 2+         2+                 Down    Coordination - Finger to Nose intac    Gait and station - Gait testing deferred due to recent procedure    Labs:                        13.9   8.07  )-----------( 251      ( 11 Jul 2023 09:48 )             43.5     07-11    135  |  102  |  18  ----------------------------<  134<H>  4.6   |  23  |  0.80    Ca    9.6      11 Jul 2023 09:50  Mg     2.1     07-11      CAPILLARY BLOOD GLUCOSE      POCT Blood Glucose.: 116 mg/dL (11 Jul 2023 09:09)          CSF:                  Radiology:    < from: [facility] Duplex Carotid, Bilat (06.30.23 @ 09:50) >    ACC: [id number] EXAM:  CAROTID DUPLEX BILATERAL   ORDERED BY: ALYX LACEY     PROCEDURE DATE:  06/30/2023          INTERPRETATION:  CLINICAL INFORMATION: Coronary artery disease,   preoperative exam.    COMPARISON: None available.    TECHNIQUE: Grayscale, color and spectral Doppler examination of both   carotid arteries was performed.    FINDINGS:    Mild calcified plaque is seen in both carotid bifurcations.    Peak systolic velocities are as follows:    RIGHT:  PROX CCA = 84  DIST CCA = 74  PROX ICA = 53  DIST ICA = 35  ECA = 104    LEFT:  PROX CCA = 71  DIST CCA = 71  PROX ICA = 57  DIST ICA = 48  ECA = 98    Antegrade flow is noted within both vertebral arteries.    IMPRESSION: No significant hemodynamic stenosis of either carotid artery.    Measurement of carotid stenosis is based on velocity parameters that   correlate the residual internal carotid diameter with that of the more   distal vessel in accordance with a method such as the [place] American   Symptomatic Carotid Endarterectomy Trial (NASCET).    --- End of Report ---           ASHWIN RAO MD; Resident Radiologist  This document has been electronically signed.  SARAHY NUÑEZ MD; Attending Radiologist  This document has been electronically signed. Jun 30 2023 11:14AM    < end of copied text >

## 2023-07-11 NOTE — PROGRESS NOTE ADULT - ASSESSMENT
74 yo M w/h/o uncontrolled and untreated T2DM (A1C10%) formerly on Dapagliflozin and Sitagliptin > stopped meds for 4 months PTA because pharmacy in  "[facility]" didn't have them. Stopped Metformin due to large weight lost reported by pt. DM c/b CAD > s/p 3 stents. Also h/o HTN, presents with SOB, found to have acute bronchitis, treated with antibiotic. Patient also found to have EF of 30%, s/p LHC on 6/29 which showed TVD>now s/p PCI 7/10.23.  Endocrinology following for diabetes management. Tolerating POs with BG mostly at goal while on present insulin doses. No hypoglycemia.  Will preventively decrease basal insulin to keep BG Goal 100-180mg/dl     Pt is uninsured and will need to assess what DM pt can afford. Might benefit from coupons until pt goes back to [facility]. Pt states he si not going back to [facility] for now.     As per pt, he was on Sitagliptin 50 mg daily and Dapagliflozin 10 mg daily until 4 months ago in [facility] but pharmacy didn't carry meds.               74 yo M w/h/o uncontrolled and untreated T2DM (A1C10%) formerly on Dapagliflozin and Sitagliptin > stopped meds for 4 months PTA because pharmacy in  "[facility]" didn't have them. Stopped Metformin due to large weight lost reported by pt. DM c/b CAD > s/p 3 stents. Also h/o HTN, presents with SOB, found to have acute bronchitis, treated with antibiotic. Patient also found to have EF of 30%, s/p LHC on 6/29 which showed TVD>now s/p  X1 JESS to proxOM and X1 JESS to mRCA via RFA  7/10.23.  Endocrinology following for diabetes management. Tolerating POs with BG mostly at goal while on present insulin doses. No hypoglycemia.  Will preventively decrease basal insulin to keep BG Goal 100-180mg/dl     Pt is uninsured and will need to assess what DM pt can afford. Might benefit from coupons until pt goes back to [facility]. Pt states he si not going back to [facility] for now.     As per pt, he was on Sitagliptin 50 mg daily and Dapagliflozin 10 mg daily until 4 months ago in [facility] but pharmacy didn't carry meds.

## 2023-07-11 NOTE — CHART NOTE - NSCHARTNOTEFT_GEN_A_CORE
Informed by radiologist Dr. Cardenas CT Head and CTA Head/Neck remarkable for: "mild volume loss, microvascular disease, no acute hemorrhage or midline shift. Chronic lacunar infarct left basal ganglia. CTA COW:  Multiple mild to moderate focal stenoses bilateral MCA, left PCA and right V4 segment of vertebral artery. No LVO."     Discussed resulted with neurology resident Dr. Rao who recommends further evaluation with MRI brain w/o. Attending Dr. Hernandez notified and in agreement with plan.    Tessa Gomez PA-C  Dept of Medicine Informed by radiologist Dr. Cardenas CT Head and CTA Head/Neck remarkable for: "mild volume loss, microvascular disease, no acute hemorrhage or midline shift. Chronic lacunar infarct left basal ganglia. CTA COW:  Multiple mild to moderate focal stenoses bilateral MCA, left PCA and right V4 segment of vertebral artery. No LVO."     Discussed results with neurology resident Dr. Rao who recommends further evaluation with MRI brain w/o and c/w ASA and Plavix. Attending Dr. Hernandez notified and in agreement with plan.    Tessa Gomez PA-C  Dept of Medicine

## 2023-07-12 LAB
ANION GAP SERPL CALC-SCNC: 10 MMOL/L — SIGNIFICANT CHANGE UP (ref 5–17)
BUN SERPL-MCNC: 21 MG/DL — SIGNIFICANT CHANGE UP (ref 7–23)
CALCIUM SERPL-MCNC: 9.7 MG/DL — SIGNIFICANT CHANGE UP (ref 8.4–10.5)
CHLORIDE SERPL-SCNC: 103 MMOL/L — SIGNIFICANT CHANGE UP (ref 96–108)
CO2 SERPL-SCNC: 21 MMOL/L — LOW (ref 22–31)
CREAT SERPL-MCNC: 0.87 MG/DL — SIGNIFICANT CHANGE UP (ref 0.5–1.3)
EGFR: 90 ML/MIN/1.73M2 — SIGNIFICANT CHANGE UP
GLUCOSE BLDC GLUCOMTR-MCNC: 120 MG/DL — HIGH (ref 70–99)
GLUCOSE BLDC GLUCOMTR-MCNC: 127 MG/DL — HIGH (ref 70–99)
GLUCOSE BLDC GLUCOMTR-MCNC: 192 MG/DL — HIGH (ref 70–99)
GLUCOSE BLDC GLUCOMTR-MCNC: 98 MG/DL — SIGNIFICANT CHANGE UP (ref 70–99)
GLUCOSE SERPL-MCNC: 130 MG/DL — HIGH (ref 70–99)
POTASSIUM SERPL-MCNC: 4.7 MMOL/L — SIGNIFICANT CHANGE UP (ref 3.5–5.3)
POTASSIUM SERPL-SCNC: 4.7 MMOL/L — SIGNIFICANT CHANGE UP (ref 3.5–5.3)
SODIUM SERPL-SCNC: 134 MMOL/L — LOW (ref 135–145)

## 2023-07-12 PROCEDURE — 99232 SBSQ HOSP IP/OBS MODERATE 35: CPT

## 2023-07-12 PROCEDURE — 99255 IP/OBS CONSLTJ NEW/EST HI 80: CPT

## 2023-07-12 RX ADMIN — SACUBITRIL AND VALSARTAN 1 TABLET(S): 24; 26 TABLET, FILM COATED ORAL at 17:44

## 2023-07-12 RX ADMIN — Medication 81 MILLIGRAM(S): at 11:11

## 2023-07-12 RX ADMIN — Medication 50 MILLIGRAM(S): at 07:55

## 2023-07-12 RX ADMIN — CLOPIDOGREL BISULFATE 75 MILLIGRAM(S): 75 TABLET, FILM COATED ORAL at 11:11

## 2023-07-12 RX ADMIN — Medication 3 MILLILITER(S): at 17:44

## 2023-07-12 RX ADMIN — INSULIN GLARGINE 16 UNIT(S): 100 INJECTION, SOLUTION SUBCUTANEOUS at 22:12

## 2023-07-12 RX ADMIN — Medication 3 MILLILITER(S): at 11:12

## 2023-07-12 RX ADMIN — SACUBITRIL AND VALSARTAN 1 TABLET(S): 24; 26 TABLET, FILM COATED ORAL at 06:00

## 2023-07-12 RX ADMIN — Medication 5 UNIT(S): at 08:52

## 2023-07-12 RX ADMIN — Medication 5 UNIT(S): at 17:45

## 2023-07-12 RX ADMIN — HEPARIN SODIUM 5000 UNIT(S): 5000 INJECTION INTRAVENOUS; SUBCUTANEOUS at 18:07

## 2023-07-12 RX ADMIN — HEPARIN SODIUM 5000 UNIT(S): 5000 INJECTION INTRAVENOUS; SUBCUTANEOUS at 06:00

## 2023-07-12 RX ADMIN — Medication 3 MILLILITER(S): at 23:55

## 2023-07-12 RX ADMIN — Medication 3 MILLILITER(S): at 00:28

## 2023-07-12 RX ADMIN — Medication 3 MILLILITER(S): at 06:00

## 2023-07-12 RX ADMIN — Medication 5 UNIT(S): at 12:54

## 2023-07-12 RX ADMIN — ATORVASTATIN CALCIUM 80 MILLIGRAM(S): 80 TABLET, FILM COATED ORAL at 22:12

## 2023-07-12 RX ADMIN — Medication 1: at 12:54

## 2023-07-12 NOTE — PROGRESS NOTE ADULT - ASSESSMENT
74 yo M w/h/o uncontrolled and untreated T2DM (A1C10%) formerly on Dapagliflozin and Sitagliptin > stopped meds for 4 months PTA because pharmacy in  "[facility]" didn't have them. Stopped Metformin due to large weight lost reported by pt. DM c/b CAD > s/p 3 stents. Also h/o HTN, presents with SOB, found to have acute bronchitis, treated with antibiotic. Patient also found to have EF of 30%, s/p LHC on 6/29 which showed TVD>now s/p  X1 JESS to proxOM and X1 JESS to mRCA via RFA  7/10/23. Endocrinology following for diabetes management. Tolerating POs with BG mostly at goal while on present insulin doses. No hypoglycemia.  Will c/w present insulin doses for now  to keep BG Goal 100-180mg/dl     Pt is uninsured and will need to assess what DM pt can afford. Might benefit from coupons until pt goes back to [facility]. Pt states he si not going back to [facility] for now. Gave pt [facility] [place] form to see if pt qualifies for insulin upon discharge. Spoke to RN to give form to  to f/u     As per pt, he was on Sitagliptin 50 mg daily and Dapagliflozin 10 mg daily until 4 months ago in [facility] but pharmacy didn't carry meds.

## 2023-07-12 NOTE — PROGRESS NOTE ADULT - ASSESSMENT
HPI:  73M PMH DM, HTN, CAD s/p 3 stents p/w cough productive of green sputum.    Pt says the cough started 2 weeks ago. He came from [place] 1 month ago.   Has some SOB when coughing, feels like coughing more when laying supine. Denies fever, chills, CP, abdominal pain, urinary symptoms, diarrhea, weight gain or weight loss, LE edema. (26 Jun 2023 21:03)        s/p cardiac cath on 7/11 with JESS to LAD, staged PCI on 7/12 is pending.      tele  SR in 80's,with no  events overnight?        Radial:   Right wrist stable w/ bleeding or hematoma; site soft, non tender.  Right radial pulse palpable +2.  Denies chest pain, denies right wrist/arm/hand:  pain, numbness, or tingling       - Reviewed and reinforced with patient:  wound care instructions, activities dos and donts, medication compliance specifically antiplatelet therapy given stent/s.    - Patient aware to take DAPT  as prescribed and DO NOT STOP taking without consulting cardiologist first or STENT/s WILL CLOSE  - Reviewed and reinforced with patient:  site complications ( eg: bleeding, excruciating pain at the procedural site, large sweliing-golf ball size-  extremity numbness, tingling, temperature change), or CHEST PAIN; pt aware that if any of those occur he/she must call cardiologist IMMEDIATELY or 911 or go to nearest emergency room   - Reviewed and reinforced a heart healthy diet, Smoking Cessation  - Patient verbalizes understanding of ALL OF THE ABOVE, and gives positive feedback       cont DAPT ( ASA & Brilinta)  cont all current meds  cont statin   please make sure DAPT is prescibed to pt's preferred pharmacy on dc   Keep Mg >2 K >4  f/u appt in 2 weeks post dc with oupt cardiologist Dr. Sebastian Mejia  for all  general cardilogy questions please contact patient's primary cards team   all other care as per primary medicine  team     MIKAYLA Almendarez,  Invasive Cardiology

## 2023-07-12 NOTE — PROGRESS NOTE ADULT - SUBJECTIVE AND OBJECTIVE BOX
DIABETES FOLLOW UP NOTE: Saw pt earlier today    Chief Complaint: Endocrine consult requested for management of T2DM    INTERVAL HX: Pt stable, reports tolerating POs with BG mostly at goal while on present insulin doses (100s). No hypoglycemia. Denies any CP/SOB at time of visit. S/p s/p X1 JESS to proxOM and X1 JESS to mRCA via RFA 7/10. Pt still c/o LLE weakness and now numbness> unable to walk with that leg. Primary team aware. Per wife pt going for doppler today      Review of Systems:  General: As above  Cardiovascular: No chest pain, palpitations  Respiratory: No SOB, no cough  GI: No nausea, vomiting, abdominal pain  Endocrine: No polyuria, polydipsia or S&Sx of hypoglycemia    Allergies    No Known Allergies    Intolerances      MEDICATIONS:  atorvastatin 80 milliGRAM(s) Oral at bedtime  insulin glargine Injectable (LANTUS) 16 Unit(s) SubCutaneous at bedtime  insulin lispro (ADMELOG) corrective regimen sliding scale   SubCutaneous at bedtime  insulin lispro (ADMELOG) corrective regimen sliding scale   SubCutaneous three times a day before meals  insulin lispro Injectable (ADMELOG) 5 Unit(s) SubCutaneous three times a day before meals      PHYSICAL EXAM:  VITALS: T(C): 36.8 (07-12-23 @ 13:11)  T(F): 98.2 (07-12-23 @ 13:11), Max: 98.4 (07-12-23 @ 09:22)  HR: 65 (07-12-23 @ 13:11) (57 - 65)  BP: 100/63 (07-12-23 @ 13:11) (100/63 - 144/85)  RR:  (17 - 18)  SpO2:  (95% - 98%)  Wt(kg): --  GENERAL: Male sitting in chair in NAD. Wife at bedside  Abdomen: Soft, nontender, non distended, central adiposity  Extremities: Warm, no edema in all 4 exts R inguinal area with some bruising> no bleeding, no tender  NEURO: A&O X3. Encounter done in [place]      LABS:  POCT Blood Glucose.: 192 mg/dL (07-12-23 @ 12:50)  POCT Blood Glucose.: 120 mg/dL (07-12-23 @ 08:49)  POCT Blood Glucose.: 175 mg/dL (07-11-23 @ 21:40)  POCT Blood Glucose.: 110 mg/dL (07-11-23 @ 17:23)  POCT Blood Glucose.: 94 mg/dL (07-11-23 @ 12:53)  POCT Blood Glucose.: 116 mg/dL (07-11-23 @ 09:09)  POCT Blood Glucose.: 166 mg/dL (07-10-23 @ 21:40)  POCT Blood Glucose.: 160 mg/dL (07-10-23 @ 17:56)  POCT Blood Glucose.: 155 mg/dL (07-10-23 @ 17:38)  POCT Blood Glucose.: 143 mg/dL (07-10-23 @ 15:11)  POCT Blood Glucose.: 131 mg/dL (07-10-23 @ 09:06)  POCT Blood Glucose.: 150 mg/dL (07-09-23 @ 22:11)  POCT Blood Glucose.: 120 mg/dL (07-09-23 @ 17:32)                            13.9   8.07  )-----------( 251      ( 11 Jul 2023 09:48 )             43.5       07-12    134<L>  |  103  |  21  ----------------------------<  130<H>  4.7   |  21<L>  |  0.87    eGFR: 90    Ca    9.7      07-12  Mg     2.1     07-11    Thyroid Function Tests:  06-29 @ 19:11 TSH 4.73 FreeT4 -- T3 66 Anti TPO -- Anti Thyroglobulin Ab -- TSI --  06-27 @ 07:41 TSH 5.56 FreeT4 -- T3 -- Anti TPO -- Anti Thyroglobulin Ab -- TSI --      A1C with Estimated Average Glucose Result: 10.0 % (06-27-23 @ 07:41)      Estimated Average Glucose: 240 mg/dL (06-27-23 @ 07:41)        06-27 Chol 183 Direct LDL -- LDL calculated 114<H> HDL 43 Trig 136

## 2023-07-12 NOTE — PROGRESS NOTE ADULT - SUBJECTIVE AND OBJECTIVE BOX
Follow-up Pulm Progress Note    No new respiratory events overnight.  Denies SOB/CP.     Medications:  MEDICATIONS  (STANDING):  albuterol/ipratropium for Nebulization 3 milliLiter(s) Nebulizer every 6 hours  aspirin enteric coated 81 milliGRAM(s) Oral daily  atorvastatin 80 milliGRAM(s) Oral at bedtime  clopidogrel Tablet 75 milliGRAM(s) Oral daily  dextrose 5%. 1000 milliLiter(s) (50 mL/Hr) IV Continuous <Continuous>  dextrose 5%. 1000 milliLiter(s) (100 mL/Hr) IV Continuous <Continuous>  dextrose 50% Injectable 12.5 Gram(s) IV Push once  dextrose 50% Injectable 25 Gram(s) IV Push once  dextrose 50% Injectable 25 Gram(s) IV Push once  glucagon  Injectable 1 milliGRAM(s) IntraMuscular once  heparin   Injectable 5000 Unit(s) SubCutaneous every 12 hours  insulin glargine Injectable (LANTUS) 16 Unit(s) SubCutaneous at bedtime  insulin lispro (ADMELOG) corrective regimen sliding scale   SubCutaneous at bedtime  insulin lispro (ADMELOG) corrective regimen sliding scale   SubCutaneous three times a day before meals  insulin lispro Injectable (ADMELOG) 5 Unit(s) SubCutaneous three times a day before meals  metoprolol succinate ER 50 milliGRAM(s) Oral daily  sacubitril 49 mG/valsartan 51 mG 1 Tablet(s) Oral two times a day    MEDICATIONS  (PRN):  dextrose Oral Gel 15 Gram(s) Oral once PRN Blood Glucose LESS THAN 70 milliGRAM(s)/deciliter  guaiFENesin Oral Liquid (Sugar-Free) 100 milliGRAM(s) Oral every 6 hours PRN Cough          Vital Signs Last 24 Hrs  T(C): 36.9 (12 Jul 2023 09:22), Max: 36.9 (12 Jul 2023 09:22)  T(F): 98.4 (12 Jul 2023 09:22), Max: 98.4 (12 Jul 2023 09:22)  HR: 57 (12 Jul 2023 09:22) (57 - 65)  BP: 133/73 (12 Jul 2023 09:22) (107/64 - 144/85)  BP(mean): --  RR: 18 (12 Jul 2023 09:22) (17 - 18)  SpO2: 97% (12 Jul 2023 09:22) (95% - 97%)    Parameters below as of 12 Jul 2023 09:22  Patient On (Oxygen Delivery Method): room air              07-11 @ 07:01  -  07-12 @ 07:00  --------------------------------------------------------  IN: 1320 mL / OUT: 300 mL / NET: 1020 mL          LABS:                        13.9   8.07  )-----------( 251      ( 11 Jul 2023 09:48 )             43.5     07-12    134<L>  |  103  |  21  ----------------------------<  130<H>  4.7   |  21<L>  |  0.87    Ca    9.7      12 Jul 2023 07:14  Mg     2.1     07-11            CAPILLARY BLOOD GLUCOSE      POCT Blood Glucose.: 120 mg/dL (12 Jul 2023 08:49)      Urinalysis Basic - ( 12 Jul 2023 07:14 )    Color: x / Appearance: x / SG: x / pH: x  Gluc: 130 mg/dL / Ketone: x  / Bili: x / Urobili: x   Blood: x / Protein: x / Nitrite: x   Leuk Esterase: x / RBC: x / WBC x   Sq Epi: x / Non Sq Epi: x / Bacteria: x                Physical Examination:  PULM: CTA bilaterally   CVS: S1, S2 heard        RADIOLOGY REVIEWED  CXR 7/3: grossly clear     CT chest: < from: CT Chest No Cont (06.27.23 @ 11:01) >  FINDINGS:    LYMPH NODES: No lymphadenopathy.    HEART/VASCULATURE: The heart is normal in size. Aortic and coronary   artery calcifications. No pericardial effusion. Bilateral partially   calcified pleural plaques compatible with asbestos exposure.    AIRWAYS/LUNGS/PLEURA: The central airways are patent. Bilateral lower   lobe dependent linear opacities. Right upper lobe subsolid nodule   measures 6 mm (3-47). Right upper lobe perifissural ground glass nodule   measures 5 mm (7-144, 3-60). Mild peribronchovascular groundglass in the   lateral segment of the right middle lobe. No pleural effusion or   pneumothorax.    UPPER ABDOMEN: Unremarkable.    BONES/SOFT TISSUES: Mild degenerative changes.    IMPRESSION:    Bilateral lower lobe dependent atelectasis.    Mild right middle lobe groundglass and small right upper lobe ground   glass nodules which maybe inflammatory. Recommend CT chest in 3 months   to see if they persist.    < end of copied text >      TTE: < from: TTE W or WO Ultrasound Enhancing Agent (06.27.23 @ 14:05) >  CONCLUSIONS:      1. Normal left ventricular cavity size. The left ventricular wall thickness is normal. The left ventricular systolic function is severely decreased with an ejection fraction visually estimated at 30 to 35 %. There are regional wall motion abnormalities No evidence of a thrombus in the left ventricle.   2. Multiple segmental abnormalities exist. See findings.   3. There is mild (grade 1) left ventricular diastolic dysfunction, with normal filling pressure.   4. Normal right ventricular cavity size, normal right ventricular wall thickness and normal right ventricular systolic function. The tricuspid annular plane systolic excursion (TAPSE) is 2.1 cm (normal >=1.7 cm).   5. The right atrium is normal.   6. No pericardial effusion seen.   7. No prior echocardiogram is available for comparison.   8. Symmetric mitral valve leaflet tethering.   9. There is normal LV mass and normal geometry.    ________________________________________________________________________________________  FINDINGS:     Left Ventricle:  Normal left ventricular cavity size. The left ventricular wall thickness is normal. The left ventricular systolic function is severely decreased with an ejection fraction visually estimated at 30 to 35%. There are regional wall motion abnormalities consistent with ischemic heart disease. There is mild (grade 1) left ventricular diastolic dysfunction, with normal filling pressure. There is normal LV mass and normal geometry. There is no evidence of a thrombus in the left ventricle.  LV Wall Scoring: The apex is akinetic. The mid and apical anterior septum, mid  inferolateral segment, apical lateral segment, apical anterior segment, and  basal inferior segment are hypokinetic. All remaining scored segments are  normal.          Right Ventricle:  Normal right ventricular cavity size, normal wall thickness and normal right ventricular systolic function. Tricuspid annular plane systolic excursion (TAPSE) is 2.1 cm (normal >=1.7 cm).     Left Atrium:  The left atrium is normal.     Right Atrium:  The right atrium is normal.     Aortic Valve:  The aortic valve is tricuspid with normal structure without stenosis. There is no evidence of aortic regurgitation.     Mitral Valve:  Structurally normal mitral valve with normal leaflet opening and closing, without any evidence of mitral stenosis or significant regurgitation. There is symmetric leaflet tethering. There is trace mitral regurgitation.    Tricuspid Valve:  Structurally normal tricuspid valve with normal leaflet excursion. There is trace tricuspid regurgitation.     Pulmonic Valve:  Structurally normal pulmonic valve with normal leaflet excursion. There is trace pulmonic regurgitation.     Aorta:  The aortic annulus and aortic root appear normal in size. Normal aorta sinus of Valsalva, measuring 3.20 cm (indexed 2.05 cm/m²).     Pericardium:  No pericardial effusion seen.  ____________________________________________________________________  Quantitative Data:  Left Ventricle Measurements: (Indexed to BSA)     IVSd (2D):   0.9 cm  LVPWd (2D):  0.9 cm  LVIDd (2D):  4.9 cm  LVIDs (2D):  4.1 cm  LV Mass:     151 g  96.9 g/m²  Visualized LV EF%: 30 to 35%     MV E Vmax:    0.57 m/s  MV A Vmax:    1.16 m/s  MV E/A:       0.49  e' lateral:   5.98 cm/s  e' medial:    4.13 cm/s  E/e' lateral: 9.55  E/e' medial:  13.83  E/e' Average: 11.30    Aorta Measurements:     Ao Sinus:      3.2 cm  Ao Root:       3.2 cm  Ao Root s, 2D: 3.2 cm       Left Atrium Measurements: (Indexed to BSA)  LA Diam 2D: 3.40 cm    Right Ventricle Measurements:     TAPSE: 2.1 cm       LVOT / RVOT/ Qp/Qs Data: (Indexed to BSA)  Mitral Valve Measurements:     MV E Vmax: 0.6 m/s  MV A Vmax: 1.2 m/s  MV E/A:   0.5       --------------------------------------------------------------------------------  [employer]:  LV Analysis:  EF: 25 %      < end of copied text >

## 2023-07-12 NOTE — PROGRESS NOTE ADULT - SUBJECTIVE AND OBJECTIVE BOX
SUBJ:  no cp  c/o leg weakness    Home Medications:  amLODIPine 2.5 mg oral tablet: 1 tab(s) orally once a day (2023 17:58)  aspirin 81 mg oral delayed release tablet: 1 tab(s) orally once a day (2023 17:58)  losartan 100 mg oral tablet: 1 tab(s) orally once a day (2023 17:58)  metFORMIN 850 mg oral tablet: 1 tab(s) orally 2 times a day (2023 17:58)  metoprolol 50m tablet orally once a day (2023 17:58)      MEDICATIONS  (STANDING):  albuterol/ipratropium for Nebulization 3 milliLiter(s) Nebulizer every 6 hours  aspirin enteric coated 81 milliGRAM(s) Oral daily  atorvastatin 80 milliGRAM(s) Oral at bedtime  clopidogrel Tablet 75 milliGRAM(s) Oral daily  dextrose 5%. 1000 milliLiter(s) (100 mL/Hr) IV Continuous <Continuous>  dextrose 5%. 1000 milliLiter(s) (50 mL/Hr) IV Continuous <Continuous>  dextrose 50% Injectable 12.5 Gram(s) IV Push once  dextrose 50% Injectable 25 Gram(s) IV Push once  dextrose 50% Injectable 25 Gram(s) IV Push once  glucagon  Injectable 1 milliGRAM(s) IntraMuscular once  heparin   Injectable 5000 Unit(s) SubCutaneous every 12 hours  insulin glargine Injectable (LANTUS) 16 Unit(s) SubCutaneous at bedtime  insulin lispro (ADMELOG) corrective regimen sliding scale   SubCutaneous three times a day before meals  insulin lispro (ADMELOG) corrective regimen sliding scale   SubCutaneous at bedtime  insulin lispro Injectable (ADMELOG) 5 Unit(s) SubCutaneous three times a day before meals  metoprolol succinate ER 50 milliGRAM(s) Oral daily  sacubitril 49 mG/valsartan 51 mG 1 Tablet(s) Oral two times a day    MEDICATIONS  (PRN):  dextrose Oral Gel 15 Gram(s) Oral once PRN Blood Glucose LESS THAN 70 milliGRAM(s)/deciliter  guaiFENesin Oral Liquid (Sugar-Free) 100 milliGRAM(s) Oral every 6 hours PRN Cough      Vital Signs Last 24 Hrs  T(C): 36.8 (2023 17:04), Max: 36.9 (2023 09:22)  T(F): 98.2 (2023 17:04), Max: 98.4 (2023 09:22)  HR: 58 (2023 17:04) (57 - 65)  BP: 116/67 (2023 17:04) (100/63 - 144/85)  BP(mean): --  RR: 18 (2023 17:04) (18 - 18)  SpO2: 98% (2023 17:04) (95% - 98%)    Parameters below as of 2023 17:04  Patient On (Oxygen Delivery Method): room air        REVIEW OF SYSTEMS:  As per HPI, otherwise unremarkable.     PHYSICAL EXAM:  Constitutional/Appearance: Normal, Well-developed  HEENT:   Normal oral mucosa, no drainage or redness, supple neck  Lymphatic: No lymphadenopathy  Cardiovascular: Normal S1 S2, No edema, RRR  Respiratory: Lungs clear to auscultation, respirations non-labored  Psychiatry: A & O x 3, appropriate affect.   Gastrointestinal:  Soft, Non-tender, no distention  Skin: No rashes, No ecchymoses, No cyanosis	  Neurologic: Non-focal, Alert and oriented x 3  Extremities: Normal range of motion  Vascular: Peripheral pulses palpable 2+ bilaterally (radial)      LABS:  CBC Full  -  ( 2023 09:48 )  WBC Count : 8.07 K/uL  RBC Count : 4.64 M/uL  Hemoglobin : 13.9 g/dL  Hematocrit : 43.5 %  Platelet Count - Automated : 251 K/uL  Mean Cell Volume : 93.8 fl  Mean Cell Hemoglobin : 30.0 pg  Mean Cell Hemoglobin Concentration : 32.0 gm/dL  Auto Neutrophil # : x  Auto Lymphocyte # : x  Auto Monocyte # : x  Auto Eosinophil # : x  Auto Basophil # : x  Auto Neutrophil % : x  Auto Lymphocyte % : x  Auto Monocyte % : x  Auto Eosinophil % : x  Auto Basophil % : x    07-12    134<L>  |  103  |  21  ----------------------------<  130<H>  4.7   |  21<L>  |  0.87    Ca    9.7      2023 07:14  Mg     2.1     07-11      MPRESSION AND PLAN:  73 y.o with a PMH of CAD s/p 3 stents, HTN, T2DM, admitted for pneumonia, cardiology consulted for ST depression and LVH noted on EKG.  EF 30-35%  ACS ruled out EF down to 30s, LHC showed TVD. CP free, euvolemic. s/p L Leg weakness      CHF   TTE w/ EF 30-35%.   - euvolemic on exam  - Continue entresto 49/51mg bid (will see how pt will obtain after d/c home given cost)  - Continue metop succinate 50mg daily  - start aldactone 25 mg po qd for routine CHF care.--> follow K    CAD  - continue Aspirin, Plavix  atorva 80.  - Cath 7/10 with Complex PCI with impella with Adriana Gross-->s/p PCT to p OM, PCI to mRCA  - right groin clean    L Leg imbalance post cath   - Head CT , Neuro w/u in progress    Zohreh Burr MD  Cardiology Attending

## 2023-07-12 NOTE — PROGRESS NOTE ADULT - PROBLEM SELECTOR PLAN 1
- Test BGs AC TID and at HS  - C/w Lantus dose to 16 units nightly   - C/w Admelog 5 units before meals, Hold if NPO or not eating  - C/w low admelog correction scale before meals and before bedtime  Discharge  - Will be determine based on insulin requirement, BG trends and oral intake  - Limited options for diabetes medications due to undocumented status with no insurance.   - Basal insulin + oral regimen -SGLT2 ( Farxiga or Jardiance ) given Heart failure  and Metformin  - Pt to fill out for for [facility] for insulin assistance  - if he is not eligible for [facility], not able to get insulin due to cost, then can use coupons until pt is back in [facility]. Spoke to pt he needs to get pharmacy in [facility] that carries meds he is prescribe for.     - Pt needs basal insulin for now. Will use coupon as well at time of discharge  - Make sure pt has glucometer, strips and Lancets, insulin and insulin pens upon discharge  - Follow up at medicine clinic [place]  -Needs optho and cardiac f/u as well

## 2023-07-12 NOTE — PROGRESS NOTE ADULT - SUBJECTIVE AND OBJECTIVE BOX
DATE OF SERVICE: 07-12-23 @ 10:48  CHIEF COMPLAINT:Patient is a 73y old  Male who presents with a chief complaint of CP cad (11 Jul 2023 15:36)    	        PAST MEDICAL & SURGICAL HISTORY:  HTN (hypertension)      DM (diabetes mellitus)              REVIEW OF SYSTEMS:  CONSTITUTIONAL: No fever, weight loss, or fatigue  EYES: No eye pain, visual disturbances, or discharge  NECK: No pain or stiffness  RESPIRATORY: No cough, wheezing, chills or hemoptysis; No Shortness of Breath  CARDIOVASCULAR: No chest pain, palpitations, passing out, dizziness, or leg swelling  GASTROINTESTINAL: No abdominal or epigastric pain. No nausea, vomiting, or hematemesis; No diarrhea or constipation. No melena or hematochezia.  GENITOURINARY: No dysuria, frequency, hematuria, or incontinence  NEUROLOGICAL: No headaches,     Medications:  MEDICATIONS  (STANDING):  albuterol/ipratropium for Nebulization 3 milliLiter(s) Nebulizer every 6 hours  aspirin enteric coated 81 milliGRAM(s) Oral daily  atorvastatin 80 milliGRAM(s) Oral at bedtime  clopidogrel Tablet 75 milliGRAM(s) Oral daily  dextrose 5%. 1000 milliLiter(s) (100 mL/Hr) IV Continuous <Continuous>  dextrose 5%. 1000 milliLiter(s) (50 mL/Hr) IV Continuous <Continuous>  dextrose 50% Injectable 12.5 Gram(s) IV Push once  dextrose 50% Injectable 25 Gram(s) IV Push once  dextrose 50% Injectable 25 Gram(s) IV Push once  glucagon  Injectable 1 milliGRAM(s) IntraMuscular once  heparin   Injectable 5000 Unit(s) SubCutaneous every 12 hours  insulin glargine Injectable (LANTUS) 16 Unit(s) SubCutaneous at bedtime  insulin lispro (ADMELOG) corrective regimen sliding scale   SubCutaneous at bedtime  insulin lispro (ADMELOG) corrective regimen sliding scale   SubCutaneous three times a day before meals  insulin lispro Injectable (ADMELOG) 5 Unit(s) SubCutaneous three times a day before meals  metoprolol succinate ER 50 milliGRAM(s) Oral daily  sacubitril 49 mG/valsartan 51 mG 1 Tablet(s) Oral two times a day    MEDICATIONS  (PRN):  dextrose Oral Gel 15 Gram(s) Oral once PRN Blood Glucose LESS THAN 70 milliGRAM(s)/deciliter  guaiFENesin Oral Liquid (Sugar-Free) 100 milliGRAM(s) Oral every 6 hours PRN Cough    	    PHYSICAL EXAM:  T(C): 36.9 (07-12-23 @ 09:22), Max: 36.9 (07-12-23 @ 09:22)  HR: 57 (07-12-23 @ 09:22) (57 - 65)  BP: 133/73 (07-12-23 @ 09:22) (107/64 - 144/85)  RR: 18 (07-12-23 @ 09:22) (17 - 18)  SpO2: 97% (07-12-23 @ 09:22) (95% - 97%)  Wt(kg): --  I&O's Summary    11 Jul 2023 07:01  -  12 Jul 2023 07:00  --------------------------------------------------------  IN: 1320 mL / OUT: 300 mL / NET: 1020 mL    12 Jul 2023 07:01  -  12 Jul 2023 10:48  --------------------------------------------------------  IN: 240 mL / OUT: 0 mL / NET: 240 mL        Appearance: Normal	  HEENT:   Normal oral mucosa, PERRL, EOMI	  Lymphatic: No lymphadenopathy  Cardiovascular: Normal S1 S2, No JVD, No murmurs, No edema  Respiratory: Lungs clear to auscultation	  Psychiatry: A & O x 3, Mood & affect appropriate  Gastrointestinal:  Soft, Non-tender, + BS	  some llext weakness    TELEMETRY: 	    ECG:  	  RADIOLOGY:  OTHER: 	  	  LABS:	 	    CARDIAC MARKERS:                                13.9   8.07  )-----------( 251      ( 11 Jul 2023 09:48 )             43.5     07-12    134<L>  |  103  |  21  ----------------------------<  130<H>  4.7   |  21<L>  |  0.87    Ca    9.7      12 Jul 2023 07:14  Mg     2.1     07-11      proBNP:   Lipid Profile:   HgA1c:   TSH:

## 2023-07-12 NOTE — PROGRESS NOTE ADULT - ASSESSMENT
74 y/o mostly Polish speaking M with PMH of DM, HTN, CAD s/p 3 stents. Presents with cough for the past 2 weeks. CXR with small L basilar opacity. Also noted to be in hypertensive urgency in ED.

## 2023-07-12 NOTE — PROGRESS NOTE ADULT - PROBLEM SELECTOR PLAN 1
acute bronchitis - CT chest with no clear PNA, few GGO/nodules  -Small L basilar opacity seen on CXR corresponds to atelectasis seen on CT chest   -Cough x 2 weeks, rhonchi L base (now improving)  -S/p Ceftriaxone x 5 days   -Continue Duoneb q6h  -Normoxic, keep sats >90% with o2 PRN.  -Cough resolved. Robitussin q6h PRN.

## 2023-07-12 NOTE — CHART NOTE - NSCHARTNOTEFT_GEN_A_CORE
***Vascular Neurology Follow-up Chart Note***    Patient seen today with attending on rounds. Please also refer to attending addendum on initial consult note dated 7/11    Exam stable      Impression:  LLE weakness in the setting of multiple cardiac comorbidities. Etiology unclear but will evaluate for ischemic stroke        Plan:    [x] ANTITHROMBOTIC THERAPY: ASA and Plavix  [] MRI  [x] Vessel imaging  [x] TTE                   [x] A1C 10,   [x] Atorvastatin 80 [] not on high intensity statin due to  []  Physical therapy/OT/Speech Language    SBP goal: Normotension   Other:    Case & Plan discussed with patient and family. All questions answered. Plan discussed with primary team       CORE MEASURES:         Admission NIHSS: 0     Tenecteplase: [] YES [x] NO     Statin Therapy: [x] YES [] NO     Smoking [] YES [x] NO     Afib [] YES [x] NO     Stroke Education [x] YES [] NO    Dysphagia screen : [] Passed [] Failed- S&S pending [x] Pending  (Ensure medications are ordered via appropriate route)    DVT ppx: Venodynes [x] Heparin s.c [] LMWH [] Not on chemical DVT ppx due to: ***Vascular Neurology Follow-up Chart Note***    Patient seen today with attending on rounds. Please also refer to attending addendum on initial consult note dated 7/11    Exam stable except with left leg mild numbness.       Impression:  LLE weakness possibly due to acute ischemic stroke vs lumbar radiculopathy       Plan:    [x] ANTITHROMBOTIC THERAPY: ASA and Plavix  [] MRI  [x] Vessel imaging  [x] TTE                   [x] A1C 10,   [x] Atorvastatin 80 [] not on high intensity statin due to  []  Physical therapy/OT/Speech Language    SBP goal: Normotension   Other:    Case & Plan discussed with patient and family. All questions answered. Plan discussed with primary team       CORE MEASURES:         Admission NIHSS: 0     Tenecteplase: [] YES [x] NO     Statin Therapy: [x] YES [] NO     Smoking [] YES [x] NO     Afib [] YES [x] NO     Stroke Education [x] YES [] NO    Dysphagia screen : [] Passed [] Failed- S&S pending [x] Pending  (Ensure medications are ordered via appropriate route)    DVT ppx: Venodynes [x] Heparin s.c [] LMWH [] Not on chemical DVT ppx due to:

## 2023-07-12 NOTE — PROGRESS NOTE ADULT - SUBJECTIVE AND OBJECTIVE BOX
[facility] INVASIVE CARDIOLOGY- (Koffi, Lucina, Leatha, Grady, Adriana, Tena, Milena, David, Donny, Rob)   CARDIAC CATH LAB, [facility] TEAM   [phone]      CHIEF COMPLAINT: Patient is a 73y old  Male who presents with a chief complaint of CP cad (11 Jul 2023 15:36)      HPI:  73M PMH DM, HTN, CAD s/p 3 stents p/w cough productive of green sputum.    Pt says the cough started 2 weeks ago. He came from [place] 1 month ago.   Has some SOB when coughing, feels like coughing more when laying supine. Denies fever, chills, CP, abdominal pain, urinary symptoms, diarrhea, weight gain or weight loss, LE edema. (26 Jun 2023 21:03)        Subjective/Observations: patient seen and examined.  denies chest pain, dyspena, dizziness, palpitations, N&V, HA      Review of Systems all WNL except below indicated:    Constitutional: [ ] Fever [ ] Chills [ ] Fatigue [ ] Weight change   HEENT: [ ] Blurred vision [ ] Eye Pain [ ] Headache [ ] Runny nose [ ] Sore Throat   Respiratory: [ ] Cough [ ] Wheezing [ ] Shortness of breath  Cardiovascular: [ ] Chest Pain [ ] Palpitations [ ] SINGH [ ] PND [ ] Orthopnea  Gastrointestinal: [ ] Abdominal Pain [ ] Diarrhea [ ] Constipation [ ] Hemorrhoids [ ] Nausea [ ] Vomiting  Genitourinary: [ ] Nocturia [ ] Dysuria [ ] Incontinence  Extremities: [ ] Swelling [ ] Joint Pain  Neurologic: [ ] Focal deficit [ ] Paresthesias [ ] Syncope  Lymphatic: [ ] Swelling [ ] Lymphadenopathy   Skin: [ ] Rash [ ] Ecchymoses [ ] Wounds [ ] Lesions  Psychiatry: [ ] Depression [ ] Suicidal/Homicidal Ideation [ ] Anxiety [ ] Sleep Disturbances  [ ] 10 point review of systems is otherwise negative except as mentioned above            [ ]Unable to obtain    PAST MEDICAL & SURGICAL HISTORY:  HTN (hypertension)      DM (diabetes mellitus)  MEDICATIONS  (STANDING):  albuterol/ipratropium for Nebulization 3 milliLiter(s) Nebulizer every 6 hours  aspirin enteric coated 81 milliGRAM(s) Oral daily  atorvastatin 80 milliGRAM(s) Oral at bedtime  clopidogrel Tablet 75 milliGRAM(s) Oral daily  dextrose 5%. 1000 milliLiter(s) (50 mL/Hr) IV Continuous <Continuous>  dextrose 5%. 1000 milliLiter(s) (100 mL/Hr) IV Continuous <Continuous>  dextrose 50% Injectable 12.5 Gram(s) IV Push once  dextrose 50% Injectable 25 Gram(s) IV Push once  dextrose 50% Injectable 25 Gram(s) IV Push once  glucagon  Injectable 1 milliGRAM(s) IntraMuscular once  heparin   Injectable 5000 Unit(s) SubCutaneous every 12 hours  insulin glargine Injectable (LANTUS) 16 Unit(s) SubCutaneous at bedtime  insulin lispro (ADMELOG) corrective regimen sliding scale   SubCutaneous at bedtime  insulin lispro (ADMELOG) corrective regimen sliding scale   SubCutaneous three times a day before meals  insulin lispro Injectable (ADMELOG) 5 Unit(s) SubCutaneous three times a day before meals  metoprolol succinate ER 50 milliGRAM(s) Oral daily  sacubitril 49 mG/valsartan 51 mG 1 Tablet(s) Oral two times a day    MEDICATIONS  (PRN):  dextrose Oral Gel 15 Gram(s) Oral once PRN Blood Glucose LESS THAN 70 milliGRAM(s)/deciliter  guaiFENesin Oral Liquid (Sugar-Free) 100 milliGRAM(s) Oral every 6 hours PRN Cough      Allergies    No Known Allergies    Intolerances  Vital Signs Last 24 Hrs  T(C): 36.9 (12 Jul 2023 09:22), Max: 36.9 (12 Jul 2023 09:22)  T(F): 98.4 (12 Jul 2023 09:22), Max: 98.4 (12 Jul 2023 09:22)  HR: 57 (12 Jul 2023 09:22) (57 - 65)  BP: 133/73 (12 Jul 2023 09:22) (107/64 - 144/85)  BP(mean): --  RR: 18 (12 Jul 2023 09:22) (17 - 18)  SpO2: 97% (12 Jul 2023 09:22) (95% - 97%)    Parameters below as of 12 Jul 2023 09:22  Patient On (Oxygen Delivery Method): room air        I&O's Summary    11 Jul 2023 07:01  -  12 Jul 2023 07:00  --------------------------------------------------------  IN: 1320 mL / OUT: 300 mL / NET: 1020 mL    12 Jul 2023 07:01  -  12 Jul 2023 13:49  --------------------------------------------------------  IN: 240 mL / OUT: 0 mL / NET: 240 mL          FOCUSED PHYSICAL EXAM:  Pulmonary: Non-labored, breath sounds are clear bilaterally, No wheezing, rales or rhonchi  Cardiovascular: Regular, S1 and S2, No murmurs, rubs, gallops or clicks  cath site: ( groin/radial)  stable w/o bleeding or hematoma, soft, + pulses     LABS: All Labs Reviewed:                        13.9   8.07  )-----------( 251      ( 11 Jul 2023 09:48 )             43.5     12 Jul 2023 07:14    134    |  103    |  21     ----------------------------<  130    4.7     |  21     |  0.87   11 Jul 2023 09:50    135    |  102    |  18     ----------------------------<  134    4.6     |  23     |  0.80     Ca    9.7        12 Jul 2023 07:14  Ca    9.6        11 Jul 2023 09:50  Mg     2.1       11 Jul 2023 09:50          RESULTS:  TELE interpretation: SR 80's     CATH REPORT:  < from: Cardiac Catheterization (07.10.23 @ 12:23) >    Procedures Performed   Procedures:              1.    Arterial Access - Right Femoral     2.    Ultrasound Guided Access   3.    PCI: JESS   4.    Impella CP   5.    Perclose   6.    PCI: JESS     Indications:               Staged Procedures   PCI Status:               elective     Conclusions:   PCI with JESS to the mRCA and 1st OM.     Recommendations:     Continue DAPT       < end of copied text >

## 2023-07-12 NOTE — PROGRESS NOTE ADULT - NSPROGADDITIONALINFOA_GEN_ALL_CORE
-Plan discussed with pt/team. Primary team aware of LLE weakness> neuro consult  Contact info: [phone] (24/7). pager [phone]  Geena on [place]  Teams on M-T-W-F. Unavailable Thu/Weekends/Holidays  Assessed pt/labs/meds and discussed plan of care with primary team  Adjusting insulin  Discharge plan  Follow up care

## 2023-07-12 NOTE — PROGRESS NOTE ADULT - ASSESSMENT
73M with CAD s/p 3 stents, HTN, T2DM, admitted for pneumonia, cardiology consulted for ST depressions and LVH noted on EKG.      ST Depressions  Hypertensive urgency resolved    -F/u  TTE noted  cath noted  triple vessel   s/p cath w/ pci    llext weakness  r/o cva  cts noted  for mri   neuro to f/u      c/w meds    likely pna  bronchitis  s/p abs  nebs  pulm eval  noted    dm  fsg riss  dvt proph

## 2023-07-13 DIAGNOSIS — R29.898 OTHER SYMPTOMS AND SIGNS INVOLVING THE MUSCULOSKELETAL SYSTEM: ICD-10-CM

## 2023-07-13 DIAGNOSIS — H91.90 UNSPECIFIED HEARING LOSS, UNSPECIFIED EAR: ICD-10-CM

## 2023-07-13 LAB
GLUCOSE BLDC GLUCOMTR-MCNC: 102 MG/DL — HIGH (ref 70–99)
GLUCOSE BLDC GLUCOMTR-MCNC: 122 MG/DL — HIGH (ref 70–99)
GLUCOSE BLDC GLUCOMTR-MCNC: 127 MG/DL — HIGH (ref 70–99)
GLUCOSE BLDC GLUCOMTR-MCNC: 98 MG/DL — SIGNIFICANT CHANGE UP (ref 70–99)

## 2023-07-13 PROCEDURE — 69210 REMOVE IMPACTED EAR WAX UNI: CPT

## 2023-07-13 PROCEDURE — 99233 SBSQ HOSP IP/OBS HIGH 50: CPT

## 2023-07-13 PROCEDURE — 70544 MR ANGIOGRAPHY HEAD W/O DYE: CPT | Mod: 26

## 2023-07-13 RX ORDER — INSULIN GLARGINE 100 [IU]/ML
16 INJECTION, SOLUTION SUBCUTANEOUS
Qty: 2 | Refills: 0
Start: 2023-07-13 | End: 2023-08-11

## 2023-07-13 RX ADMIN — SACUBITRIL AND VALSARTAN 1 TABLET(S): 24; 26 TABLET, FILM COATED ORAL at 05:48

## 2023-07-13 RX ADMIN — Medication 3 MILLILITER(S): at 05:48

## 2023-07-13 RX ADMIN — ATORVASTATIN CALCIUM 80 MILLIGRAM(S): 80 TABLET, FILM COATED ORAL at 21:56

## 2023-07-13 RX ADMIN — SACUBITRIL AND VALSARTAN 1 TABLET(S): 24; 26 TABLET, FILM COATED ORAL at 17:15

## 2023-07-13 RX ADMIN — Medication 5 UNIT(S): at 09:46

## 2023-07-13 RX ADMIN — INSULIN GLARGINE 16 UNIT(S): 100 INJECTION, SOLUTION SUBCUTANEOUS at 22:51

## 2023-07-13 RX ADMIN — Medication 81 MILLIGRAM(S): at 11:26

## 2023-07-13 RX ADMIN — Medication 100 MILLIGRAM(S): at 21:56

## 2023-07-13 RX ADMIN — CLOPIDOGREL BISULFATE 75 MILLIGRAM(S): 75 TABLET, FILM COATED ORAL at 11:25

## 2023-07-13 RX ADMIN — HEPARIN SODIUM 5000 UNIT(S): 5000 INJECTION INTRAVENOUS; SUBCUTANEOUS at 05:48

## 2023-07-13 RX ADMIN — Medication 5 UNIT(S): at 13:12

## 2023-07-13 RX ADMIN — HEPARIN SODIUM 5000 UNIT(S): 5000 INJECTION INTRAVENOUS; SUBCUTANEOUS at 17:11

## 2023-07-13 RX ADMIN — Medication 50 MILLIGRAM(S): at 05:48

## 2023-07-13 RX ADMIN — Medication 5 UNIT(S): at 17:49

## 2023-07-13 NOTE — PROGRESS NOTE ADULT - SUBJECTIVE AND OBJECTIVE BOX
Seen earlier today using pacific ( [id number] Michael)    Chief Complaint: Diabetes Mellitus follow up    INTERVAL HX: Tolerating POs with good appetite. BGs at goal with current insulin regimen. s/p MRA of head today for further neurologic evaluation.       Review of Systems:  General: As above  GI: No nausea, vomiting  Endocrine: no  S&Sx of hypoglycemia    Allergies    No Known Allergies    Intolerances      MEDICATIONS  (STANDING):    atorvastatin 80 milliGRAM(s) Oral at bedtime  dextrose 5%. 1000 milliLiter(s) (100 mL/Hr) IV Continuous <Continuous>  dextrose 5%. 1000 milliLiter(s) (50 mL/Hr) IV Continuous <Continuous>  dextrose 50% Injectable 12.5 Gram(s) IV Push once  dextrose 50% Injectable 25 Gram(s) IV Push once  dextrose 50% Injectable 25 Gram(s) IV Push once  glucagon  Injectable 1 milliGRAM(s) IntraMuscular once  insulin glargine Injectable (LANTUS) 16 Unit(s) SubCutaneous at bedtime  insulin lispro (ADMELOG) corrective regimen sliding scale   SubCutaneous at bedtime  insulin lispro (ADMELOG) corrective regimen sliding scale   SubCutaneous three times a day before meals  insulin lispro Injectable (ADMELOG) 5 Unit(s) SubCutaneous three times a day before meals  metoprolol succinate ER 50 milliGRAM(s) Oral daily  sacubitril 49 mG/valsartan 51 mG 1 Tablet(s) Oral two times a day      atorvastatin   80 milliGRAM(s) Oral (07-12-23 @ 22:12)    insulin glargine Injectable (LANTUS)   16 Unit(s) SubCutaneous (07-12-23 @ 22:12)    insulin lispro Injectable (ADMELOG)   5 Unit(s) SubCutaneous (07-13-23 @ 13:12)   5 Unit(s) SubCutaneous (07-13-23 @ 09:46)   5 Unit(s) SubCutaneous (07-12-23 @ 17:45)        PHYSICAL EXAM:  VITALS: T(C): 36.3 (07-13-23 @ 13:19)  T(F): 97.3 (07-13-23 @ 13:19), Max: 100 (07-12-23 @ 21:33)  HR: 63 (07-13-23 @ 13:19) (58 - 78)  BP: 103/61 (07-13-23 @ 13:19) (103/61 - 124/78)  RR:  (18 - 18)  SpO2:  (96% - 98%)  Wt(kg): --  GENERAL: Male sitting in chair, in NAD  Respiratory: Respirations unlabored   Extremities: Warm, no edema  NEURO: Alert , appropriate     LABS:  POCT Blood Glucose.: 122 mg/dL (07-13-23 @ 12:58)  POCT Blood Glucose.: 127 mg/dL (07-13-23 @ 09:17)  POCT Blood Glucose.: 127 mg/dL (07-12-23 @ 22:07)  POCT Blood Glucose.: 98 mg/dL (07-12-23 @ 17:01)  POCT Blood Glucose.: 192 mg/dL (07-12-23 @ 12:50)  POCT Blood Glucose.: 120 mg/dL (07-12-23 @ 08:49)  POCT Blood Glucose.: 175 mg/dL (07-11-23 @ 21:40)  POCT Blood Glucose.: 110 mg/dL (07-11-23 @ 17:23)  POCT Blood Glucose.: 94 mg/dL (07-11-23 @ 12:53)  POCT Blood Glucose.: 116 mg/dL (07-11-23 @ 09:09)  POCT Blood Glucose.: 166 mg/dL (07-10-23 @ 21:40)  POCT Blood Glucose.: 160 mg/dL (07-10-23 @ 17:56)  POCT Blood Glucose.: 155 mg/dL (07-10-23 @ 17:38)                          13.9   8.07  )-----------( 251      ( 11 Jul 2023 09:48 )             43.5     07-12    134<L>  |  103  |  21  ----------------------------<  130<H>  4.7   |  21<L>  |  0.87    Ca    9.7      12 Jul 2023 07:14        06-27 Chol 183 Direct LDL -- LDL calculated 114<H> HDL 43 Trig 136  Thyroid Function Tests:  06-29 @ 19:11 TSH 4.73 FreeT4 -- T3 66 Anti TPO -- Anti Thyroglobulin Ab -- TSI --  06-27 @ 07:41 TSH 5.56 FreeT4 -- T3 -- Anti TPO -- Anti Thyroglobulin Ab -- TSI --      A1C with Estimated Average Glucose Result: 10.0 % (06-27-23 @ 07:41)    Estimated Average Glucose: 240 mg/dL (06-27-23 @ 07:41)        Diet, Consistent Carbohydrate/No Snacks:   Low Sodium (07-04-23 @ 15:28) [Active]

## 2023-07-13 NOTE — PROGRESS NOTE ADULT - PROBLEM SELECTOR PLAN 1
- Test BGs AC TID and at HS  - C/w Lantus dose to 16 units nightly   - C/w Admelog 5 units before meals, Hold if NPO or not eating  - C/w low admelog correction scale before meals and before bedtime  Discharge  - Will be determine based on insulin requirement, BG trends and oral intake  - Limited options for diabetes medications due to undocumented status with no insurance.   - Basal insulin + oral regimen -SGLT2 ( Farxiga or Jardiance ) given Heart failure  and Metformin  - Pt to fill out for for [facility] for insulin assistance  - if he is not eligible for [facility], not able to get insulin due to cost, then can use coupons until pt is back in [facility]. Spoke to pt he needs to get pharmacy in [facility] that carries meds he is prescribe for.     - Pt needs basal insulin for now. Will use coupon as well at time of discharge  - Make sure pt has glucometer, strips and Lancets, insulin and insulin pens upon discharge  - Follow up at medicine clinic [place]  -Needs optho and cardiac f/u as well

## 2023-07-13 NOTE — PROGRESS NOTE ADULT - PROBLEM SELECTOR PLAN 3
Currently on Atorvastatin 80 mg daily  Goal LDL < 55  Pt   Continue with high intensity statin given his CAD and HFrEF  Follow up levels as out pt    Assessed pt/labs/meds and discussed plan of care with primary team/pt  Insulin adjustment   Discharge plan  Follow up care    Contact via [employer] Teams during business hours  To reach covering provider access AMION via [employer]  For Urgent matters/after-hours/weekends/holidays please page endocrine fellow on call   For nonurgent matters please email [email or web address]    Please note that this patient may be followed by different provider tomorrow.  Notify endocrine 24 hours prior to discharge for final recommendations

## 2023-07-13 NOTE — PROGRESS NOTE ADULT - SUBJECTIVE AND OBJECTIVE BOX
DATE OF SERVICE: 07-13-23 @ 11:53  CHIEF COMPLAINT:Patient is a 73y old  Male who presents with a chief complaint of cp (12 Jul 2023 17:44)    	        PAST MEDICAL & SURGICAL HISTORY:  HTN (hypertension)      DM (diabetes mellitus)                NECK: No pain or stiffness  RESPIRATORY: No cough, wheezing, chills or hemoptysis; No Shortness of Breath  CARDIOVASCULAR: No chest pain, palpitations, passing out, dizziness, o  GASTROINTESTINAL: No abdominal or epigastric pain. No nausea, vomiting, or hematemesis  GENITOURINARY: No dysuria, frequency, hematuria, or incontinence  NEUROLOGICAL: No headaches,     Medications:  MEDICATIONS  (STANDING):  aspirin enteric coated 81 milliGRAM(s) Oral daily  atorvastatin 80 milliGRAM(s) Oral at bedtime  clopidogrel Tablet 75 milliGRAM(s) Oral daily  dextrose 5%. 1000 milliLiter(s) (50 mL/Hr) IV Continuous <Continuous>  dextrose 5%. 1000 milliLiter(s) (100 mL/Hr) IV Continuous <Continuous>  dextrose 50% Injectable 12.5 Gram(s) IV Push once  dextrose 50% Injectable 25 Gram(s) IV Push once  dextrose 50% Injectable 25 Gram(s) IV Push once  glucagon  Injectable 1 milliGRAM(s) IntraMuscular once  heparin   Injectable 5000 Unit(s) SubCutaneous every 12 hours  insulin glargine Injectable (LANTUS) 16 Unit(s) SubCutaneous at bedtime  insulin lispro (ADMELOG) corrective regimen sliding scale   SubCutaneous at bedtime  insulin lispro (ADMELOG) corrective regimen sliding scale   SubCutaneous three times a day before meals  insulin lispro Injectable (ADMELOG) 5 Unit(s) SubCutaneous three times a day before meals  metoprolol succinate ER 50 milliGRAM(s) Oral daily  sacubitril 49 mG/valsartan 51 mG 1 Tablet(s) Oral two times a day    MEDICATIONS  (PRN):  dextrose Oral Gel 15 Gram(s) Oral once PRN Blood Glucose LESS THAN 70 milliGRAM(s)/deciliter  guaiFENesin Oral Liquid (Sugar-Free) 100 milliGRAM(s) Oral every 6 hours PRN Cough    	    PHYSICAL EXAM:  T(C): 36.6 (07-13-23 @ 08:09), Max: 37.8 (07-12-23 @ 21:33)  HR: 61 (07-13-23 @ 08:09) (58 - 78)  BP: 106/68 (07-13-23 @ 08:09) (100/63 - 124/78)  RR: 18 (07-13-23 @ 08:09) (18 - 18)  SpO2: 98% (07-13-23 @ 08:09) (96% - 98%)  Wt(kg): --  I&O's Summary    12 Jul 2023 07:01  -  13 Jul 2023 07:00  --------------------------------------------------------  IN: 900 mL / OUT: 200 mL / NET: 700 mL        Appearance: Normal	  HEENT:   Normal oral mucosa, PERRL, EOMI	    Cardiovascular: Normal S1 S2, No JVD,   Respiratory: Lungs clear to auscultation	  Psychiatry: A & O x 3, Mood & affect appropriate  Gastrointestinal:  Soft, Non-tender, + BS	  Skin: No rashes, No ecchymoses, No cyanosis	  Neurologic: some lext weaknes    TELEMETRY: 	    ECG:  	  RADIOLOGY:  OTHER: 	  	  LABS:	 	    CARDIAC MARKERS:            07-12    134<L>  |  103  |  21  ----------------------------<  130<H>  4.7   |  21<L>  |  0.87    Ca    9.7      12 Jul 2023 07:14      proBNP:   Lipid Profile:   HgA1c:   TSH:

## 2023-07-13 NOTE — PROGRESS NOTE ADULT - PROBLEM SELECTOR PLAN 1
- Recommend: audiogram, may consider steroids if indicated  - Left ear s/p partal cerumen removal - recommend outpatient ENT clinic for further removal and continued care.   - ENT to continue to follow - Follow up Audiogram. Pending results may consider steroids if indicated  - ENT continue to follow    Follow up outpatient with Dr. Singleton/Paulina/Yves/Denilson [facility] [phone] or [facility] [phone].

## 2023-07-13 NOTE — PROGRESS NOTE ADULT - ASSESSMENT
73M with CAD s/p 3 stents, HTN, T2DM, admitted for pneumonia, cardiology consulted for ST depressions and LVH noted on EKG.      ST Depressions  Hypertensive urgency resolved    -F/u  TTE noted  cath noted  triple vessel   s/p cath w/ pci    llext weakness  r/o cva  cts noted  mri done   neuro f/u     c/w meds    likely pna  bronchitis  s/p abs  nebs  pulm eval  noted    dm  fsg riss  dvt proph      d/c if cleared by neuro

## 2023-07-13 NOTE — PROGRESS NOTE ADULT - ASSESSMENT
72 yo M w/h/o uncontrolled and untreated T2DM (A1C10%) formerly on Dapagliflozin and Sitagliptin > stopped meds for 4 months PTA because pharmacy in  "[facility]" didn't have them. Stopped Metformin due to large weight lost reported by pt. DM c/b CAD > s/p 3 stents. Also h/o HTN, presents with SOB, found to have acute bronchitis, treated with antibiotic. Patient also found to have EF of 30%, s/p LHC on 6/29 which showed TVD>now s/p  X1 JESS to proxOM and X1 JESS to mRCA via RFA  7/10/23. Endocrinology following for diabetes management. Tolerating POs with BG mostly at goal while on present insulin doses. No hypoglycemia.  Will c/w present insulin doses for now  to keep BG Goal 100-180mg/dl     Pt is uninsured and will need to assess what DM pt can afford. Might benefit from coupons until pt goes back to [facility]. Pt states he is not going back to [facility] for now.   Checking [facility] eligibility- [facility] form faxed today by .  Team was requested to send Rx for Lantus 16 units at HS to  [facility]    As per pt, he was on Sitagliptin 50 mg daily and Dapagliflozin 10 mg daily until 4 months ago in [facility] but pharmacy didn't carry meds.

## 2023-07-13 NOTE — PROGRESS NOTE ADULT - NS ATTEND AMEND GEN_ALL_CORE FT
ENT consulted for left ear fullness.  Patient states he noticed a decreased in his left hearing since 7/5/23. Patient states he knows he has baseline hearing loss before this decreased.    Physical exam shows bilateral Left ear cerumen impaction. Removed at bedside.    Despite removal of cerumen patient states still slight left hearing decrease    Recommend:  - Follow up Audiogram. Pending results may consider steroids if indicated  - Left ear s/p partal cerumen removal - recommend outpatient ENT clinic for further removal and continued care.   - ENT to continue to follow ENT consulted for left ear fullness.  Patient states he noticed a decreased in his left hearing since 7/5/23. Patient states he knows he has baseline hearing loss before this decreased.    Physical exam shows bilateral Left ear cerumen impaction. Removed at bedside.    Despite removal of cerumen patient states still slight left hearing decrease    Recommend:  - Follow up Audiogram. Pending results may consider steroids if indicated  - ENT continue to follow    Follow up outpatient with Dr. Singleton/Paulina/Yves/Denilson [address] [place] [address] [phone] or [address] [place] [address] [phone].

## 2023-07-13 NOTE — PROGRESS NOTE ADULT - SUBJECTIVE AND OBJECTIVE BOX
Follow-up Pulm Progress Note    No new respiratory events overnight.  Denies SOB/CP.     Medications:  MEDICATIONS  (STANDING):  albuterol/ipratropium for Nebulization 3 milliLiter(s) Nebulizer every 6 hours  aspirin enteric coated 81 milliGRAM(s) Oral daily  atorvastatin 80 milliGRAM(s) Oral at bedtime  clopidogrel Tablet 75 milliGRAM(s) Oral daily  dextrose 5%. 1000 milliLiter(s) (50 mL/Hr) IV Continuous <Continuous>  dextrose 5%. 1000 milliLiter(s) (100 mL/Hr) IV Continuous <Continuous>  dextrose 50% Injectable 12.5 Gram(s) IV Push once  dextrose 50% Injectable 25 Gram(s) IV Push once  dextrose 50% Injectable 25 Gram(s) IV Push once  glucagon  Injectable 1 milliGRAM(s) IntraMuscular once  heparin   Injectable 5000 Unit(s) SubCutaneous every 12 hours  insulin glargine Injectable (LANTUS) 16 Unit(s) SubCutaneous at bedtime  insulin lispro (ADMELOG) corrective regimen sliding scale   SubCutaneous at bedtime  insulin lispro (ADMELOG) corrective regimen sliding scale   SubCutaneous three times a day before meals  insulin lispro Injectable (ADMELOG) 5 Unit(s) SubCutaneous three times a day before meals  metoprolol succinate ER 50 milliGRAM(s) Oral daily  sacubitril 49 mG/valsartan 51 mG 1 Tablet(s) Oral two times a day    MEDICATIONS  (PRN):  dextrose Oral Gel 15 Gram(s) Oral once PRN Blood Glucose LESS THAN 70 milliGRAM(s)/deciliter  guaiFENesin Oral Liquid (Sugar-Free) 100 milliGRAM(s) Oral every 6 hours PRN Cough          Vital Signs Last 24 Hrs  T(C): 36.6 (13 Jul 2023 08:09), Max: 37.8 (12 Jul 2023 21:33)  T(F): 97.9 (13 Jul 2023 08:09), Max: 100 (12 Jul 2023 21:33)  HR: 61 (13 Jul 2023 08:09) (58 - 78)  BP: 106/68 (13 Jul 2023 08:09) (100/63 - 124/78)  BP(mean): --  RR: 18 (13 Jul 2023 08:09) (18 - 18)  SpO2: 98% (13 Jul 2023 08:09) (96% - 98%)    Parameters below as of 13 Jul 2023 08:09  Patient On (Oxygen Delivery Method): room air              07-12 @ 07:01  -  07-13 @ 07:00  --------------------------------------------------------  IN: 900 mL / OUT: 200 mL / NET: 700 mL          LABS:    07-12    134<L>  |  103  |  21  ----------------------------<  130<H>  4.7   |  21<L>  |  0.87    Ca    9.7      12 Jul 2023 07:14            CAPILLARY BLOOD GLUCOSE      POCT Blood Glucose.: 127 mg/dL (13 Jul 2023 09:17)      Urinalysis Basic - ( 12 Jul 2023 07:14 )    Color: x / Appearance: x / SG: x / pH: x  Gluc: 130 mg/dL / Ketone: x  / Bili: x / Urobili: x   Blood: x / Protein: x / Nitrite: x   Leuk Esterase: x / RBC: x / WBC x   Sq Epi: x / Non Sq Epi: x / Bacteria: x            Physical Examination:  PULM: CTA bilaterally   CVS: S1, S2 heard        RADIOLOGY REVIEWED  CXR 7/3: grossly clear     CT chest: < from: CT Chest No Cont (06.27.23 @ 11:01) >  FINDINGS:    LYMPH NODES: No lymphadenopathy.    HEART/VASCULATURE: The heart is normal in size. Aortic and coronary   artery calcifications. No pericardial effusion. Bilateral partially   calcified pleural plaques compatible with asbestos exposure.    AIRWAYS/LUNGS/PLEURA: The central airways are patent. Bilateral lower   lobe dependent linear opacities. Right upper lobe subsolid nodule   measures 6 mm (3-47). Right upper lobe perifissural ground glass nodule   measures 5 mm (7-144, 3-60). Mild peribronchovascular groundglass in the   lateral segment of the right middle lobe. No pleural effusion or   pneumothorax.    UPPER ABDOMEN: Unremarkable.    BONES/SOFT TISSUES: Mild degenerative changes.    IMPRESSION:    Bilateral lower lobe dependent atelectasis.    Mild right middle lobe groundglass and small right upper lobe ground   glass nodules which maybe inflammatory. Recommend CT chest in 3 months   to see if they persist.    < end of copied text >      TTE: < from: TTE W or WO Ultrasound Enhancing Agent (06.27.23 @ 14:05) >  CONCLUSIONS:      1. Normal left ventricular cavity size. The left ventricular wall thickness is normal. The left ventricular systolic function is severely decreased with an ejection fraction visually estimated at 30 to 35 %. There are regional wall motion abnormalities No evidence of a thrombus in the left ventricle.   2. Multiple segmental abnormalities exist. See findings.   3. There is mild (grade 1) left ventricular diastolic dysfunction, with normal filling pressure.   4. Normal right ventricular cavity size, normal right ventricular wall thickness and normal right ventricular systolic function. The tricuspid annular plane systolic excursion (TAPSE) is 2.1 cm (normal >=1.7 cm).   5. The right atrium is normal.   6. No pericardial effusion seen.   7. No prior echocardiogram is available for comparison.   8. Symmetric mitral valve leaflet tethering.   9. There is normal LV mass and normal geometry.    ________________________________________________________________________________________  FINDINGS:     Left Ventricle:  Normal left ventricular cavity size. The left ventricular wall thickness is normal. The left ventricular systolic function is severely decreased with an ejection fraction visually estimated at 30 to 35%. There are regional wall motion abnormalities consistent with ischemic heart disease. There is mild (grade 1) left ventricular diastolic dysfunction, with normal filling pressure. There is normal LV mass and normal geometry. There is no evidence of a thrombus in the left ventricle.  LV Wall Scoring: The apex is akinetic. The mid and apical anterior septum, mid  inferolateral segment, apical lateral segment, apical anterior segment, and  basal inferior segment are hypokinetic. All remaining scored segments are  normal.          Right Ventricle:  Normal right ventricular cavity size, normal wall thickness and normal right ventricular systolic function. Tricuspid annular plane systolic excursion (TAPSE) is 2.1 cm (normal >=1.7 cm).     Left Atrium:  The left atrium is normal.     Right Atrium:  The right atrium is normal.     Aortic Valve:  The aortic valve is tricuspid with normal structure without stenosis. There is no evidence of aortic regurgitation.     Mitral Valve:  Structurally normal mitral valve with normal leaflet opening and closing, without any evidence of mitral stenosis or significant regurgitation. There is symmetric leaflet tethering. There is trace mitral regurgitation.    Tricuspid Valve:  Structurally normal tricuspid valve with normal leaflet excursion. There is trace tricuspid regurgitation.     Pulmonic Valve:  Structurally normal pulmonic valve with normal leaflet excursion. There is trace pulmonic regurgitation.     Aorta:  The aortic annulus and aortic root appear normal in size. Normal aorta sinus of Valsalva, measuring 3.20 cm (indexed 2.05 cm/m²).     Pericardium:  No pericardial effusion seen.  ____________________________________________________________________  Quantitative Data:  Left Ventricle Measurements: (Indexed to BSA)     IVSd (2D):   0.9 cm  LVPWd (2D):  0.9 cm  LVIDd (2D):  4.9 cm  LVIDs (2D):  4.1 cm  LV Mass:     151 g  96.9 g/m²  Visualized LV EF%: 30 to 35%     MV E Vmax:    0.57 m/s  MV A Vmax:    1.16 m/s  MV E/A:       0.49  e' lateral:   5.98 cm/s  e' medial:    4.13 cm/s  E/e' lateral: 9.55  E/e' medial:  13.83  E/e' Average: 11.30    Aorta Measurements:     Ao Sinus:      3.2 cm  Ao Root:       3.2 cm  Ao Root s, 2D: 3.2 cm       Left Atrium Measurements: (Indexed to BSA)  LA Diam 2D: 3.40 cm    Right Ventricle Measurements:     TAPSE: 2.1 cm       LVOT / RVOT/ Qp/Qs Data: (Indexed to BSA)  Mitral Valve Measurements:     MV E Vmax: 0.6 m/s  MV A Vmax: 1.2 m/s  MV E/A:   0.5       --------------------------------------------------------------------------------  [employer]ec:  LV Analysis:  EF: 25 %      < end of copied text >

## 2023-07-13 NOTE — PROGRESS NOTE ADULT - ASSESSMENT
73M with PMH of DM, HTN, CAD s/p 3 stents. Presents with cough for the past 2 weeks. CXR with small L basilar opacity. Also noted to be in hypertensive urgency in ED. ENT was consulted for revaluation of left ear fullness.  Left ear + cerumen - Right ear + cerumen (removed at bedside)  73M with PMH of DM, HTN, CAD s/p 3 stents. Presents with cough for the past 2 weeks. CXR with small L basilar opacity, now s/p cardiac cath 6/29. Also noted to be in hypertensive urgency in ED. ENT was consulted for revaluation of left ear fullness.  Left ear + cerumen - Right ear + cerumen (removed at bedside) + hearing loss suddenly prior to admission.   73M with PMH of DM, HTN, CAD s/p 3 stents. Presents with cough for the past 2 weeks. CXR with small L basilar opacity, now s/p cardiac cath 6/29. Also noted to be in hypertensive urgency in ED. ENT consulted for left ear fullness.  Patient states he noticed a decreased in his left hearing since 7/5/23. Patient states he knows he has baseline hearing loss before this decreased.    Physical exam shows bilateral Left ear cerumen impaction. Removed at bedside.    Despite removal of cerumen patient states still slight left hearing decrease

## 2023-07-13 NOTE — PROGRESS NOTE ADULT - PROBLEM SELECTOR PLAN 1
acute bronchitis - CT chest with no clear PNA, few GGO/nodules  -Small L basilar opacity seen on CXR corresponds to atelectasis seen on CT chest   -Cough x 2 weeks, rhonchi L base (now improving)  -S/p Ceftriaxone x 5 days   -Normoxic, keep sats >90% with o2 PRN.  -Cough resolved. Robitussin q6h PRN.  -Duoneb d/c'd

## 2023-07-13 NOTE — PROGRESS NOTE ADULT - SUBJECTIVE AND OBJECTIVE BOX
ENT ISSUE/POD: Left ear fullness    HPI: 72 YO mostly Turkmen speaking M with PMH of DM, HTN, CAD s/p 3 stents. Presents with cough for the past 2 weeks. CXR with small L basilar opacity. Also noted to be in hypertensive urgency in ED.     ENT was consulted for revaluation of left ear fullness   Per pt, pain started 1  week prior to admission.   Pt recently received 5 days of debrox drops - now with continued pain      PAST MEDICAL & SURGICAL HISTORY:  HTN (hypertension)      DM (diabetes mellitus)        Allergies    No Known Allergies    Intolerances      MEDICATIONS  (STANDING):  aspirin enteric coated 81 milliGRAM(s) Oral daily  atorvastatin 80 milliGRAM(s) Oral at bedtime  clopidogrel Tablet 75 milliGRAM(s) Oral daily  dextrose 5%. 1000 milliLiter(s) (100 mL/Hr) IV Continuous <Continuous>  dextrose 5%. 1000 milliLiter(s) (50 mL/Hr) IV Continuous <Continuous>  dextrose 50% Injectable 25 Gram(s) IV Push once  dextrose 50% Injectable 25 Gram(s) IV Push once  dextrose 50% Injectable 12.5 Gram(s) IV Push once  glucagon  Injectable 1 milliGRAM(s) IntraMuscular once  heparin   Injectable 5000 Unit(s) SubCutaneous every 12 hours  insulin glargine Injectable (LANTUS) 16 Unit(s) SubCutaneous at bedtime  insulin lispro (ADMELOG) corrective regimen sliding scale   SubCutaneous at bedtime  insulin lispro (ADMELOG) corrective regimen sliding scale   SubCutaneous three times a day before meals  insulin lispro Injectable (ADMELOG) 5 Unit(s) SubCutaneous three times a day before meals  metoprolol succinate ER 50 milliGRAM(s) Oral daily  sacubitril 49 mG/valsartan 51 mG 1 Tablet(s) Oral two times a day    MEDICATIONS  (PRN):  dextrose Oral Gel 15 Gram(s) Oral once PRN Blood Glucose LESS THAN 70 milliGRAM(s)/deciliter  guaiFENesin Oral Liquid (Sugar-Free) 100 milliGRAM(s) Oral every 6 hours PRN Cough      Social History: **??**    Family history: **??**    ROS:   ENT: all negative except as noted in HPI   Pulm: denies SOB, cough, hemoptysis  Neuro: denies numbness/tingling, loss of sensation  Endo: denies heat/cold intolerance, excessive sweating      Vital Signs Last 24 Hrs  T(C): 36.3 (13 Jul 2023 13:19), Max: 37.8 (12 Jul 2023 21:33)  T(F): 97.3 (13 Jul 2023 13:19), Max: 100 (12 Jul 2023 21:33)  HR: 63 (13 Jul 2023 13:19) (58 - 78)  BP: 103/61 (13 Jul 2023 13:19) (103/61 - 124/78)  BP(mean): --  RR: 18 (13 Jul 2023 13:19) (18 - 18)  SpO2: 97% (13 Jul 2023 13:19) (96% - 98%)    Parameters below as of 13 Jul 2023 13:19  Patient On (Oxygen Delivery Method): room air         07-12    134<L>  |  103  |  21  ----------------------------<  130<H>  4.7   |  21<L>  |  0.87    Ca    9.7      12 Jul 2023 07:14         PHYSICAL EXAM:  Gen: NAD  Skin: No rashes, bruises, or lesions  Head: Normocephalic, Atraumatic  Face: no edema, erythema, or fluctuance. Parotid glands soft without mass  Eyes: no scleral injection  Ears: Right - ear canal clear, TM intact without effusion or erythema. No evidence of any fluid drainage. No mastoid tenderness, erythema, or ear bulging            Left - ear canal clear, TM intact without effusion or erythema. No evidence of any fluid drainage. No mastoid tenderness, erythema, or ear bulging  Nose: Nares bilaterally patent, no discharge  Mouth: No Stridor / Drooling / Trismus.  Mucosa moist, tongue/uvula midline, oropharynx clear  Neck: Flat, supple, no lymphadenopathy, trachea midline, no masses  Lymphatic: No lymphadenopathy  Resp: breathing easily, no stridor  Neuro: facial nerve intact, no facial droop         ENT ISSUE/POD: Left ear fullness    HPI: 73M with PMH of DM, HTN, CAD s/p 3 stents. Presents with cough for the past 2 weeks. CXR with small L basilar opacity. Also noted to be in hypertensive urgency in ED. ENT was consulted for revaluation of left ear fullness. Per pt, pain started 1  week prior to admission.   Pt recently received drops prior after ENT recommendations made on 6/29, but had to stop these drops for heart procedure and per EMR received debrox ear drops from 6/29-7/1. Pt c/o continued Left ear fullness, no ringing and no dizziness. Pt reports hard of hearing but had this prior to admission.      PAST MEDICAL & SURGICAL HISTORY:  HTN (hypertension)      DM (diabetes mellitus)        Allergies    No Known Allergies    Intolerances      MEDICATIONS  (STANDING):  aspirin enteric coated 81 milliGRAM(s) Oral daily  atorvastatin 80 milliGRAM(s) Oral at bedtime  clopidogrel Tablet 75 milliGRAM(s) Oral daily  dextrose 5%. 1000 milliLiter(s) (100 mL/Hr) IV Continuous <Continuous>  dextrose 5%. 1000 milliLiter(s) (50 mL/Hr) IV Continuous <Continuous>  dextrose 50% Injectable 25 Gram(s) IV Push once  dextrose 50% Injectable 25 Gram(s) IV Push once  dextrose 50% Injectable 12.5 Gram(s) IV Push once  glucagon  Injectable 1 milliGRAM(s) IntraMuscular once  heparin   Injectable 5000 Unit(s) SubCutaneous every 12 hours  insulin glargine Injectable (LANTUS) 16 Unit(s) SubCutaneous at bedtime  insulin lispro (ADMELOG) corrective regimen sliding scale   SubCutaneous at bedtime  insulin lispro (ADMELOG) corrective regimen sliding scale   SubCutaneous three times a day before meals  insulin lispro Injectable (ADMELOG) 5 Unit(s) SubCutaneous three times a day before meals  metoprolol succinate ER 50 milliGRAM(s) Oral daily  sacubitril 49 mG/valsartan 51 mG 1 Tablet(s) Oral two times a day    MEDICATIONS  (PRN):  dextrose Oral Gel 15 Gram(s) Oral once PRN Blood Glucose LESS THAN 70 milliGRAM(s)/deciliter  guaiFENesin Oral Liquid (Sugar-Free) 100 milliGRAM(s) Oral every 6 hours PRN Cough           ROS:   ENT: all negative except as noted in HPI   Pulm: denies SOB, cough, hemoptysis  Neuro: denies numbness/tingling, loss of sensation  Endo: denies heat/cold intolerance, excessive sweating      Vital Signs Last 24 Hrs  T(C): 36.3 (13 Jul 2023 13:19), Max: 37.8 (12 Jul 2023 21:33)  T(F): 97.3 (13 Jul 2023 13:19), Max: 100 (12 Jul 2023 21:33)  HR: 63 (13 Jul 2023 13:19) (58 - 78)  BP: 103/61 (13 Jul 2023 13:19) (103/61 - 124/78)  BP(mean): --  RR: 18 (13 Jul 2023 13:19) (18 - 18)  SpO2: 97% (13 Jul 2023 13:19) (96% - 98%)    Parameters below as of 13 Jul 2023 13:19  Patient On (Oxygen Delivery Method): room air         07-12    134<L>  |  103  |  21  ----------------------------<  130<H>  4.7   |  21<L>  |  0.87    Ca    9.7      12 Jul 2023 07:14         PHYSICAL EXAM:  Gen: NAD  Skin: No rashes, bruises, or lesions  Head: Normocephalic, Atraumatic  Face: no edema, erythema, or fluctuance. Parotid glands soft without mass  Eyes: no scleral injection  Ears: Right - ear canal + cerumen (removed at bedside)  TM intact without effusion or erythema. No evidence of any fluid drainage. No mastoid tenderness, erythema, or ear bulging            Left -  + cerumen, partially able to visualize the EM, no mastoid tenderness.   Nose: Nares bilaterally patent, no discharge  Mouth: No Stridor / Drooling / Trismus.    Neck: Flat, supple   Lymphatic: No lymphadenopathy  Resp: breathing easily, no stridor            ENT ISSUE/POD: Left ear fullness    HPI: 73M with PMH of DM, HTN, CAD s/p 3 stents. Presents with cough for the past 2 weeks. CXR with small L basilar opacity. Also noted to be in hypertensive urgency in ED. ENT was consulted for revaluation of left ear fullness. Per pt, pain started 1  week prior to admission.   Pt recently received drops prior after ENT recommendations made on 6/29, but had to stop these drops for heart procedure and per EMR received debrox ear drops from 6/29-7/1. Pt c/o continued Left ear fullness  Pt reports hard of hearing that started suddenly prior to admission.      PAST MEDICAL & SURGICAL HISTORY:  HTN (hypertension)      DM (diabetes mellitus)        Allergies    No Known Allergies    Intolerances      MEDICATIONS  (STANDING):  aspirin enteric coated 81 milliGRAM(s) Oral daily  atorvastatin 80 milliGRAM(s) Oral at bedtime  clopidogrel Tablet 75 milliGRAM(s) Oral daily  dextrose 5%. 1000 milliLiter(s) (100 mL/Hr) IV Continuous <Continuous>  dextrose 5%. 1000 milliLiter(s) (50 mL/Hr) IV Continuous <Continuous>  dextrose 50% Injectable 25 Gram(s) IV Push once  dextrose 50% Injectable 25 Gram(s) IV Push once  dextrose 50% Injectable 12.5 Gram(s) IV Push once  glucagon  Injectable 1 milliGRAM(s) IntraMuscular once  heparin   Injectable 5000 Unit(s) SubCutaneous every 12 hours  insulin glargine Injectable (LANTUS) 16 Unit(s) SubCutaneous at bedtime  insulin lispro (ADMELOG) corrective regimen sliding scale   SubCutaneous at bedtime  insulin lispro (ADMELOG) corrective regimen sliding scale   SubCutaneous three times a day before meals  insulin lispro Injectable (ADMELOG) 5 Unit(s) SubCutaneous three times a day before meals  metoprolol succinate ER 50 milliGRAM(s) Oral daily  sacubitril 49 mG/valsartan 51 mG 1 Tablet(s) Oral two times a day    MEDICATIONS  (PRN):  dextrose Oral Gel 15 Gram(s) Oral once PRN Blood Glucose LESS THAN 70 milliGRAM(s)/deciliter  guaiFENesin Oral Liquid (Sugar-Free) 100 milliGRAM(s) Oral every 6 hours PRN Cough           ROS:   ENT: all negative except as noted in HPI   Pulm: denies SOB, cough, hemoptysis  Neuro: denies numbness/tingling, loss of sensation  Endo: denies heat/cold intolerance, excessive sweating      Vital Signs Last 24 Hrs  T(C): 36.3 (13 Jul 2023 13:19), Max: 37.8 (12 Jul 2023 21:33)  T(F): 97.3 (13 Jul 2023 13:19), Max: 100 (12 Jul 2023 21:33)  HR: 63 (13 Jul 2023 13:19) (58 - 78)  BP: 103/61 (13 Jul 2023 13:19) (103/61 - 124/78)  BP(mean): --  RR: 18 (13 Jul 2023 13:19) (18 - 18)  SpO2: 97% (13 Jul 2023 13:19) (96% - 98%)    Parameters below as of 13 Jul 2023 13:19  Patient On (Oxygen Delivery Method): room air         07-12    134<L>  |  103  |  21  ----------------------------<  130<H>  4.7   |  21<L>  |  0.87    Ca    9.7      12 Jul 2023 07:14         PHYSICAL EXAM:  Gen: NAD  Skin: No rashes, bruises, or lesions  Head: Normocephalic, Atraumatic  Face: no edema, erythema, or fluctuance. Parotid glands soft without mass  Eyes: no scleral injection  Ears: Right - ear canal + cerumen (removed at bedside)  TM intact without effusion or erythema. No evidence of any fluid drainage. No mastoid tenderness, erythema, or ear bulging            Left -  + cerumen, partially able to visualize the EM, no mastoid tenderness.   Nose: Nares bilaterally patent, no discharge  Mouth: No Stridor / Drooling / Trismus.    Neck: Flat, supple   Lymphatic: No lymphadenopathy  Resp: breathing easily, no stridor            ENT ISSUE/POD: Left ear fullness    HPI: 73M with PMH of DM, HTN, CAD s/p 3 stents. Presents with cough for the past 2 weeks. CXR with small L basilar opacity. Also noted to be in hypertensive urgency in ED. ENT was consulted for revaluation of left ear fullness. Per pt, pain started 1  week prior to admission.   Pt recently received drops prior after ENT recommendations made on 6/29, but had to stop these drops for heart procedure and per EMR received debrox ear drops from 6/29-7/1. Pt c/o continued Left ear fullness  Pt reports hard of hearing that started suddenly prior to admission.      PAST MEDICAL & SURGICAL HISTORY:  HTN (hypertension)      DM (diabetes mellitus)        Allergies    No Known Allergies    Intolerances      MEDICATIONS  (STANDING):  aspirin enteric coated 81 milliGRAM(s) Oral daily  atorvastatin 80 milliGRAM(s) Oral at bedtime  clopidogrel Tablet 75 milliGRAM(s) Oral daily  dextrose 5%. 1000 milliLiter(s) (100 mL/Hr) IV Continuous <Continuous>  dextrose 5%. 1000 milliLiter(s) (50 mL/Hr) IV Continuous <Continuous>  dextrose 50% Injectable 25 Gram(s) IV Push once  dextrose 50% Injectable 25 Gram(s) IV Push once  dextrose 50% Injectable 12.5 Gram(s) IV Push once  glucagon  Injectable 1 milliGRAM(s) IntraMuscular once  heparin   Injectable 5000 Unit(s) SubCutaneous every 12 hours  insulin glargine Injectable (LANTUS) 16 Unit(s) SubCutaneous at bedtime  insulin lispro (ADMELOG) corrective regimen sliding scale   SubCutaneous at bedtime  insulin lispro (ADMELOG) corrective regimen sliding scale   SubCutaneous three times a day before meals  insulin lispro Injectable (ADMELOG) 5 Unit(s) SubCutaneous three times a day before meals  metoprolol succinate ER 50 milliGRAM(s) Oral daily  sacubitril 49 mG/valsartan 51 mG 1 Tablet(s) Oral two times a day    MEDICATIONS  (PRN):  dextrose Oral Gel 15 Gram(s) Oral once PRN Blood Glucose LESS THAN 70 milliGRAM(s)/deciliter  guaiFENesin Oral Liquid (Sugar-Free) 100 milliGRAM(s) Oral every 6 hours PRN Cough           ROS:   ENT: all negative except as noted in HPI   Pulm: denies SOB, cough, hemoptysis  Neuro: denies numbness/tingling, loss of sensation  Endo: denies heat/cold intolerance, excessive sweating      Vital Signs Last 24 Hrs  T(C): 36.3 (13 Jul 2023 13:19), Max: 37.8 (12 Jul 2023 21:33)  T(F): 97.3 (13 Jul 2023 13:19), Max: 100 (12 Jul 2023 21:33)  HR: 63 (13 Jul 2023 13:19) (58 - 78)  BP: 103/61 (13 Jul 2023 13:19) (103/61 - 124/78)  BP(mean): --  RR: 18 (13 Jul 2023 13:19) (18 - 18)  SpO2: 97% (13 Jul 2023 13:19) (96% - 98%)    Parameters below as of 13 Jul 2023 13:19  Patient On (Oxygen Delivery Method): room air         07-12    134<L>  |  103  |  21  ----------------------------<  130<H>  4.7   |  21<L>  |  0.87    Ca    9.7      12 Jul 2023 07:14         PHYSICAL EXAM:  Gen: NAD  Skin: No rashes, bruises, or lesions  Head: Normocephalic, Atraumatic  Face: no edema, erythema, or fluctuance. Parotid glands soft without mass  Eyes: no scleral injection  Ears: Right - ear canal + cerumen (removed at bedside)  TM intact without effusion or erythema. No evidence of any fluid drainage. No mastoid tenderness, erythema, or ear bulging            Left -  + cerumen, partially able to visualize the EM, no mastoid tenderness.   Nose: Nares bilaterally patent, no discharge  Mouth: No Stridor / Drooling / Trismus.    Neck: Flat, supple   Lymphatic: No lymphadenopathy  Resp: breathing easily, no stridor           History: Cerumen  Pre- Op Dx: Cerumen Impaction.   Post Op Dx: same as the Pre Op Dx.   Anesthetics: none   Procedure: Otoscope (Bilateral). Removal of Cerumen Bilateral  Findings: Cerumen was removed under otoscope with a combination of a suction, and/or a loop curette. The patient tolerated the procedure well and there were no complications. Findings are described in Physical Exam

## 2023-07-13 NOTE — PROGRESS NOTE ADULT - SUBJECTIVE AND OBJECTIVE BOX
SUBJ:    no cp  c/o ear fullness, leg weakness    Home Medications:  amLODIPine 2.5 mg oral tablet: 1 tab(s) orally once a day (2023 17:58)  aspirin 81 mg oral delayed release tablet: 1 tab(s) orally once a day (2023 17:58)  losartan 100 mg oral tablet: 1 tab(s) orally once a day (2023 17:58)  metFORMIN 850 mg oral tablet: 1 tab(s) orally 2 times a day (2023 17:58)  metoprolol 50m tablet orally once a day (2023 17:58)      MEDICATIONS  (STANDING):  aspirin enteric coated 81 milliGRAM(s) Oral daily  atorvastatin 80 milliGRAM(s) Oral at bedtime  clopidogrel Tablet 75 milliGRAM(s) Oral daily  dextrose 5%. 1000 milliLiter(s) (100 mL/Hr) IV Continuous <Continuous>  dextrose 5%. 1000 milliLiter(s) (50 mL/Hr) IV Continuous <Continuous>  dextrose 50% Injectable 25 Gram(s) IV Push once  dextrose 50% Injectable 25 Gram(s) IV Push once  dextrose 50% Injectable 12.5 Gram(s) IV Push once  glucagon  Injectable 1 milliGRAM(s) IntraMuscular once  heparin   Injectable 5000 Unit(s) SubCutaneous every 12 hours  insulin glargine Injectable (LANTUS) 16 Unit(s) SubCutaneous at bedtime  insulin lispro (ADMELOG) corrective regimen sliding scale   SubCutaneous at bedtime  insulin lispro (ADMELOG) corrective regimen sliding scale   SubCutaneous three times a day before meals  insulin lispro Injectable (ADMELOG) 5 Unit(s) SubCutaneous three times a day before meals  metoprolol succinate ER 50 milliGRAM(s) Oral daily  sacubitril 49 mG/valsartan 51 mG 1 Tablet(s) Oral two times a day    MEDICATIONS  (PRN):  dextrose Oral Gel 15 Gram(s) Oral once PRN Blood Glucose LESS THAN 70 milliGRAM(s)/deciliter  guaiFENesin Oral Liquid (Sugar-Free) 100 milliGRAM(s) Oral every 6 hours PRN Cough      Vital Signs Last 24 Hrs  T(C): 37.3 (2023 17:10), Max: 37.8 (2023 21:33)  T(F): 99.1 (2023 17:10), Max: 100 (2023 21:33)  HR: 69 (2023 17:10) (60 - 78)  BP: 113/72 (2023 17:10) (103/61 - 124/78)  BP(mean): --  RR: 18 (2023 17:10) (18 - 18)  SpO2: 97% (2023 17:10) (96% - 98%)    Parameters below as of 2023 17:10  Patient On (Oxygen Delivery Method): room air        REVIEW OF SYSTEMS:  As per HPI, otherwise unremarkable.     PHYSICAL EXAM:  Constitutional/Appearance: Normal, Well-developed  HEENT:   Normal oral mucosa, no drainage or redness, supple neck  Lymphatic: No lymphadenopathy  Cardiovascular: Normal S1 S2, No edema, RRR  Respiratory: Lungs clear to auscultation, respirations non-labored  Psychiatry: A & O x 3, appropriate affect.   Gastrointestinal:  Soft, Non-tender, no distention  Skin: No rashes, No ecchymoses, No cyanosis	  Neurologic: Non-focal, Alert and oriented x 3  Extremities: Normal range of motion  Vascular: Peripheral pulses palpable 2+ bilaterally (radial)    TELEMETRY:  sinus      07-12    134<L>  |  103  |  21  ----------------------------<  130<H>  4.7   |  21<L>  |  0.87    Ca    9.7      2023 07:14      IMPRESSION AND PLAN:  72 yo with a PMH of CAD s/p 3 stents, HTN, T2DM, admitted for pneumonia, cardiology consulted for ST depression and LVH noted on EKG.  EF 30-35%  LHC showed TVD. CP free, euvolemic. s/p L Leg weakness and ear fullness      CHF   TTE w/ EF 30-35%.   - euvolemic on exam  - Continue entresto 49/51mg bid   - Entresto cost is $600/mth--> will obtain free meds for 30days but working with social work to obtain meds via a program [facility]?  - Continue metop succinate 50mg daily  - start aldactone 25 mg po qd for routine CHF care.--> follow K    CAD  - continue Aspirin, Plavix  atorva 80.  - Cath 7/10 with Complex PCI with impella with Adriana Gross-->s/p PCT to p OM, PCI to mRCA  - right groin clean    L Leg imbalance post cath    Neuro w/u in progress    Ear fullness  ENT w/u in progress    d/w family     Zohreh Burr MD  Cardiology Attending

## 2023-07-14 LAB
GLUCOSE BLDC GLUCOMTR-MCNC: 103 MG/DL — HIGH (ref 70–99)
GLUCOSE BLDC GLUCOMTR-MCNC: 134 MG/DL — HIGH (ref 70–99)
GLUCOSE BLDC GLUCOMTR-MCNC: 160 MG/DL — HIGH (ref 70–99)
GLUCOSE BLDC GLUCOMTR-MCNC: 183 MG/DL — HIGH (ref 70–99)

## 2023-07-14 PROCEDURE — 99253 IP/OBS CNSLTJ NEW/EST LOW 45: CPT

## 2023-07-14 PROCEDURE — 99233 SBSQ HOSP IP/OBS HIGH 50: CPT

## 2023-07-14 PROCEDURE — 70551 MRI BRAIN STEM W/O DYE: CPT | Mod: 26

## 2023-07-14 RX ORDER — METOPROLOL TARTRATE 50 MG
0 TABLET ORAL
Refills: 0 | DISCHARGE

## 2023-07-14 RX ORDER — VALSARTAN 80 MG/1
1 TABLET ORAL
Qty: 30 | Refills: 0
Start: 2023-07-14 | End: 2023-08-12

## 2023-07-14 RX ORDER — SPIRONOLACTONE 25 MG/1
1 TABLET, FILM COATED ORAL
Qty: 30 | Refills: 0
Start: 2023-07-14 | End: 2023-08-12

## 2023-07-14 RX ORDER — SPIRONOLACTONE 25 MG/1
25 TABLET, FILM COATED ORAL DAILY
Refills: 0 | Status: DISCONTINUED | OUTPATIENT
Start: 2023-07-14 | End: 2023-07-17

## 2023-07-14 RX ORDER — SACUBITRIL AND VALSARTAN 24; 26 MG/1; MG/1
1 TABLET, FILM COATED ORAL
Qty: 60 | Refills: 0
Start: 2023-07-14 | End: 2023-08-12

## 2023-07-14 RX ORDER — AMLODIPINE BESYLATE 2.5 MG/1
1 TABLET ORAL
Refills: 0 | DISCHARGE

## 2023-07-14 RX ORDER — ASPIRIN/CALCIUM CARB/MAGNESIUM 324 MG
1 TABLET ORAL
Refills: 0 | DISCHARGE

## 2023-07-14 RX ORDER — LOSARTAN POTASSIUM 100 MG/1
1 TABLET, FILM COATED ORAL
Refills: 0 | DISCHARGE

## 2023-07-14 RX ORDER — ATORVASTATIN CALCIUM 80 MG/1
2 TABLET, FILM COATED ORAL
Qty: 60 | Refills: 0
Start: 2023-07-14 | End: 2023-08-12

## 2023-07-14 RX ORDER — CLOPIDOGREL BISULFATE 75 MG/1
1 TABLET, FILM COATED ORAL
Qty: 30 | Refills: 0
Start: 2023-07-14 | End: 2023-08-12

## 2023-07-14 RX ORDER — VALSARTAN 80 MG/1
80 TABLET ORAL DAILY
Refills: 0 | Status: DISCONTINUED | OUTPATIENT
Start: 2023-07-14 | End: 2023-07-17

## 2023-07-14 RX ORDER — ATORVASTATIN CALCIUM 80 MG/1
1 TABLET, FILM COATED ORAL
Qty: 30 | Refills: 0
Start: 2023-07-14 | End: 2023-08-12

## 2023-07-14 RX ORDER — ASPIRIN/CALCIUM CARB/MAGNESIUM 324 MG
1 TABLET ORAL
Qty: 30 | Refills: 0
Start: 2023-07-14 | End: 2023-08-12

## 2023-07-14 RX ORDER — METOPROLOL TARTRATE 50 MG
1 TABLET ORAL
Qty: 30 | Refills: 0
Start: 2023-07-14 | End: 2023-08-12

## 2023-07-14 RX ADMIN — Medication 5 UNIT(S): at 18:09

## 2023-07-14 RX ADMIN — ATORVASTATIN CALCIUM 80 MILLIGRAM(S): 80 TABLET, FILM COATED ORAL at 22:54

## 2023-07-14 RX ADMIN — INSULIN GLARGINE 16 UNIT(S): 100 INJECTION, SOLUTION SUBCUTANEOUS at 22:54

## 2023-07-14 RX ADMIN — Medication 50 MILLIGRAM(S): at 05:39

## 2023-07-14 RX ADMIN — Medication 5 UNIT(S): at 12:44

## 2023-07-14 RX ADMIN — Medication 81 MILLIGRAM(S): at 12:47

## 2023-07-14 RX ADMIN — Medication 1: at 18:44

## 2023-07-14 RX ADMIN — CLOPIDOGREL BISULFATE 75 MILLIGRAM(S): 75 TABLET, FILM COATED ORAL at 12:47

## 2023-07-14 RX ADMIN — SACUBITRIL AND VALSARTAN 1 TABLET(S): 24; 26 TABLET, FILM COATED ORAL at 05:38

## 2023-07-14 RX ADMIN — Medication 5 UNIT(S): at 09:20

## 2023-07-14 RX ADMIN — HEPARIN SODIUM 5000 UNIT(S): 5000 INJECTION INTRAVENOUS; SUBCUTANEOUS at 18:08

## 2023-07-14 RX ADMIN — HEPARIN SODIUM 5000 UNIT(S): 5000 INJECTION INTRAVENOUS; SUBCUTANEOUS at 05:39

## 2023-07-14 NOTE — PROGRESS NOTE ADULT - SUBJECTIVE AND OBJECTIVE BOX
Follow-up Pulm Progress Note    c/o L ear fullness   No new respiratory events overnight.  Denies SOB/CP.     Medications:  MEDICATIONS  (STANDING):  aspirin enteric coated 81 milliGRAM(s) Oral daily  atorvastatin 80 milliGRAM(s) Oral at bedtime  clopidogrel Tablet 75 milliGRAM(s) Oral daily  dextrose 5%. 1000 milliLiter(s) (100 mL/Hr) IV Continuous <Continuous>  dextrose 5%. 1000 milliLiter(s) (50 mL/Hr) IV Continuous <Continuous>  dextrose 50% Injectable 12.5 Gram(s) IV Push once  dextrose 50% Injectable 25 Gram(s) IV Push once  dextrose 50% Injectable 25 Gram(s) IV Push once  glucagon  Injectable 1 milliGRAM(s) IntraMuscular once  heparin   Injectable 5000 Unit(s) SubCutaneous every 12 hours  insulin glargine Injectable (LANTUS) 16 Unit(s) SubCutaneous at bedtime  insulin lispro (ADMELOG) corrective regimen sliding scale   SubCutaneous at bedtime  insulin lispro (ADMELOG) corrective regimen sliding scale   SubCutaneous three times a day before meals  insulin lispro Injectable (ADMELOG) 5 Unit(s) SubCutaneous three times a day before meals  metoprolol succinate ER 50 milliGRAM(s) Oral daily  sacubitril 49 mG/valsartan 51 mG 1 Tablet(s) Oral two times a day    MEDICATIONS  (PRN):  dextrose Oral Gel 15 Gram(s) Oral once PRN Blood Glucose LESS THAN 70 milliGRAM(s)/deciliter  guaiFENesin Oral Liquid (Sugar-Free) 100 milliGRAM(s) Oral every 6 hours PRN Cough          Vital Signs Last 24 Hrs  T(C): 36.4 (14 Jul 2023 05:31), Max: 37.3 (13 Jul 2023 17:10)  T(F): 97.6 (14 Jul 2023 05:31), Max: 99.1 (13 Jul 2023 17:10)  HR: 62 (14 Jul 2023 05:31) (60 - 69)  BP: 117/62 (14 Jul 2023 05:31) (103/61 - 126/68)  BP(mean): --  RR: 18 (14 Jul 2023 05:31) (18 - 18)  SpO2: 97% (14 Jul 2023 05:31) (95% - 98%)    Parameters below as of 14 Jul 2023 05:31  Patient On (Oxygen Delivery Method): room air              07-13 @ 07:01  -  07-14 @ 07:00  --------------------------------------------------------  IN: 840 mL / OUT: 0 mL / NET: 840 mL        CAPILLARY BLOOD GLUCOSE      POCT Blood Glucose.: 98 mg/dL (13 Jul 2023 22:18)            Physical Examination:  PULM: CTA bilaterally   CVS: S1, S2 heard        RADIOLOGY REVIEWED  CXR 7/3: grossly clear     CT chest: < from: CT Chest No Cont (06.27.23 @ 11:01) >  FINDINGS:    LYMPH NODES: No lymphadenopathy.    HEART/VASCULATURE: The heart is normal in size. Aortic and coronary   artery calcifications. No pericardial effusion. Bilateral partially   calcified pleural plaques compatible with asbestos exposure.    AIRWAYS/LUNGS/PLEURA: The central airways are patent. Bilateral lower   lobe dependent linear opacities. Right upper lobe subsolid nodule   measures 6 mm (3-47). Right upper lobe perifissural ground glass nodule   measures 5 mm (7-144, 3-60). Mild peribronchovascular groundglass in the   lateral segment of the right middle lobe. No pleural effusion or   pneumothorax.    UPPER ABDOMEN: Unremarkable.    BONES/SOFT TISSUES: Mild degenerative changes.    IMPRESSION:    Bilateral lower lobe dependent atelectasis.    Mild right middle lobe groundglass and small right upper lobe ground   glass nodules which maybe inflammatory. Recommend CT chest in 3 months   to see if they persist.    < end of copied text >      TTE: < from: TTE W or WO Ultrasound Enhancing Agent (06.27.23 @ 14:05) >  CONCLUSIONS:      1. Normal left ventricular cavity size. The left ventricular wall thickness is normal. The left ventricular systolic function is severely decreased with an ejection fraction visually estimated at 30 to 35 %. There are regional wall motion abnormalities No evidence of a thrombus in the left ventricle.   2. Multiple segmental abnormalities exist. See findings.   3. There is mild (grade 1) left ventricular diastolic dysfunction, with normal filling pressure.   4. Normal right ventricular cavity size, normal right ventricular wall thickness and normal right ventricular systolic function. The tricuspid annular plane systolic excursion (TAPSE) is 2.1 cm (normal >=1.7 cm).   5. The right atrium is normal.   6. No pericardial effusion seen.   7. No prior echocardiogram is available for comparison.   8. Symmetric mitral valve leaflet tethering.   9. There is normal LV mass and normal geometry.    ________________________________________________________________________________________  FINDINGS:     Left Ventricle:  Normal left ventricular cavity size. The left ventricular wall thickness is normal. The left ventricular systolic function is severely decreased with an ejection fraction visually estimated at 30 to 35%. There are regional wall motion abnormalities consistent with ischemic heart disease. There is mild (grade 1) left ventricular diastolic dysfunction, with normal filling pressure. There is normal LV mass and normal geometry. There is no evidence of a thrombus in the left ventricle.  LV Wall Scoring: The apex is akinetic. The mid and apical anterior septum, mid  inferolateral segment, apical lateral segment, apical anterior segment, and  basal inferior segment are hypokinetic. All remaining scored segments are  normal.          Right Ventricle:  Normal right ventricular cavity size, normal wall thickness and normal right ventricular systolic function. Tricuspid annular plane systolic excursion (TAPSE) is 2.1 cm (normal >=1.7 cm).     Left Atrium:  The left atrium is normal.     Right Atrium:  The right atrium is normal.     Aortic Valve:  The aortic valve is tricuspid with normal structure without stenosis. There is no evidence of aortic regurgitation.     Mitral Valve:  Structurally normal mitral valve with normal leaflet opening and closing, without any evidence of mitral stenosis or significant regurgitation. There is symmetric leaflet tethering. There is trace mitral regurgitation.    Tricuspid Valve:  Structurally normal tricuspid valve with normal leaflet excursion. There is trace tricuspid regurgitation.     Pulmonic Valve:  Structurally normal pulmonic valve with normal leaflet excursion. There is trace pulmonic regurgitation.     Aorta:  The aortic annulus and aortic root appear normal in size. Normal aorta sinus of Valsalva, measuring 3.20 cm (indexed 2.05 cm/m²).     Pericardium:  No pericardial effusion seen.  ____________________________________________________________________  Quantitative Data:  Left Ventricle Measurements: (Indexed to BSA)     IVSd (2D):   0.9 cm  LVPWd (2D):  0.9 cm  LVIDd (2D):  4.9 cm  LVIDs (2D):  4.1 cm  LV Mass:     151 g  96.9 g/m²  Visualized LV EF%: 30 to 35%     MV E Vmax:    0.57 m/s  MV A Vmax:    1.16 m/s  MV E/A:       0.49  e' lateral:   5.98 cm/s  e' medial:    4.13 cm/s  E/e' lateral: 9.55  E/e' medial:  13.83  E/e' Average: 11.30    Aorta Measurements:     Ao Sinus:      3.2 cm  Ao Root:       3.2 cm  Ao Root s, 2D: 3.2 cm       Left Atrium Measurements: (Indexed to BSA)  LA Diam 2D: 3.40 cm    Right Ventricle Measurements:     TAPSE: 2.1 cm       LVOT / RVOT/ Qp/Qs Data: (Indexed to BSA)  Mitral Valve Measurements:     MV E Vmax: 0.6 m/s  MV A Vmax: 1.2 m/s  MV E/A:   0.5       --------------------------------------------------------------------------------  [employer]:  LV Analysis:  EF: 25 %      < end of copied text >

## 2023-07-14 NOTE — PROGRESS NOTE ADULT - ASSESSMENT
73M with CAD s/p 3 stents, HTN, T2DM, admitted for pneumonia, cardiology consulted for ST depressions and LVH noted on EKG.      ST Depressions  Hypertensive urgency resolved    -F/u  TTE noted  cath noted  triple vessel   s/p cath w/ pci    llext weakness  r/o cva  cts noted  mri done   f/u results  neuro f/u for further recs and d/c clearance  vasc eval for mri results    c/w meds    likely pna  bronchitis  s/p abs  nebs  pulm eval  noted    dm  fsg riss  dvt proph      d/c if cleared by neuro

## 2023-07-14 NOTE — PHYSICAL THERAPY INITIAL EVALUATION ADULT - PERTINENT HX OF CURRENT PROBLEM, REHAB EVAL
73M PMH DM, HTN, CAD s/p 3 stents p/w cough productive of green sputum. Pt says the cough started 2 weeks ago. He came from [place] 1 month ago, has some SOB when coughing, feels like coughing more when laying supine. Denies fever, chills, CP, abdominal pain, urinary symptoms, diarrhea, weight gain or weight loss, LE edema. Hospital course: 6/27 CT Chest: Bilateral lower lobe dependent atelectasis. 6/30 [facility] Duplex: No significant hemodynamic stenosis of either carotid artery. 7/3 CXR: No active pulmonary disease. 7/3 MR Cardiac: Global left ventricular hypokinesis with areas of less than 25%   subendocardial late gadolinium enhancement. 7/11 CT Head: no acute hemorrhage or midline shift. Chronic lacunar infarct left basal ganglia. 7/11 CTA Neck:  ECAs, ICAs, no  hemodynamically significant stenosis at ICA origins by NASCET criteria. 7/13 MR Head:  Widespread advanced intracranial   atherosclerosis involves the internal carotid arteries.

## 2023-07-14 NOTE — PROGRESS NOTE ADULT - ASSESSMENT
72 y/o mostly Luxembourgish speaking M with PMH of DM, HTN, CAD s/p 3 stents. Presents with cough for the past 2 weeks. CXR with small L basilar opacity. Also noted to be in hypertensive urgency in ED.

## 2023-07-14 NOTE — PROGRESS NOTE ADULT - PROBLEM SELECTOR PLAN 1
acute bronchitis - CT chest with no clear PNA, few GGO/nodules  -Small L basilar opacity seen on CXR corresponds to atelectasis seen on CT chest   -Cough x 2 weeks, rhonchi L base (now improving)  -S/p Ceftriaxone x 5 days   -Normoxic, keep sats >90% with o2 PRN.  -Cough resolved. Robitussin q6h PRN.  -Duoneb d/c'd on 7/13

## 2023-07-14 NOTE — PROGRESS NOTE ADULT - NS ATTEND AMEND GEN_ALL_CORE FT
ENT follow up for left ear fullness.  Patient states he noticed a decreased in his left hearing since 7/5/23. Patient states he knows he has baseline hearing loss before this recent decreased.    Physical exam shows bilateral Left ear cerumen impaction. Removed at bedside.    Despite removal of cerumen patient states still slight left hearing decrease    Recommend:  - Follow up Audiogram. Pending results may consider steroids if indicated  - ENT continue to follow    Follow up outpatient with Dr. Singleton/Paulina/Yves/Denilson [address] [place] [address] [phone] or [address] [place] [address] [phone].

## 2023-07-14 NOTE — PHYSICAL THERAPY INITIAL EVALUATION ADULT - PHYSICAL ASSIST/NONPHYSICAL ASSIST: STAND/SIT, REHAB EVAL
verbal cues Quality 110: Preventive Care And Screening: Influenza Immunization: Influenza Immunization not Administered for Documented Reasons. Detail Level: Detailed Quality 226: Preventive Care And Screening: Tobacco Use: Screening And Cessation Intervention: Patient screened for tobacco use and is an ex/non-smoker Quality 130: Documentation Of Current Medications In The Medical Record: Current Medications Documented Quality 402: Tobacco Use And Help With Quitting Among Adolescents: Patient screened for tobacco and never smoked

## 2023-07-14 NOTE — CONSULT NOTE ADULT - ASSESSMENT
73M with CAD s/p 3 stents, HTN, T2DM, admitted for pneumonia, cardiology consulted for ST depressions and LVH noted on EKG. Vascular surgery consulted for assessment of carotids. CTA [facility] shows patent, ECAs, ICAs, no  hemodynamically significant stenosis at  ICA origins by NASCET criteria. Bilateral vertebral arteries are patent without flow limiting stenosis.    PLAN     73M with CAD s/p 3 stents, HTN, T2DM, admitted for pneumonia, cardiology consulted for ST depressions and LVH noted on EKG. Patient denies hx strokes, no headache, lightheadedness, vision changes, motor or sensory deficits. CTA shows b/l common carotids w/o stenosis, b/l IC with < 50% steenosis, vertebral arteries w/o stenosis.     PLAN  - No acute surgical intervention  - Reconsult as needed    Discussed with Dr Davila on behalf of Dr Gavin    Vascular Surgery  [phone]

## 2023-07-14 NOTE — PHYSICAL THERAPY INITIAL EVALUATION ADULT - ADDITIONAL COMMENTS
Patient is Sinhala speaking,  used for the session. Patient reports he lives with his and son and wife in a single floor house w/no steps to enter. Patient reports he is independent in ADLs w/o use of AD, denies any recent hx of fall, reports if he goes home, his family will assist w/ADLs if required.

## 2023-07-14 NOTE — CONSULT NOTE ADULT - SUBJECTIVE AND OBJECTIVE BOX
VASCULAR SURGERY CONSULT NOTE    HPI: 73M with CAD s/p 3 stents, HTN, T2DM, admitted for pneumonia, cardiology consulted for ST depressions and LVH noted on EKG.     Vascular surgery consulted for assessment of carotids.     PMH/PSH: HTN (hypertension)    DM (diabetes mellitus)    MEDS:aspirin enteric coated 81 milliGRAM(s) Oral daily  atorvastatin 80 milliGRAM(s) Oral at bedtime  clopidogrel Tablet 75 milliGRAM(s) Oral daily  dextrose 5%. 1000 milliLiter(s) (100 mL/Hr) IV Continuous <Continuous>  dextrose 5%. 1000 milliLiter(s) (50 mL/Hr) IV Continuous <Continuous>  dextrose 50% Injectable 25 Gram(s) IV Push once  dextrose 50% Injectable 25 Gram(s) IV Push once  dextrose 50% Injectable 12.5 Gram(s) IV Push once  glucagon  Injectable 1 milliGRAM(s) IntraMuscular once  heparin   Injectable 5000 Unit(s) SubCutaneous every 12 hours  insulin glargine Injectable (LANTUS) 16 Unit(s) SubCutaneous at bedtime  insulin lispro (ADMELOG) corrective regimen sliding scale   SubCutaneous at bedtime  insulin lispro (ADMELOG) corrective regimen sliding scale   SubCutaneous three times a day before meals  insulin lispro Injectable (ADMELOG) 5 Unit(s) SubCutaneous three times a day before meals  metoprolol succinate ER 50 milliGRAM(s) Oral daily  spironolactone 25 milliGRAM(s) Oral daily  valsartan 80 milliGRAM(s) Oral daily  dextrose Oral Gel 15 Gram(s) Oral once PRN Blood Glucose LESS THAN 70 milliGRAM(s)/deciliter  guaiFENesin Oral Liquid (Sugar-Free) 100 milliGRAM(s) Oral every 6 hours PRN Cough      ALLERGIES: NKDA    REVIEW OF SYSTEMS: All ROS negative except as per HPI.  ____________________________________________    VITALS:T(C): 36.6, Max: 36.7 (07-13)  T(F): 97.8, Max: 98.1 (07-13)  HR: 65 (60 - 67)  BP: 121/74 (99/63 - 127/69)  BP(mean): --  ABP: --  ABP(mean): --  RR: 18 (18 - 18)  SpO2: 96% (95% - 98%)  CVP(mm Hg): --  CVP(cm H2O): --  room air            PHYSICAL EXAM:  General: AAOx3, no acute distress.  NECK: carotid pulses palpable  Respiratory: breathing comfortably, no increased WOB   Abdomen: soft, nontender, nondistended, no rebound, no guarding  Extremities: Moves all four. All extremity pulses palpable.   ____________________________________________    LABS:     ____________________________________________    RADIOLOGY & ADDITIONAL STUDIES:   < from: MR Angio Head No Cont (07.13.23 @ 08:47) >    CLINICAL INFORMATION:    further eval mild to moderate focal stenoses   bilateral MCA, left  MMR  Admitting Dxs: I50.9 COUGH/EAR ACHE    TECHNIQUE:  MR angiography was performed using three-dimensional   time-of-flight technique.  This data set was reconstructed as maximum   intensity pixel images and displayed in multiple rotations.  CONTRAST:    None    COMPARISON:  CT head and CT angiography 7/11/2023    FINDINGS:    The ANTERIOR circulation demonstrates intact inflow from the ascending   cervical segment to the petrous segment of each internal carotid artery.   The cavernous and clinoid segments demonstrate considerable luminal   irregularity and narrowing. This may be greatest at the right proximal   clinoid segment in the range of 50-60%, likely less than 50% on the left.     The ophthalmic arteries are demonstrated as patent vessels on each   side.    The anterior cerebral arteries are asymmetric with a dominant   caliber left A1 segment, only tiny caliber on the right.  An anterior   communicating artery is present. The A2 segments are patent to peripheral   branching. The right middle cerebral artery initial M1 segment   demonstrates a severe stenosis at its distal aspect with near complete   signal loss. There are further stenoses at its bifurcation also high   grade. Its more peripheral peripheral anterior and posterior division   sylvian branches appear relatively intact.  The left middle cerebral   artery initial M1 segment appears relatively intact. At its bifurcation   particularly within its posterior division there are multifocal   high-grade stenoses. Further peripheral peripheral anterior and posterior   division sylvian branches appear relatively intact.    The POSTERIOR circulation demonstrates intact inflow from each vertebral   artery.   The vertebral arteries are near symmetric in caliber.   Intracranial segment of each vertebral artery demonstrates focal stenoses   and attenuation. On the right these likely exceed 50%. On the left there   are also focal stenoses of approximately 50%. The vessel is small caliber   distal to its PICA but patent to the basilar formation. Its most distal   segment preceding the basilar formation also demonstrates focal   high-grade stenosis. PICA arteries are patent on each side.  The basilar   artery demonstrates a severe stenosis in its middle third.  Superior   cerebellar arteries are demonstrated.  There is fetal origin of each   posterior cerebral artery from the ipsilateral internal carotid artery   (typically incidental developmental variant). Small caliber P1 segments   are present.   Each posterior cerebral artery demonstrates focal low   grade stenoses, patent to peripheral branching.    No intracranial aneurysm or arteriovenous malformation  is recognized.    Note that small intracranial aneurysms less than 0.5 cm may not be   detected by this technique.      IMPRESSION:    1. ANTERIOR CIRCULATION:   Widespread advanced intracranial   atherosclerosis involves the internal carotid arteries, moderate on the   right and mild-to-moderate on the left, and the middle cerebral arteries   severe on the right and moderate to severe on the left.    2. POSTERIOR CIRCULATION:  Intracranial atherosclerosis involves the   vertebral arteries, moderate to severe on each side and the posterior   cerebral arteries, mild-to-moderate.    < end of copied text >      < from: CT Angio Neck w/ IV Cont (07.11.23 @ 17:02) >  INTERPRETATION:  CT HEAD, CT ANGIO BRAIN, CT ANGIO NECK  HEAD CT    INDICATIONS: r/o stroke. Admitting Dxs: I50.9 COUGH/EAR ACHE MCT  73y (1950) RH man with a PMHx significant for DM, HTN, CAD s/p 3   stents who presented with cough productive of green sputum with concerns   for pneumonia, found to have ST changes on EKG with concerning   echocardiogram findings for which patient underwent left heart   catheterization on 6/29/2023. Cath demonstrated 3x CAD for which patient  was planned for complex PCI which occurred on 7/10/2023. Neurology is   consulted for LLE weakness. LKW 11 AM 7/10/2023, patient then went for   his PCI around noon 7/10/2023 and was recommended to limit lower   extremity movement given recent procedure. Patient first noted the   symptoms when attempting to use the restroom at 5 AM today 7/11/2023.   Patient noted slight weakness of his LLE when walking.    TECHNIQUE:  HEAD CT:  Serial axial images were obtained from the skull base to the vertex   without the use of intravenous contrast. RAPID artificial intelligence   was used for preliminary evaluation of intracranial hemorrhage.    COMPARISON EXAMINATION: None.    FINDINGS:    HEAD CT:  VENTRICLES AND SULCI: Ventricles and sulci are unremarkable for patient   age.  INTRA-AXIAL: No intracranial mass, acute hemorrhage, or midline shift is   present.There is non-specific decreased attenuation in the white matter   likely microvascular disease. Old lacunar infarct left basal ganglia.  EXTRA-AXIAL: No extra-axial fluid collection is present.  INTRACRANIAL HEMORRHAGE: None.    VISUALIZED SINUSES: No air-fluid levels are identified.  VISUALIZED MASTOIDS: Near complete opacification of left mastoid air   cells. The right-sided clear.  CALVARIUM:  Intact.  MISCELLANEOUS:  None.    IMPRESSION:   See below.    ===========================================================================  ===========================      CTA OF THE [place] OF SURESH AND NECK:    TECHNIQUE:  RAPID artificialintelligence was used for intracranial large vessel   occlusion.    Contrast: 70 cc Omnipaque 300 administered. 30 cc discarded.    CTA [place] OF SURESH:  After the intravenous power injection of non-ionic contrast material,   serial thin sections were obtained through the intracranial circulation   on a multislice CT scanner.  Images were reformatted using a dedicated 3D   software package and viewed on a dedicated workstation in multiple planes.    CTA NECK:  After the intravenous power injection of non-ionic contrast material,   serial thin sections were obtained through the cervical circulation on a   multislice CT scanner.  Images were reformatted using a dedicated 3D   software package and viewed on a dedicated workstation in multiple planes.    COMPARISON EXAMINATION: None.    FINDINGS:    CTA [place] OF SURESH:  ANTERIOR CIRCULATION  ICA  CAVERNOUS, SUPRACLINOID, BIFURCATION SEGMENTS: Patent without flow   limiting stenosis.    ANTERIOR CEREBRAL ARTERIES: Left A1, anterior communicating and A2   anterior cerebral arteries are unremarkable in course and caliber without   flow limiting stenosis. Hypoplastic or absent right A1 segment.    MIDDLE CEREBRAL ARTERIES: Bilateral focal moderate stenoses in the mid   right M1 segment anddistal left M1 segment of MCA. Mild to moderate   focal stenosis at the origin of M2 segments of the left MCA.    POSTERIOR CIRCULATION:  VERTEBRAL ARTERIES: Moderate focal stenoses in the V4 segment of the left   vertebral artery. The right side is dominant.  BASILAR ARTERY: Patent no flow limiting stenosis.  POSTERIOR CEREBRAL ARTERIES: Mild focal stenosis at P2 segment of left   PCA. Hypoplastic or absent bilateral P1 segments. Prominent right   posterior communicating artery or persistent fetal origin of right PCA.      CTA NECK:  GREAT VESSELS: Visualized segments are patent, no flow limiting stenosis.    COMMON CAROTID ARTERIES:  RIGHT CCA: Patent without flow limiting stenosis  LEFT CCA: Patent without flow limiting stenosis    CAROTIDBULBS:  RIGHT CB: Patent without flow limiting stenosis  LEFT CB: Patent without flow limiting stenosis    INTERNAL CAROTID ARTERIES:  RIGHT ICA: Mild calcified plaque with less than 50% stenosis at the ICA   origin by NASCET criteria.  LEFT ICA: Mild calcified plaque with less than 50% stenosis at the ICA   origin by NASCET criteria.    VERTEBRAL ARTERIES:  RIGHT VA: Patent no evidence for any flow limiting stenosis.  LEFT VA: Patent no evidence for any flow limiting stenosis.      SOFT TISSUES:Unremarkable  BONES: Unremarkable      IMPRESSIONS:    HEAD CT: Mild volume loss, microvascular disease, no acute hemorrhage or   midline shift.  Chronic lacunar infarct left basal ganglia.    CTA COW:  Multiple mild to moderate focal stenoses bilateral MCA, left   PCA and right V4 segment of vertebral artery. No LVO.    CTA NECK: Patent, ECAs, ICAs, no  hemodynamically significant stenosis at    ICA origins by NASCET criteria.  Bilateral vertebral arteries are patent without flow limiting stenosis.     Discussed with LOTUS Gomez at 5:50 PM. MRI may be helpful for further   evaluation, if clinically indicated.    < end of copied text >

## 2023-07-14 NOTE — PROGRESS NOTE ADULT - ASSESSMENT
SUBJ:  no CP  seen at noon     Home Medications:  metFORMIN 850 mg oral tablet: 1 tab(s) orally 2 times a day (26 Jun 2023 17:58)      MEDICATIONS  (STANDING):  aspirin enteric coated 81 milliGRAM(s) Oral daily  atorvastatin 80 milliGRAM(s) Oral at bedtime  clopidogrel Tablet 75 milliGRAM(s) Oral daily  dextrose 5%. 1000 milliLiter(s) (50 mL/Hr) IV Continuous <Continuous>  dextrose 5%. 1000 milliLiter(s) (100 mL/Hr) IV Continuous <Continuous>  dextrose 50% Injectable 12.5 Gram(s) IV Push once  dextrose 50% Injectable 25 Gram(s) IV Push once  dextrose 50% Injectable 25 Gram(s) IV Push once  glucagon  Injectable 1 milliGRAM(s) IntraMuscular once  heparin   Injectable 5000 Unit(s) SubCutaneous every 12 hours  insulin glargine Injectable (LANTUS) 16 Unit(s) SubCutaneous at bedtime  insulin lispro (ADMELOG) corrective regimen sliding scale   SubCutaneous at bedtime  insulin lispro (ADMELOG) corrective regimen sliding scale   SubCutaneous three times a day before meals  insulin lispro Injectable (ADMELOG) 5 Unit(s) SubCutaneous three times a day before meals  metoprolol succinate ER 50 milliGRAM(s) Oral daily  spironolactone 25 milliGRAM(s) Oral daily  valsartan 80 milliGRAM(s) Oral daily    MEDICATIONS  (PRN):  dextrose Oral Gel 15 Gram(s) Oral once PRN Blood Glucose LESS THAN 70 milliGRAM(s)/deciliter  guaiFENesin Oral Liquid (Sugar-Free) 100 milliGRAM(s) Oral every 6 hours PRN Cough      Vital Signs Last 24 Hrs  T(C): 36.9 (14 Jul 2023 21:02), Max: 36.9 (14 Jul 2023 21:02)  T(F): 98.4 (14 Jul 2023 21:02), Max: 98.4 (14 Jul 2023 21:02)  HR: 65 (14 Jul 2023 21:02) (60 - 67)  BP: 135/71 (14 Jul 2023 21:02) (99/63 - 135/71)  BP(mean): --  RR: 18 (14 Jul 2023 21:02) (18 - 18)  SpO2: 95% (14 Jul 2023 21:02) (95% - 98%)    Parameters below as of 14 Jul 2023 21:02  Patient On (Oxygen Delivery Method): room air        REVIEW OF SYSTEMS:  As per HPI, otherwise unremarkable.     PHYSICAL EXAM:  Constitutional/Appearance: Normal, Well-developed  HEENT:   Normal oral mucosa, no drainage or redness, supple neck  Lymphatic: No lymphadenopathy  Cardiovascular: Normal S1 S2, No edema, RRR  Respiratory: Lungs clear to auscultation, respirations non-labored  Psychiatry: A & O x 3, appropriate affect.   Gastrointestinal:  Soft, Non-tender, no distention  Skin: No rashes, No ecchymoses, No cyanosis	  Neurologic: Non-focal, Alert and oriented x 3  Extremities: Normal range of motion  Vascular: Peripheral pulses palpable 2+ bilaterally (radial)    tele sinus    IMPRESSION AND PLAN:  74 yo with a PMH of CAD s/p 3 stents, HTN, T2DM, admitted for pneumonia, cardiology consulted for ST depression and LVH noted on EKG.  EF 30-35%  LHC showed TVD. CP free, euvolemic.     CHF   TTE w/ EF 30-35%.   - euvolemic on exam  - d/c entresto as tto expensive to maintain , start valsartan 80mg   - Continue metop succinate 50mg daily  - start aldactone 25 mg po qd for routine CHF care.--> follow [facility]     CAD Cath 7/10 with Complex PCI with impella with Adriana Gross-->s/p PCT to p OM, PCI to mRCA  - continue Aspirin, Plavix  atorva 80.    follow up in cardiology fellows clinic  on D.c home    neuro w/o   ent w/o     d/w Renetta Burr MD  Cardiology Attending

## 2023-07-14 NOTE — PROGRESS NOTE ADULT - SUBJECTIVE AND OBJECTIVE BOX
ENT ISSUE/POD: Left ear fullness    HPI: 73M with PMH of DM, HTN, CAD s/p 3 stents. Presents with cough for the past 2 weeks. CXR with small L basilar opacity. Also noted to be in hypertensive urgency in ED. ENT was consulted for revaluation of left ear fullness. Per pt, pain started 1  week prior to admission.   Pt recently received drops prior after ENT recommendations made on 6/29, but had to stop these drops for heart procedure and per EMR received debrox ear drops from 6/29-7/1. Pt c/o continued Left ear fullness. Pt reports hard of hearing that started suddenly prior to admission.         PAST MEDICAL & SURGICAL HISTORY:  HTN (hypertension)      DM (diabetes mellitus)        Allergies    No Known Allergies    Intolerances      MEDICATIONS  (STANDING):  aspirin enteric coated 81 milliGRAM(s) Oral daily  atorvastatin 80 milliGRAM(s) Oral at bedtime  clopidogrel Tablet 75 milliGRAM(s) Oral daily  dextrose 5%. 1000 milliLiter(s) (50 mL/Hr) IV Continuous <Continuous>  dextrose 5%. 1000 milliLiter(s) (100 mL/Hr) IV Continuous <Continuous>  dextrose 50% Injectable 12.5 Gram(s) IV Push once  dextrose 50% Injectable 25 Gram(s) IV Push once  dextrose 50% Injectable 25 Gram(s) IV Push once  glucagon  Injectable 1 milliGRAM(s) IntraMuscular once  heparin   Injectable 5000 Unit(s) SubCutaneous every 12 hours  insulin glargine Injectable (LANTUS) 16 Unit(s) SubCutaneous at bedtime  insulin lispro (ADMELOG) corrective regimen sliding scale   SubCutaneous at bedtime  insulin lispro (ADMELOG) corrective regimen sliding scale   SubCutaneous three times a day before meals  insulin lispro Injectable (ADMELOG) 5 Unit(s) SubCutaneous three times a day before meals  metoprolol succinate ER 50 milliGRAM(s) Oral daily  sacubitril 49 mG/valsartan 51 mG 1 Tablet(s) Oral two times a day    MEDICATIONS  (PRN):  dextrose Oral Gel 15 Gram(s) Oral once PRN Blood Glucose LESS THAN 70 milliGRAM(s)/deciliter  guaiFENesin Oral Liquid (Sugar-Free) 100 milliGRAM(s) Oral every 6 hours PRN Cough      Social History: see consult    Family history: see consult    ROS:   ENT: all negative except as noted in HPI   Pulm: denies SOB, cough, hemoptysis  Neuro: denies numbness/tingling, loss of sensation  Endo: denies heat/cold intolerance, excessive sweating      Vital Signs Last 24 Hrs  T(C): 36.4 (14 Jul 2023 05:31), Max: 37.3 (13 Jul 2023 17:10)  T(F): 97.6 (14 Jul 2023 05:31), Max: 99.1 (13 Jul 2023 17:10)  HR: 62 (14 Jul 2023 05:31) (60 - 69)  BP: 117/62 (14 Jul 2023 05:31) (103/61 - 126/68)  BP(mean): --  RR: 18 (14 Jul 2023 05:31) (18 - 18)  SpO2: 97% (14 Jul 2023 05:31) (95% - 98%)    Parameters below as of 14 Jul 2023 05:31  Patient On (Oxygen Delivery Method): room air                  PHYSICAL EXAM:  Gen: NAD  Skin: No rashes, bruises, or lesions  Head: Normocephalic, Atraumatic  Face: no edema, erythema, or fluctuance. Parotid glands soft without mass  Eyes: no scleral injection  Ears: Right - ear canal clear, TM intact without effusion or erythema. No evidence of any fluid drainage. No mastoid tenderness, erythema, or ear bulging            Left - ear canal clear, TM intact without effusion or erythema. No evidence of any fluid drainage. No mastoid tenderness, erythema, or ear bulging  Nose: Nares bilaterally patent, no discharge  Mouth: No Stridor / Drooling / Trismus.  Mucosa moist, tongue/uvula midline, oropharynx clear  Neck: Flat, supple, no lymphadenopathy, trachea midline, no masses  Lymphatic: No lymphadenopathy  Resp: breathing easily, no stridor  Neuro: facial nerve intact, no facial droop         ENT ISSUE/POD: Left ear fullness    HPI: 73M with PMH of DM, HTN, CAD s/p 3 stents. Presents with cough for the past 2 weeks. CXR with small L basilar opacity. Also noted to be in hypertensive urgency in ED. ENT was consulted for revaluation of left ear fullness. Per pt, pain started 1  week prior to admission.   Pt recently received drops prior after ENT recommendations made on 6/29, but had to stop these drops for heart procedure and per EMR received debrox ear drops from 6/29-7/1. Pt c/o continued Left ear fullness. Pt reports hard of hearing that started suddenly prior to admission after an episode of coughing. Pt denies ear pain, vertigo, ear discharge, fevers. .         PAST MEDICAL & SURGICAL HISTORY:  HTN (hypertension)      DM (diabetes mellitus)        Allergies    No Known Allergies    Intolerances      MEDICATIONS  (STANDING):  aspirin enteric coated 81 milliGRAM(s) Oral daily  atorvastatin 80 milliGRAM(s) Oral at bedtime  clopidogrel Tablet 75 milliGRAM(s) Oral daily  dextrose 5%. 1000 milliLiter(s) (50 mL/Hr) IV Continuous <Continuous>  dextrose 5%. 1000 milliLiter(s) (100 mL/Hr) IV Continuous <Continuous>  dextrose 50% Injectable 12.5 Gram(s) IV Push once  dextrose 50% Injectable 25 Gram(s) IV Push once  dextrose 50% Injectable 25 Gram(s) IV Push once  glucagon  Injectable 1 milliGRAM(s) IntraMuscular once  heparin   Injectable 5000 Unit(s) SubCutaneous every 12 hours  insulin glargine Injectable (LANTUS) 16 Unit(s) SubCutaneous at bedtime  insulin lispro (ADMELOG) corrective regimen sliding scale   SubCutaneous at bedtime  insulin lispro (ADMELOG) corrective regimen sliding scale   SubCutaneous three times a day before meals  insulin lispro Injectable (ADMELOG) 5 Unit(s) SubCutaneous three times a day before meals  metoprolol succinate ER 50 milliGRAM(s) Oral daily  sacubitril 49 mG/valsartan 51 mG 1 Tablet(s) Oral two times a day    MEDICATIONS  (PRN):  dextrose Oral Gel 15 Gram(s) Oral once PRN Blood Glucose LESS THAN 70 milliGRAM(s)/deciliter  guaiFENesin Oral Liquid (Sugar-Free) 100 milliGRAM(s) Oral every 6 hours PRN Cough      Social History: see consult    Family history: see consult    ROS:   ENT: all negative except as noted in HPI   Pulm: denies SOB, cough, hemoptysis  Neuro: denies numbness/tingling, loss of sensation  Endo: denies heat/cold intolerance, excessive sweating      Vital Signs Last 24 Hrs  T(C): 36.4 (14 Jul 2023 05:31), Max: 37.3 (13 Jul 2023 17:10)  T(F): 97.6 (14 Jul 2023 05:31), Max: 99.1 (13 Jul 2023 17:10)  HR: 62 (14 Jul 2023 05:31) (60 - 69)  BP: 117/62 (14 Jul 2023 05:31) (103/61 - 126/68)  BP(mean): --  RR: 18 (14 Jul 2023 05:31) (18 - 18)  SpO2: 97% (14 Jul 2023 05:31) (95% - 98%)    Parameters below as of 14 Jul 2023 05:31  Patient On (Oxygen Delivery Method): room air                  PHYSICAL EXAM:  Gen: NAD  Skin: No rashes, bruises, or lesions  Head: Normocephalic, Atraumatic  Face: no edema, erythema, or fluctuance. Parotid glands soft without mass  Eyes: no scleral injection  Ears: Right - cerumen noted in ear canal and removed, TM intact without effusion or erythema. No evidence of any fluid drainage. No mastoid tenderness, erythema, or ear bulging            Left - Moderate amount of cerumen noted in EAC and removed, TM appears intact without effusion or erythema. No evidence of any fluid drainage. No mastoid tenderness, erythema, or ear bulging  Nose: Nares bilaterally patent, no discharge  Mouth: No Stridor / Drooling / Trismus.  Mucosa moist, tongue/uvula midline, oropharynx clear  Neck: Flat, supple, no lymphadenopathy, trachea midline, no masses  Lymphatic: No lymphadenopathy  Resp: breathing easily, no stridor  Neuro: facial nerve intact, no facial droop

## 2023-07-14 NOTE — CONSULT NOTE ADULT - CONSULT REQUESTED DATE/TIME
05-Jul-2023 10:03
14-Jul-2023 19:18
11-Jul-2023 11:16
26-Jun-2023 14:31
29-Jun-2023 17:54
29-Jun-2023 00:30
27-Jun-2023 08:30
28-Jun-2023 15:54

## 2023-07-14 NOTE — PROGRESS NOTE ADULT - PROBLEM SELECTOR PLAN 5
-F/u MR brain  -W/u per neuro
-MR angio head with widespread advanced intracranial atherosclerosis  -Neuro f/u

## 2023-07-14 NOTE — PROGRESS NOTE ADULT - PROBLEM SELECTOR PLAN 1
·  Problem: Hearing loss.   ·  Plan: - Recommend: audiogram  - F/U with ENT as outpt at [facility] [phone] Dr Mani Thorpe  - ENT to continue to follow. - Follow up Audiogram. Pending results may consider steroids if indicated  - ENT continue to follow    Follow up outpatient with Dr. Singleton/Paulina/Yves/Denilson [facility] [phone] or [facility] [phone].

## 2023-07-14 NOTE — PROGRESS NOTE ADULT - SUBJECTIVE AND OBJECTIVE BOX
DATE OF SERVICE: 07-14-23 @ 14:59  CHIEF COMPLAINT:Patient is a 73y old  Male who presents with a chief complaint of cp (13 Jul 2023 18:42)    	        PAST MEDICAL & SURGICAL HISTORY:  HTN (hypertension)      DM (diabetes mellitus)              NECK: No pain or stiffness  RESPIRATORY: No cough, wheezing, chills or hemoptysis;   CARDIOVASCULAR: No chest pain, palpitations, passing out,  GASTROINTESTINAL: No abdominal or epigastric pain. No nausea, vomiting, or hematemesis; No diarrhea or constipation. No melena or hematochezia.  GENITOURINARY: No dysuria, frequency, hematuria, or incontinence  NEUROLOGICAL: No headaches,   Medications:  MEDICATIONS  (STANDING):  aspirin enteric coated 81 milliGRAM(s) Oral daily  atorvastatin 80 milliGRAM(s) Oral at bedtime  clopidogrel Tablet 75 milliGRAM(s) Oral daily  dextrose 5%. 1000 milliLiter(s) (50 mL/Hr) IV Continuous <Continuous>  dextrose 5%. 1000 milliLiter(s) (100 mL/Hr) IV Continuous <Continuous>  dextrose 50% Injectable 12.5 Gram(s) IV Push once  dextrose 50% Injectable 25 Gram(s) IV Push once  dextrose 50% Injectable 25 Gram(s) IV Push once  glucagon  Injectable 1 milliGRAM(s) IntraMuscular once  heparin   Injectable 5000 Unit(s) SubCutaneous every 12 hours  insulin glargine Injectable (LANTUS) 16 Unit(s) SubCutaneous at bedtime  insulin lispro (ADMELOG) corrective regimen sliding scale   SubCutaneous at bedtime  insulin lispro (ADMELOG) corrective regimen sliding scale   SubCutaneous three times a day before meals  insulin lispro Injectable (ADMELOG) 5 Unit(s) SubCutaneous three times a day before meals  metoprolol succinate ER 50 milliGRAM(s) Oral daily  sacubitril 49 mG/valsartan 51 mG 1 Tablet(s) Oral two times a day  spironolactone 25 milliGRAM(s) Oral daily    MEDICATIONS  (PRN):  dextrose Oral Gel 15 Gram(s) Oral once PRN Blood Glucose LESS THAN 70 milliGRAM(s)/deciliter  guaiFENesin Oral Liquid (Sugar-Free) 100 milliGRAM(s) Oral every 6 hours PRN Cough    	    PHYSICAL EXAM:  T(C): 36.6 (07-14-23 @ 13:46), Max: 37.3 (07-13-23 @ 17:10)  HR: 60 (07-14-23 @ 13:46) (60 - 69)  BP: 127/69 (07-14-23 @ 13:46) (99/63 - 127/69)  RR: 18 (07-14-23 @ 13:46) (18 - 18)  SpO2: 97% (07-14-23 @ 13:46) (95% - 98%)  Wt(kg): --  I&O's Summary    13 Jul 2023 07:01  -  14 Jul 2023 07:00  --------------------------------------------------------  IN: 840 mL / OUT: 0 mL / NET: 840 mL    14 Jul 2023 07:01  -  14 Jul 2023 14:59  --------------------------------------------------------  IN: 600 mL / OUT: 0 mL / NET: 600 mL        Appearance: Normal	  HEENT:   Normal oral mucosa, PERRL, EOMI	  Lymphatic: No lymphadenopathy  Cardiovascular: Normal S1 S2, No JVD, No murmurs, No edema  Respiratory: Lungs clear to auscultation	  Psychiatry: A & O x 3, Mood & affect appropriate  Gastrointestinal:  Soft, Non-tender, + BS	  Skin: No rashes, No ecchymoses, No cyanosis	  Neurologic: No new changes  TELEMETRY: 	    ECG:  	  RADIOLOGY:  OTHER: 	  	  LABS:	 	    CARDIAC MARKERS:                  proBNP:   Lipid Profile:   HgA1c:   TSH:

## 2023-07-15 LAB
GLUCOSE BLDC GLUCOMTR-MCNC: 101 MG/DL — HIGH (ref 70–99)
GLUCOSE BLDC GLUCOMTR-MCNC: 131 MG/DL — HIGH (ref 70–99)
GLUCOSE BLDC GLUCOMTR-MCNC: 137 MG/DL — HIGH (ref 70–99)
GLUCOSE BLDC GLUCOMTR-MCNC: 189 MG/DL — HIGH (ref 70–99)

## 2023-07-15 RX ADMIN — Medication 5 UNIT(S): at 17:28

## 2023-07-15 RX ADMIN — VALSARTAN 80 MILLIGRAM(S): 80 TABLET ORAL at 10:36

## 2023-07-15 RX ADMIN — HEPARIN SODIUM 5000 UNIT(S): 5000 INJECTION INTRAVENOUS; SUBCUTANEOUS at 06:59

## 2023-07-15 RX ADMIN — Medication 50 MILLIGRAM(S): at 06:58

## 2023-07-15 RX ADMIN — Medication 5 UNIT(S): at 08:57

## 2023-07-15 RX ADMIN — Medication 81 MILLIGRAM(S): at 11:34

## 2023-07-15 RX ADMIN — CLOPIDOGREL BISULFATE 75 MILLIGRAM(S): 75 TABLET, FILM COATED ORAL at 11:34

## 2023-07-15 RX ADMIN — HEPARIN SODIUM 5000 UNIT(S): 5000 INJECTION INTRAVENOUS; SUBCUTANEOUS at 17:28

## 2023-07-15 RX ADMIN — ATORVASTATIN CALCIUM 80 MILLIGRAM(S): 80 TABLET, FILM COATED ORAL at 21:25

## 2023-07-15 RX ADMIN — Medication 1: at 08:57

## 2023-07-15 RX ADMIN — Medication 5 UNIT(S): at 13:04

## 2023-07-15 RX ADMIN — SPIRONOLACTONE 25 MILLIGRAM(S): 25 TABLET, FILM COATED ORAL at 06:58

## 2023-07-15 RX ADMIN — INSULIN GLARGINE 16 UNIT(S): 100 INJECTION, SOLUTION SUBCUTANEOUS at 22:09

## 2023-07-15 NOTE — CHART NOTE - NSCHARTNOTESELECT_GEN_ALL_CORE
Endocrinology/Event Note
Event Note
Event Note
Neurology/Event Note
Post Cath Check/Event Note
Stroke update
perlcose note/Event Note

## 2023-07-15 NOTE — CHART NOTE - NSCHARTNOTEFT_GEN_A_CORE
Primary team called at 1500, notified that MRI was done. Primary team is arranging discharge   MRI was personally reviewed and agreed with the finding of "several scattered subcentimeter acute infarcts in the bilateral frontal and parietal lobes and right cerebellum, in multiple vascular territories, likely embolic", with multiple chronic lacunar infarcts in the bilateral centrum semiovale and bilateral corona radiata and bilateral cerebelli.     Pt TTE EF 37% now on DAPT s/p PCI stenting  LLE symptoms resolved  Recommendation:  -[] Please contact cardiac for possible anticoagulation in the setting of bilateral hemisphere chronic, acute and subacute embolic appearing infarct prior to DC.  -[] Please consider placing Loop or cardiac event monitor for Afib screening and f/u with neurology outpt   -please treat hyperlipidemia to LDL goal <70 for secondary stroke prevention    Above recommendations were staffed with Dr Manzano attending     Troy Boo MD PHD  Vascular neurology fellow PGY5 Primary team called at 1500, notified that MRI was done. Primary team is arranging discharge   MRI was personally reviewed and agreed with the finding of "several scattered subcentimeter acute infarcts in the bilateral frontal and parietal lobes and right cerebellum, in multiple vascular territories, likely embolic", with multiple chronic lacunar infarcts in the bilateral centrum semiovale and bilateral corona radiata and bilateral cerebelli.     Pt TTE EF 37% now on DAPT s/p PCI stenting  LLE symptoms resolved  Recommendation:  -[] Please contact cardiac for possible anticoagulation in the setting of bilateral hemisphere chronic, acute and subacute embolic appearing infarct and low EF, prior to DC.  -[] Please consider placing Loop or cardiac event monitor for Afib screening and f/u with neurology outpt, prior to DC.  -Please treat hyperlipidemia to LDL goal <70 for secondary stroke prevention    Above recommendations were staffed with Dr Manzano attending     Troy Boo MD PHD  Vascular neurology fellow PGY5

## 2023-07-15 NOTE — PROGRESS NOTE ADULT - ASSESSMENT
73M with CAD s/p 3 stents, HTN, T2DM, admitted for pneumonia, cardiology consulted for ST depressions and LVH noted on EKG.      ST Depressions  Hypertensive urgency resolved    -F/u  TTE noted  cath noted  triple vessel   s/p cath w/ pci  cards f/u noted    llext weakness  cts noted  mri done   f/u results noted  mult infarct   c/a was/ plavix    neuro f/u for further recs and d/c clearance  vasc eval noted for mri results    c/w meds    likely pna  bronchitis  s/p abs  nebs  pulm eval  noted    dm  fsg riss  dvt proph      d/c when cleared by neuro

## 2023-07-15 NOTE — PROGRESS NOTE ADULT - SUBJECTIVE AND OBJECTIVE BOX
DATE OF SERVICE: 07-15-23 @ 12:28  CHIEF COMPLAINT:Patient is a 73y old  Male who presents with a chief complaint of nstemi (14 Jul 2023 22:42)    	        PAST MEDICAL & SURGICAL HISTORY:  HTN (hypertension)      DM (diabetes mellitus)              REVIEW OF SYSTEMS:  no new complaints  Medications:  MEDICATIONS  (STANDING):  aspirin enteric coated 81 milliGRAM(s) Oral daily  atorvastatin 80 milliGRAM(s) Oral at bedtime  clopidogrel Tablet 75 milliGRAM(s) Oral daily  dextrose 5%. 1000 milliLiter(s) (50 mL/Hr) IV Continuous <Continuous>  dextrose 5%. 1000 milliLiter(s) (100 mL/Hr) IV Continuous <Continuous>  dextrose 50% Injectable 12.5 Gram(s) IV Push once  dextrose 50% Injectable 25 Gram(s) IV Push once  dextrose 50% Injectable 25 Gram(s) IV Push once  glucagon  Injectable 1 milliGRAM(s) IntraMuscular once  heparin   Injectable 5000 Unit(s) SubCutaneous every 12 hours  insulin glargine Injectable (LANTUS) 16 Unit(s) SubCutaneous at bedtime  insulin lispro (ADMELOG) corrective regimen sliding scale   SubCutaneous at bedtime  insulin lispro (ADMELOG) corrective regimen sliding scale   SubCutaneous three times a day before meals  insulin lispro Injectable (ADMELOG) 5 Unit(s) SubCutaneous three times a day before meals  metoprolol succinate ER 50 milliGRAM(s) Oral daily  spironolactone 25 milliGRAM(s) Oral daily  valsartan 80 milliGRAM(s) Oral daily    MEDICATIONS  (PRN):  dextrose Oral Gel 15 Gram(s) Oral once PRN Blood Glucose LESS THAN 70 milliGRAM(s)/deciliter  guaiFENesin Oral Liquid (Sugar-Free) 100 milliGRAM(s) Oral every 6 hours PRN Cough    	    PHYSICAL EXAM:  T(C): 36.5 (07-15-23 @ 10:08), Max: 36.9 (07-14-23 @ 21:02)  HR: 66 (07-15-23 @ 10:08) (60 - 72)  BP: 131/73 (07-15-23 @ 10:08) (120/71 - 135/71)  RR: 18 (07-15-23 @ 10:08) (18 - 18)  SpO2: 99% (07-15-23 @ 10:08) (95% - 99%)  Wt(kg): --  I&O's Summary    14 Jul 2023 07:01  -  15 Jul 2023 07:00  --------------------------------------------------------  IN: 840 mL / OUT: 500 mL / NET: 340 mL    15 Jul 2023 07:01  -  15 Jul 2023 12:28  --------------------------------------------------------  IN: 240 mL / OUT: 0 mL / NET: 240 mL          Cardiovascular: Normal S1 S2, No JVD, No murmurs, No edema  Respiratory: Lungs clear to auscultation	    Gastrointestinal:  Soft, Non-tender, + BS	  neuro no new changes    TELEMETRY: 	    ECG:  	  RADIOLOGY:  OTHER: 	  	  LABS:	 	    CARDIAC MARKERS:                  proBNP:   Lipid Profile:   HgA1c:   TSH:

## 2023-07-16 LAB
ALBUMIN SERPL ELPH-MCNC: 3.7 G/DL — SIGNIFICANT CHANGE UP (ref 3.3–5)
ALP SERPL-CCNC: 120 U/L — SIGNIFICANT CHANGE UP (ref 40–120)
ALT FLD-CCNC: 31 U/L — SIGNIFICANT CHANGE UP (ref 10–45)
ANION GAP SERPL CALC-SCNC: 13 MMOL/L — SIGNIFICANT CHANGE UP (ref 5–17)
AST SERPL-CCNC: 24 U/L — SIGNIFICANT CHANGE UP (ref 10–40)
BILIRUB SERPL-MCNC: 0.3 MG/DL — SIGNIFICANT CHANGE UP (ref 0.2–1.2)
BUN SERPL-MCNC: 23 MG/DL — SIGNIFICANT CHANGE UP (ref 7–23)
CALCIUM SERPL-MCNC: 9.8 MG/DL — SIGNIFICANT CHANGE UP (ref 8.4–10.5)
CHLORIDE SERPL-SCNC: 104 MMOL/L — SIGNIFICANT CHANGE UP (ref 96–108)
CO2 SERPL-SCNC: 23 MMOL/L — SIGNIFICANT CHANGE UP (ref 22–31)
CREAT SERPL-MCNC: 0.88 MG/DL — SIGNIFICANT CHANGE UP (ref 0.5–1.3)
EGFR: 90 ML/MIN/1.73M2 — SIGNIFICANT CHANGE UP
GLUCOSE BLDC GLUCOMTR-MCNC: 153 MG/DL — HIGH (ref 70–99)
GLUCOSE BLDC GLUCOMTR-MCNC: 163 MG/DL — HIGH (ref 70–99)
GLUCOSE BLDC GLUCOMTR-MCNC: 165 MG/DL — HIGH (ref 70–99)
GLUCOSE BLDC GLUCOMTR-MCNC: 95 MG/DL — SIGNIFICANT CHANGE UP (ref 70–99)
GLUCOSE SERPL-MCNC: 104 MG/DL — HIGH (ref 70–99)
HCT VFR BLD CALC: 39.8 % — SIGNIFICANT CHANGE UP (ref 39–50)
HGB BLD-MCNC: 13.1 G/DL — SIGNIFICANT CHANGE UP (ref 13–17)
MCHC RBC-ENTMCNC: 30.4 PG — SIGNIFICANT CHANGE UP (ref 27–34)
MCHC RBC-ENTMCNC: 32.9 GM/DL — SIGNIFICANT CHANGE UP (ref 32–36)
MCV RBC AUTO: 92.3 FL — SIGNIFICANT CHANGE UP (ref 80–100)
NRBC # BLD: 0 /100 WBCS — SIGNIFICANT CHANGE UP (ref 0–0)
PLATELET # BLD AUTO: 205 K/UL — SIGNIFICANT CHANGE UP (ref 150–400)
POTASSIUM SERPL-MCNC: 5.2 MMOL/L — SIGNIFICANT CHANGE UP (ref 3.5–5.3)
POTASSIUM SERPL-SCNC: 5.2 MMOL/L — SIGNIFICANT CHANGE UP (ref 3.5–5.3)
PROT SERPL-MCNC: 6.8 G/DL — SIGNIFICANT CHANGE UP (ref 6–8.3)
RBC # BLD: 4.31 M/UL — SIGNIFICANT CHANGE UP (ref 4.2–5.8)
RBC # FLD: 13.4 % — SIGNIFICANT CHANGE UP (ref 10.3–14.5)
SODIUM SERPL-SCNC: 140 MMOL/L — SIGNIFICANT CHANGE UP (ref 135–145)
WBC # BLD: 6.47 K/UL — SIGNIFICANT CHANGE UP (ref 3.8–10.5)
WBC # FLD AUTO: 6.47 K/UL — SIGNIFICANT CHANGE UP (ref 3.8–10.5)

## 2023-07-16 PROCEDURE — 99233 SBSQ HOSP IP/OBS HIGH 50: CPT

## 2023-07-16 RX ORDER — INSULIN GLARGINE 100 [IU]/ML
8 INJECTION, SOLUTION SUBCUTANEOUS ONCE
Refills: 0 | Status: COMPLETED | OUTPATIENT
Start: 2023-07-16 | End: 2023-07-16

## 2023-07-16 RX ADMIN — Medication 5 UNIT(S): at 17:39

## 2023-07-16 RX ADMIN — Medication 1: at 12:35

## 2023-07-16 RX ADMIN — Medication 1: at 17:40

## 2023-07-16 RX ADMIN — INSULIN GLARGINE 8 UNIT(S): 100 INJECTION, SOLUTION SUBCUTANEOUS at 22:15

## 2023-07-16 RX ADMIN — Medication 50 MILLIGRAM(S): at 05:49

## 2023-07-16 RX ADMIN — ATORVASTATIN CALCIUM 80 MILLIGRAM(S): 80 TABLET, FILM COATED ORAL at 22:16

## 2023-07-16 RX ADMIN — HEPARIN SODIUM 5000 UNIT(S): 5000 INJECTION INTRAVENOUS; SUBCUTANEOUS at 05:49

## 2023-07-16 RX ADMIN — SPIRONOLACTONE 25 MILLIGRAM(S): 25 TABLET, FILM COATED ORAL at 05:49

## 2023-07-16 RX ADMIN — Medication 5 UNIT(S): at 09:24

## 2023-07-16 RX ADMIN — Medication 1: at 09:25

## 2023-07-16 RX ADMIN — HEPARIN SODIUM 5000 UNIT(S): 5000 INJECTION INTRAVENOUS; SUBCUTANEOUS at 17:39

## 2023-07-16 RX ADMIN — Medication 5 UNIT(S): at 12:34

## 2023-07-16 RX ADMIN — Medication 81 MILLIGRAM(S): at 09:25

## 2023-07-16 RX ADMIN — VALSARTAN 80 MILLIGRAM(S): 80 TABLET ORAL at 05:49

## 2023-07-16 RX ADMIN — CLOPIDOGREL BISULFATE 75 MILLIGRAM(S): 75 TABLET, FILM COATED ORAL at 09:25

## 2023-07-16 NOTE — PROGRESS NOTE ADULT - SUBJECTIVE AND OBJECTIVE BOX
DATE OF SERVICE: 07-16-23 @ 13:36  CHIEF COMPLAINT:Patient is a 73y old  Male who presents with a chief complaint of nstemi (14 Jul 2023 22:42)    	        PAST MEDICAL & SURGICAL HISTORY:  HTN (hypertension)      DM (diabetes mellitus)              no new complaints     Medications:  MEDICATIONS  (STANDING):  aspirin enteric coated 81 milliGRAM(s) Oral daily  atorvastatin 80 milliGRAM(s) Oral at bedtime  clopidogrel Tablet 75 milliGRAM(s) Oral daily  dextrose 5%. 1000 milliLiter(s) (100 mL/Hr) IV Continuous <Continuous>  dextrose 5%. 1000 milliLiter(s) (50 mL/Hr) IV Continuous <Continuous>  dextrose 50% Injectable 12.5 Gram(s) IV Push once  dextrose 50% Injectable 25 Gram(s) IV Push once  dextrose 50% Injectable 25 Gram(s) IV Push once  glucagon  Injectable 1 milliGRAM(s) IntraMuscular once  heparin   Injectable 5000 Unit(s) SubCutaneous every 12 hours  insulin glargine Injectable (LANTUS) 16 Unit(s) SubCutaneous at bedtime  insulin lispro (ADMELOG) corrective regimen sliding scale   SubCutaneous at bedtime  insulin lispro (ADMELOG) corrective regimen sliding scale   SubCutaneous three times a day before meals  insulin lispro Injectable (ADMELOG) 5 Unit(s) SubCutaneous three times a day before meals  metoprolol succinate ER 50 milliGRAM(s) Oral daily  spironolactone 25 milliGRAM(s) Oral daily  valsartan 80 milliGRAM(s) Oral daily    MEDICATIONS  (PRN):  dextrose Oral Gel 15 Gram(s) Oral once PRN Blood Glucose LESS THAN 70 milliGRAM(s)/deciliter  guaiFENesin Oral Liquid (Sugar-Free) 100 milliGRAM(s) Oral every 6 hours PRN Cough    	    PHYSICAL EXAM:  T(C): 37.1 (07-16-23 @ 09:23), Max: 37.1 (07-16-23 @ 09:23)  HR: 66 (07-16-23 @ 09:23) (60 - 66)  BP: 131/78 (07-16-23 @ 09:23) (109/64 - 134/71)  RR: 18 (07-16-23 @ 09:23) (18 - 18)  SpO2: 97% (07-16-23 @ 09:23) (97% - 98%)  Wt(kg): --  I&O's Summary    15 Jul 2023 07:01  -  16 Jul 2023 07:00  --------------------------------------------------------  IN: 840 mL / OUT: 300 mL / NET: 540 mL    16 Jul 2023 07:01  -  16 Jul 2023 13:36  --------------------------------------------------------  IN: 300 mL / OUT: 0 mL / NET: 300 mL        y  Cardiovascular: Normal S1 S2, No JVD  Respiratory: Lungs clear to auscultation	  Psychiatry: A & O   Gastrointestinal:  Soft, Non-tender, + BS	  Skin: No rashes, No ecchymoses, No cyanosis	  Neurologic: No new changes      TELEMETRY: 	    ECG:  	  RADIOLOGY:  OTHER: 	  	  LABS:	 	    CARDIAC MARKERS:                  proBNP:   Lipid Profile:   HgA1c:   TSH:

## 2023-07-16 NOTE — PROGRESS NOTE ADULT - SUBJECTIVE AND OBJECTIVE BOX
Subjective:  Feels well, denies new complaints; wife at bedside.  Still with left leg weakness.     Telemetry: Sinus rhythm with PVCs.    Review Of Systems: No chest pain, shortness of breath, or palpitations  CONSTITUTIONAL: no fevers or chills  EYES/ENT: No visual changes;  No vertigo or throat pain   NECK: No pain or stiffness  RESPIRATORY: No cough, wheezing. No shortness of breath  CARDIOVASCULAR: No chest pain or palpitations  GASTROINTESTINAL: No abdominal or epigastric pain. No nausea, vomiting. No diarrhea. No melena.  GENITOURINARY: No dysuria, frequency or hematuria  NEUROLOGICAL: Left leg weakness  SKIN: Arm ecchymoses noted.  All other review of systems is negative unless indicated above.     Current Meds:  aspirin enteric coated 81 milliGRAM(s) Oral daily  atorvastatin 80 milliGRAM(s) Oral at bedtime  clopidogrel Tablet 75 milliGRAM(s) Oral daily  dextrose 5%. 1000 milliLiter(s) IV Continuous <Continuous>  dextrose 5%. 1000 milliLiter(s) IV Continuous <Continuous>  dextrose 50% Injectable 25 Gram(s) IV Push once  dextrose 50% Injectable 25 Gram(s) IV Push once  dextrose 50% Injectable 12.5 Gram(s) IV Push once  dextrose Oral Gel 15 Gram(s) Oral once PRN  glucagon  Injectable 1 milliGRAM(s) IntraMuscular once  guaiFENesin Oral Liquid (Sugar-Free) 100 milliGRAM(s) Oral every 6 hours PRN  heparin   Injectable 5000 Unit(s) SubCutaneous every 12 hours  insulin glargine Injectable (LANTUS) 16 Unit(s) SubCutaneous at bedtime  insulin lispro (ADMELOG) corrective regimen sliding scale   SubCutaneous at bedtime  insulin lispro (ADMELOG) corrective regimen sliding scale   SubCutaneous three times a day before meals  insulin lispro Injectable (ADMELOG) 5 Unit(s) SubCutaneous three times a day before meals  metoprolol succinate ER 50 milliGRAM(s) Oral daily  spironolactone 25 milliGRAM(s) Oral daily  valsartan 80 milliGRAM(s) Oral daily      Vitals:  T(F): 97.8 (07-16), Max: 98.7 (07-16)  HR: 60 (07-16) (60 - 66)  BP: 120/69 (07-16) (109/64 - 134/71)  RR: 18 (07-16)  SpO2: 96% (07-16)  I&O's Summary    15 Jul 2023 07:01  -  16 Jul 2023 07:00  --------------------------------------------------------  IN: 840 mL / OUT: 300 mL / NET: 540 mL    16 Jul 2023 07:01  -  16 Jul 2023 15:12  --------------------------------------------------------  IN: 540 mL / OUT: 0 mL / NET: 540 mL      Physical Exam:  Appearance: No acute distress; well appearing  Eyes: PERRL, EOMI, pink conjunctiva  HEENT: Normal oral mucosa  Cardiovascular: RRR, S1, S2, soft SM at LSB.    Respiratory: Clear to auscultation bilaterally  Gastrointestinal: soft, non-tender, non-distended with normal bowel sounds  Extremities: No edema  Musculoskeletal: No clubbing; no joint deformity   Neurologic: Left leg weakness.  Psychiatry: AAOx3, mood & affect appropriate  Skin: + arm ecchymoses.                           13.1   6.47  )-----------( 205      ( 16 Jul 2023 14:38 )             39.8     07-16    140  |  104  |  23  ----------------------------<  104<H>  5.2   |  23  |  0.88    Ca    9.8      16 Jul 2023 14:38    TPro  6.8  /  Alb  3.7  /  TBili  0.3  /  DBili  x   /  AST  24  /  ALT  31  /  AlkPhos  120  07-16      Echo (7/8/2023):  CONCLUSIONS:  1. The left ventricular systolic function is moderately decreased with an ejection fraction of 37 % by  Caldwell's method of disks. There are regional wall motion abnormalities No evidence of a thrombus in  the left ventricle.  2. Multiple segmental abnormalities exist. See findings.  3. Normal right ventricular cavity size, normal right ventricular wall thickness and normal right ventricular  systolic function.  4. Limited TTE to evaluate left ventricular systolic function.  5. Compared to the transthoracic echocardiogram performed on 6/27/2023 findings are similar on today's  study. Estimated LVEF was 30-35% on the prior study.    Cath 7/10/2023:  LM   Left main artery: Angiography shows no disease.    CX   First obtuse marginal: Angiography shows severe atherosclerosis. There  is a 90 % stenosis.  RCA   Mid right coronary artery: Angiography shows severe atherosclerosis.  There is a 99 % stenosis. First right posterolateral:  Angiographyshows complete occlusion. There is a 100 % stenosis.      Interventional Findings:   Proximal right coronary artery: This was a 99 % [place] stenosis.  Guidewire crossing was successful.    Cath 6/29/2023:  LM   Distal left main: Angiography shows mild atherosclerosis. There is a  40 % stenosis.  LAD   Proximal left anterior descending: The segment is severely calcified.  There is a 100 % total occlusion stenosis. CONNOR Flow 0.    The previously placed stent is a drug-eluting stent and is occluded.  First diagonal: There is a 100 % stenosis. The previously  placed stent is a drug-eluting stent and is occluded.    CX   Circumflex: Angiography shows mild atherosclerosis. Supplies  collaterals to RPL. First obtuse marginal: There is an 80 %  stenosis--PCI.    RCA   Distal right coronary artery: Large RVmarginal at occlusion supplies  a large LAD septal faintly . There is a 100 % stenosis.    MRI Head (7/14/2023):  IMPRESSION: Several scattered subcentimeter acute infarcts in the   bilateral frontal and parietal lobes and right cerebellum, in multiple   vascular territories, likely embolic.There are multiple chronic lacunar   infarcts in the bilateral centrum semiovale and bilateral corona radiata   and bilateral cerebelli. Chronic lacunar infarcts as above. Moderate   periventricular and subcortical white matter chronic microvascular   ischemic changes.          All Cardiology service information can be found 24/7 on [email or web address], password: [email or web address]

## 2023-07-16 NOTE — PROGRESS NOTE ADULT - ASSESSMENT
73M with CAD s/p 3 stents, HTN, T2DM, admitted for pneumonia, cardiology consulted for ST depressions and LVH noted on EKG.      ST Depressions  Hypertensive urgency resolved    -F/u  TTE noted  cath noted  triple vessel   s/p cath w/ pci  cards to f/u on rec ? a/c needed +- loop recorder in lieu ofri findings    llext weakness  cts noted  mri done   f/u results noted  mult infarct   c/w asa / plavix    neuro f/u for further recs and d/c clearance  vasc eval noted for mri results    c/w meds    likely pna  bronchitis  s/p abs  nebs  pulm eval  noted    dm  fsg riss  dvt proph      d/c when cleared by neuro

## 2023-07-16 NOTE — PROGRESS NOTE ADULT - ATTENDING COMMENTS
72 y/o man with CAD s/p PCI in the past, multiple cardiovascular risk factors including HTN, DM, initially admitted with PNA with abnormal ECG found to have severe multivessel CAD now s/p impella-assisted PCI.  --From a cardiac perspective, patient feels great, denies any significant complaints.  --Continue dual anti-platelet therapy (he is on ASA and clopidogrel).  --Continue high-intensity statin therapy.  --Optimizing GDMT for markedly reduced LV ejection fraction--he is on torpol XL, valsartan, spironolactone and appears relatively euvolemic.  --Telemetry monitoring here reveals sinus rhythm and no reported episodes of atrial fibrillation on review of prior telemetry monitoring.  --Would arrange long-term cardiac monitor with EP service to rule out AF prior to discharge given MRI findings.    --Defer to Neurology re: medical management of both chronic and acute embolic infarcts noted on MRI--Neuro follow-up recommended, particularly given leg weakness.  --For now he remains on DAPT.  --Monitor on telemetry.  --Replete lytes.  --Strict Is and Os and daily weights.
Agree with plan as outlined above.  showing TVD. Ongoing planning for complex PCI.  ?viability d/w dr suarez                                                                      Forty-1 Minutes Spent in Patient Management
73 year old man with coronary stents, admitted for pneumonia, has LVH and EKG consistent with LVH and repolarization abnormality and there are no findings to suggest acute coronary syndrome. However POCUS suggest LV dysfunction with regional hypokinesis. Cardiac echo Doppler confirms severe dysfunction with segmental hypokinesis indicative of ischemic heart disease. To have coronary angiography today.    To contact call Cardiology Fellow or Attending as listed on [email or web address] password: [email or web address].
73 year old man with coronary stents, admitted for pneumonia, has LVH and EKG consistent with LVH and repolarization abnormality and there are no findings to suggest acute coronary syndrome. However POCUS suggest LV dysfunction with regional hypokinesis. Cardiac echo Doppler confirms severe dysfunction with segmental hypokinesis indicative of ischemic heart disease. Coronary angiography with severe multivessel disease, total LAD occlusion of visualized collateral. Evaluated by CT Surgery and as discussed with them, surgical revascularization not ideal if no LAD target. Thus consideration for percutaneous interventions on RCA and Cx-OM.    To contact call Cardiology Fellow or Attending as listed on [email or web address] password: [email or web address].
73 year old man with coronary stents, admitted for pneumonia, has LVH and EKG consistent with LVH and repolarization abnormality and there are no findings to suggest acute coronary syndrome. However POCUS suggest LV dysfunction with regional hypokinesis. Thus need cardiac echo Doppler.    To contact call Cardiology Fellow or Attending as listed on [email or web address] password: [email or web address].
73 year old man with coronary stents, admitted for pneumonia, has LVH and EKG consistent with LVH and repolarization abnormality and there are no findings to suggest acute coronary syndrome. However POCUS suggest LV dysfunction with regional hypokinesis. Cardiac echo Doppler confirms severe dysfunction with segmental hypokinesis indicative of ischemic heart disease. To have coronary angiography today.    To contact call Cardiology Fellow or Attending as listed on [email or web address] password: [email or web address].

## 2023-07-16 NOTE — PROGRESS NOTE ADULT - ASSESSMENT
74 y/o man with CAD s/p PCI in the past, multiple cardiovascular risk factors including HTN, DM, initially admitted with PNA with abnormal ECG found to have severe multivessel CAD now s/p impella-assisted PCI.  --From a cardiac perspective, patient feels great, denies any significant complaints.  --Continue dual anti-platelet therapy (he is on ASA and clopidogrel).  --Continue high-intensity statin therapy.  --Optimizing GDMT for markedly reduced LV ejection fraction--he is on torpol XL, valsartan, spironolactone and appears relatively euvolemic.  --Telemetry monitoring here reveals sinus rhythm and no reported episodes of atrial fibrillation on review of prior telemetry monitoring.  --Would arrange long-term cardiac monitor with EP service to rule out AF prior to discharge given MRI findings.    --Defer to Neurology re: medical management of both chronic and acute embolic infarcts noted on MRI--Neuro follow-up recommended, particularly given leg weakness.  --For now he remains on DAPT.  --Monitor on telemetry.  --Replete lytes.  --Strict Is and Os and daily weights.

## 2023-07-17 ENCOUNTER — TRANSCRIPTION ENCOUNTER (OUTPATIENT)
Age: 73
End: 2023-07-17

## 2023-07-17 VITALS
OXYGEN SATURATION: 97 % | DIASTOLIC BLOOD PRESSURE: 76 MMHG | TEMPERATURE: 98 F | HEART RATE: 58 BPM | SYSTOLIC BLOOD PRESSURE: 157 MMHG | RESPIRATION RATE: 18 BRPM

## 2023-07-17 PROBLEM — I10 ESSENTIAL (PRIMARY) HYPERTENSION: Chronic | Status: ACTIVE | Noted: 2023-06-26

## 2023-07-17 PROBLEM — E11.9 TYPE 2 DIABETES MELLITUS WITHOUT COMPLICATIONS: Chronic | Status: ACTIVE | Noted: 2023-06-26

## 2023-07-17 LAB
GLUCOSE BLDC GLUCOMTR-MCNC: 131 MG/DL — HIGH (ref 70–99)
GLUCOSE BLDC GLUCOMTR-MCNC: 171 MG/DL — HIGH (ref 70–99)

## 2023-07-17 PROCEDURE — 80061 LIPID PANEL: CPT

## 2023-07-17 PROCEDURE — 82947 ASSAY GLUCOSE BLOOD QUANT: CPT

## 2023-07-17 PROCEDURE — 99285 EMERGENCY DEPT VISIT HI MDM: CPT

## 2023-07-17 PROCEDURE — 82435 ASSAY OF BLOOD CHLORIDE: CPT

## 2023-07-17 PROCEDURE — 84132 ASSAY OF SERUM POTASSIUM: CPT

## 2023-07-17 PROCEDURE — 75561 CARDIAC MRI FOR MORPH W/DYE: CPT

## 2023-07-17 PROCEDURE — C1760: CPT

## 2023-07-17 PROCEDURE — C1889: CPT

## 2023-07-17 PROCEDURE — 82962 GLUCOSE BLOOD TEST: CPT

## 2023-07-17 PROCEDURE — 70498 CT ANGIOGRAPHY NECK: CPT

## 2023-07-17 PROCEDURE — C1887: CPT

## 2023-07-17 PROCEDURE — 80048 BASIC METABOLIC PNL TOTAL CA: CPT

## 2023-07-17 PROCEDURE — 93308 TTE F-UP OR LMTD: CPT

## 2023-07-17 PROCEDURE — 70496 CT ANGIOGRAPHY HEAD: CPT

## 2023-07-17 PROCEDURE — 84443 ASSAY THYROID STIM HORMONE: CPT

## 2023-07-17 PROCEDURE — 93005 ELECTROCARDIOGRAM TRACING: CPT

## 2023-07-17 PROCEDURE — 80053 COMPREHEN METABOLIC PANEL: CPT

## 2023-07-17 PROCEDURE — 70450 CT HEAD/BRAIN W/O DYE: CPT

## 2023-07-17 PROCEDURE — 86901 BLOOD TYPING SEROLOGIC RH(D): CPT

## 2023-07-17 PROCEDURE — 85014 HEMATOCRIT: CPT

## 2023-07-17 PROCEDURE — 93880 EXTRACRANIAL BILAT STUDY: CPT

## 2023-07-17 PROCEDURE — 87641 MR-STAPH DNA AMP PROBE: CPT

## 2023-07-17 PROCEDURE — 85018 HEMOGLOBIN: CPT

## 2023-07-17 PROCEDURE — 83605 ASSAY OF LACTIC ACID: CPT

## 2023-07-17 PROCEDURE — 0225U NFCT DS DNA&RNA 21 SARSCOV2: CPT

## 2023-07-17 PROCEDURE — 36415 COLL VENOUS BLD VENIPUNCTURE: CPT

## 2023-07-17 PROCEDURE — 81001 URINALYSIS AUTO W/SCOPE: CPT

## 2023-07-17 PROCEDURE — 99233 SBSQ HOSP IP/OBS HIGH 50: CPT

## 2023-07-17 PROCEDURE — 70551 MRI BRAIN STEM W/O DYE: CPT

## 2023-07-17 PROCEDURE — 85610 PROTHROMBIN TIME: CPT

## 2023-07-17 PROCEDURE — 71250 CT THORAX DX C-: CPT

## 2023-07-17 PROCEDURE — 84484 ASSAY OF TROPONIN QUANT: CPT

## 2023-07-17 PROCEDURE — 33990 INSJ PERQ VAD L HRT ARTERIAL: CPT

## 2023-07-17 PROCEDURE — 82803 BLOOD GASES ANY COMBINATION: CPT

## 2023-07-17 PROCEDURE — C1874: CPT

## 2023-07-17 PROCEDURE — 93458 L HRT ARTERY/VENTRICLE ANGIO: CPT

## 2023-07-17 PROCEDURE — 83036 HEMOGLOBIN GLYCOSYLATED A1C: CPT

## 2023-07-17 PROCEDURE — 94640 AIRWAY INHALATION TREATMENT: CPT

## 2023-07-17 PROCEDURE — 93306 TTE W/DOPPLER COMPLETE: CPT

## 2023-07-17 PROCEDURE — 84480 ASSAY TRIIODOTHYRONINE (T3): CPT

## 2023-07-17 PROCEDURE — 84295 ASSAY OF SERUM SODIUM: CPT

## 2023-07-17 PROCEDURE — 99232 SBSQ HOSP IP/OBS MODERATE 35: CPT

## 2023-07-17 PROCEDURE — 85027 COMPLETE CBC AUTOMATED: CPT

## 2023-07-17 PROCEDURE — 71045 X-RAY EXAM CHEST 1 VIEW: CPT

## 2023-07-17 PROCEDURE — 82330 ASSAY OF CALCIUM: CPT

## 2023-07-17 PROCEDURE — 94010 BREATHING CAPACITY TEST: CPT

## 2023-07-17 PROCEDURE — C1894: CPT

## 2023-07-17 PROCEDURE — C8929: CPT

## 2023-07-17 PROCEDURE — 86900 BLOOD TYPING SEROLOGIC ABO: CPT

## 2023-07-17 PROCEDURE — 87640 STAPH A DNA AMP PROBE: CPT

## 2023-07-17 PROCEDURE — C9600: CPT | Mod: LC

## 2023-07-17 PROCEDURE — 85730 THROMBOPLASTIN TIME PARTIAL: CPT

## 2023-07-17 PROCEDURE — 83735 ASSAY OF MAGNESIUM: CPT

## 2023-07-17 PROCEDURE — 97161 PT EVAL LOW COMPLEX 20 MIN: CPT

## 2023-07-17 PROCEDURE — 83880 ASSAY OF NATRIURETIC PEPTIDE: CPT

## 2023-07-17 PROCEDURE — C1725: CPT

## 2023-07-17 PROCEDURE — 84100 ASSAY OF PHOSPHORUS: CPT

## 2023-07-17 PROCEDURE — 84436 ASSAY OF TOTAL THYROXINE: CPT

## 2023-07-17 PROCEDURE — 86850 RBC ANTIBODY SCREEN: CPT

## 2023-07-17 PROCEDURE — 84145 PROCALCITONIN (PCT): CPT

## 2023-07-17 PROCEDURE — 86803 HEPATITIS C AB TEST: CPT

## 2023-07-17 PROCEDURE — 70544 MR ANGIOGRAPHY HEAD W/O DYE: CPT

## 2023-07-17 PROCEDURE — A9585: CPT

## 2023-07-17 PROCEDURE — C1769: CPT

## 2023-07-17 PROCEDURE — 85025 COMPLETE CBC W/AUTO DIFF WBC: CPT

## 2023-07-17 RX ORDER — ATORVASTATIN CALCIUM 80 MG/1
2 TABLET, FILM COATED ORAL
Qty: 60 | Refills: 0
Start: 2023-07-17 | End: 2023-08-15

## 2023-07-17 RX ORDER — INSULIN GLARGINE 100 [IU]/ML
10 INJECTION, SOLUTION SUBCUTANEOUS
Qty: 1 | Refills: 0
Start: 2023-07-17 | End: 2023-08-15

## 2023-07-17 RX ORDER — METFORMIN HYDROCHLORIDE 850 MG/1
1 TABLET ORAL
Refills: 0 | DISCHARGE

## 2023-07-17 RX ORDER — METFORMIN HYDROCHLORIDE 850 MG/1
1 TABLET ORAL
Qty: 60 | Refills: 0
Start: 2023-07-17 | End: 2023-08-15

## 2023-07-17 RX ORDER — DULAGLUTIDE 4.5 MG/.5ML
0.75 INJECTION, SOLUTION SUBCUTANEOUS
Qty: 5 | Refills: 0
Start: 2023-07-17 | End: 2023-08-15

## 2023-07-17 RX ORDER — METOPROLOL TARTRATE 50 MG
50 TABLET ORAL DAILY
Refills: 0 | Status: DISCONTINUED | OUTPATIENT
Start: 2023-07-17 | End: 2023-07-17

## 2023-07-17 RX ADMIN — VALSARTAN 80 MILLIGRAM(S): 80 TABLET ORAL at 06:29

## 2023-07-17 RX ADMIN — Medication 81 MILLIGRAM(S): at 11:29

## 2023-07-17 RX ADMIN — Medication 5 UNIT(S): at 14:01

## 2023-07-17 RX ADMIN — Medication 5 UNIT(S): at 10:00

## 2023-07-17 RX ADMIN — Medication 1: at 14:01

## 2023-07-17 RX ADMIN — CLOPIDOGREL BISULFATE 75 MILLIGRAM(S): 75 TABLET, FILM COATED ORAL at 11:29

## 2023-07-17 RX ADMIN — Medication 50 MILLIGRAM(S): at 11:28

## 2023-07-17 RX ADMIN — HEPARIN SODIUM 5000 UNIT(S): 5000 INJECTION INTRAVENOUS; SUBCUTANEOUS at 06:28

## 2023-07-17 RX ADMIN — SPIRONOLACTONE 25 MILLIGRAM(S): 25 TABLET, FILM COATED ORAL at 06:29

## 2023-07-17 NOTE — DISCHARGE NOTE NURSING/CASE MANAGEMENT/SOCIAL WORK - PATIENT PORTAL LINK FT
You can access the FollowMyHealth Patient Portal offered by [facility] by registering at the following website: [email or web address]. By joining [employer]’s FollowMyHealth portal, you will also be able to view your health information using other applications (apps) compatible with our system.

## 2023-07-17 NOTE — AUDIOLOGICAL ASSESSMENT - COMMENTS
Hx: C/o aural fullness and worsening hearing left ear.    Results: Moderate sloping to severe SNHL bilaterally. Slightly poorer hearing noted in the left ear at 2000 Hz, 3000 Hz and 8000 Hz compared to the right ear.    Recs: 1. Continued follow up with ENT  2. Complete audiological evaluation upon discharge  3. Further recommendations pending above to possibly include binaural HA fitting.

## 2023-07-17 NOTE — DISCHARGE NOTE NURSING/CASE MANAGEMENT/SOCIAL WORK - NSDCCRNAME_GEN_ALL_CORE_FT
[facility] ([address], [place] [address])

## 2023-07-17 NOTE — PROGRESS NOTE ADULT - NSPROGADDITIONALINFOA_GEN_ALL_CORE
-Plan discussed with pt/team.  Contact info: [phone] (24/7). pager [phone]  Amion on [place]  Teams on M-T-W-F. Unavailable Thu/Weekends/Holidays  Provided face to face education as well as assessed  pt/labs/meds and discussed plan with primary team  Adjusting insulin  Discharge plan  Follow up care -Plan discussed with pt/team.  Contact info: [phone] (24/7). pager [phone]  Amion on [place]  Teams on M-T-W-F. Unavailable Thu/Weekends/Holidays  Provided face to face education as well as assessed  pt/labs/meds and discussed discharge plan with primary team/pt and wife.   Adjusting insulin  Discharge plan  Follow up care

## 2023-07-17 NOTE — PROGRESS NOTE ADULT - SUBJECTIVE AND OBJECTIVE BOX
DATE OF SERVICE: 07-17-23 @ 14:14  CHIEF COMPLAINT:Patient is a 73y old  Male who presents with a chief complaint of Cough, abnormal ECG (16 Jul 2023 15:10)    	        PAST MEDICAL & SURGICAL HISTORY:  HTN (hypertension)      DM (diabetes mellitus)              REVIEW OF SYSTEMS:  CONSTITUTIONAL: No fever, weight loss, or fatigue  EYES: No eye pain, visual disturbances, or discharge  NECK: No pain or stiffness  RESPIRATORY: No cough, wheezing, chills or hemoptysis; No Shortness of Breath  CARDIOVASCULAR: No chest pain, palpitations, passing out,   GASTROINTESTINAL: No abdominal or epigastric pain. No nausea, vomiting, or hematemesis; No diarrhea or constipation. No melena or hematochezia.  GENITOURINARY: No dysuria, frequency, hematuria, or incontinence  NEUROLOGICAL: No headaches,     Medications:  MEDICATIONS  (STANDING):  aspirin enteric coated 81 milliGRAM(s) Oral daily  atorvastatin 80 milliGRAM(s) Oral at bedtime  clopidogrel Tablet 75 milliGRAM(s) Oral daily  dextrose 5%. 1000 milliLiter(s) (100 mL/Hr) IV Continuous <Continuous>  dextrose 5%. 1000 milliLiter(s) (50 mL/Hr) IV Continuous <Continuous>  dextrose 50% Injectable 25 Gram(s) IV Push once  dextrose 50% Injectable 12.5 Gram(s) IV Push once  dextrose 50% Injectable 25 Gram(s) IV Push once  glucagon  Injectable 1 milliGRAM(s) IntraMuscular once  heparin   Injectable 5000 Unit(s) SubCutaneous every 12 hours  insulin glargine Injectable (LANTUS) 16 Unit(s) SubCutaneous at bedtime  insulin lispro (ADMELOG) corrective regimen sliding scale   SubCutaneous at bedtime  insulin lispro (ADMELOG) corrective regimen sliding scale   SubCutaneous three times a day before meals  insulin lispro Injectable (ADMELOG) 5 Unit(s) SubCutaneous three times a day before meals  metoprolol succinate ER 50 milliGRAM(s) Oral daily  spironolactone 25 milliGRAM(s) Oral daily  valsartan 80 milliGRAM(s) Oral daily    MEDICATIONS  (PRN):  dextrose Oral Gel 15 Gram(s) Oral once PRN Blood Glucose LESS THAN 70 milliGRAM(s)/deciliter  guaiFENesin Oral Liquid (Sugar-Free) 100 milliGRAM(s) Oral every 6 hours PRN Cough    	    PHYSICAL EXAM:  T(C): 36.7 (07-17-23 @ 09:58), Max: 36.8 (07-16-23 @ 21:05)  HR: 61 (07-17-23 @ 09:58) (57 - 61)  BP: 118/67 (07-17-23 @ 09:58) (118/67 - 130/75)  RR: 18 (07-17-23 @ 09:58) (18 - 18)  SpO2: 95% (07-17-23 @ 09:58) (95% - 97%)  Wt(kg): --  I&O's Summary    16 Jul 2023 07:01  -  17 Jul 2023 07:00  --------------------------------------------------------  IN: 860 mL / OUT: 0 mL / NET: 860 mL    17 Jul 2023 07:01  -  17 Jul 2023 14:14  --------------------------------------------------------  IN: 240 mL / OUT: 0 mL / NET: 240 mL        Appearance: Normal	  HEENT:   Normal oral mucosa, PERRL, EOMI	  Lymphatic: No lymphadenopathy  Cardiovascular: Normal S1 S2, No JVD, No murmurs, No edema  Respiratory: Lungs clear to auscultation	  Psychiatry: A & O x 3, Mood & affect appropriate  Gastrointestinal:  Soft, Non-tender, + BS	  Skin: No rashes, No ecchymoses, No cyanosis	  Neurologic: Non-focal      TELEMETRY: 	    ECG:  	  RADIOLOGY:  OTHER: 	  	  LABS:	 	    CARDIAC MARKERS:                                13.1   6.47  )-----------( 205      ( 16 Jul 2023 14:38 )             39.8     07-16    140  |  104  |  23  ----------------------------<  104<H>  5.2   |  23  |  0.88    Ca    9.8      16 Jul 2023 14:38    TPro  6.8  /  Alb  3.7  /  TBili  0.3  /  DBili  x   /  AST  24  /  ALT  31  /  AlkPhos  120  07-16    proBNP:   Lipid Profile:   HgA1c:   TSH:

## 2023-07-17 NOTE — PROGRESS NOTE ADULT - ASSESSMENT
73M with CAD s/p 3 stents, HTN, T2DM, admitted for pneumonia, cardiology consulted for ST depressions and LVH noted on EKG.      ST Depressions  Hypertensive urgency resolved    -F/u  TTE noted  cath noted  triple vessel   s/p cath w/ pci  cards f/u noted  ep eval to see if loop recorder needed    llext weakness  cts noted  mri done   f/u results noted  mult infarct   c/w asa / plavix    neuro f/u for further recs and d/c clearance  vasc eval noted for mri results    c/w meds    likely pna  bronchitis  s/p abs  nebs  pulm eval  noted    dm  fsg riss  dvt proph      d/c when cleared by neuro /ep

## 2023-07-17 NOTE — PROGRESS NOTE ADULT - PROBLEM SELECTOR PROBLEM 3
Hypertensive urgency
Hypertensive urgency
Hyperlipidemia
Hypertensive urgency
Hypertensive urgency
Hyperlipidemia
Hypertensive urgency
Hyperlipidemia
Hypertensive urgency

## 2023-07-17 NOTE — PROGRESS NOTE ADULT - SUBJECTIVE AND OBJECTIVE BOX
INTERVAL EVENTS/SUBJ:  No events     Home Medications:  metFORMIN 850 mg oral tablet: 1 tab(s) orally 2 times a day (26 Jun 2023 17:58)      MEDICATIONS  (STANDING):  aspirin enteric coated 81 milliGRAM(s) Oral daily  atorvastatin 80 milliGRAM(s) Oral at bedtime  clopidogrel Tablet 75 milliGRAM(s) Oral daily  dextrose 5%. 1000 milliLiter(s) (100 mL/Hr) IV Continuous <Continuous>  dextrose 5%. 1000 milliLiter(s) (50 mL/Hr) IV Continuous <Continuous>  dextrose 50% Injectable 12.5 Gram(s) IV Push once  dextrose 50% Injectable 25 Gram(s) IV Push once  dextrose 50% Injectable 25 Gram(s) IV Push once  glucagon  Injectable 1 milliGRAM(s) IntraMuscular once  heparin   Injectable 5000 Unit(s) SubCutaneous every 12 hours  insulin glargine Injectable (LANTUS) 16 Unit(s) SubCutaneous at bedtime  insulin lispro (ADMELOG) corrective regimen sliding scale   SubCutaneous at bedtime  insulin lispro (ADMELOG) corrective regimen sliding scale   SubCutaneous three times a day before meals  insulin lispro Injectable (ADMELOG) 5 Unit(s) SubCutaneous three times a day before meals  metoprolol succinate ER 50 milliGRAM(s) Oral daily  spironolactone 25 milliGRAM(s) Oral daily  valsartan 80 milliGRAM(s) Oral daily    MEDICATIONS  (PRN):  dextrose Oral Gel 15 Gram(s) Oral once PRN Blood Glucose LESS THAN 70 milliGRAM(s)/deciliter  guaiFENesin Oral Liquid (Sugar-Free) 100 milliGRAM(s) Oral every 6 hours PRN Cough      Vital Signs Last 24 Hrs  T(C): 36.4 (17 Jul 2023 05:41), Max: 37.1 (16 Jul 2023 09:23)  T(F): 97.6 (17 Jul 2023 05:41), Max: 98.7 (16 Jul 2023 09:23)  HR: 57 (17 Jul 2023 05:41) (57 - 66)  BP: 130/75 (17 Jul 2023 05:41) (119/64 - 131/78)  BP(mean): --  RR: 18 (17 Jul 2023 05:41) (18 - 18)  SpO2: 97% (17 Jul 2023 05:41) (95% - 97%)    Parameters below as of 17 Jul 2023 05:41  Patient On (Oxygen Delivery Method): room air        REVIEW OF SYSTEMS:  As per HPI, otherwise unremarkable.     PHYSICAL EXAM:  Constitutional/Appearance: Normal, Well-developed  HEENT:   Normal oral mucosa, no drainage or redness, supple neck  Lymphatic: No lymphadenopathy  Cardiovascular: Normal S1 S2, No edema, II/VI BEST  Respiratory: Lungs clear to auscultation, respirations non-labored  Psychiatry: A & O x 3, appropriate affect.   Gastrointestinal:  Soft, Non-tender, no distention  Skin: No rashes, No ecchymoses, No cyanosis	  Neurologic: Non-focal, Alert and oriented x 3  Extremities: Normal range of motion  Vascular: Peripheral pulses palpable 2+ bilaterally (radial)    LABS:  CBC Full  -  ( 16 Jul 2023 14:38 )  WBC Count : 6.47 K/uL  RBC Count : 4.31 M/uL  Hemoglobin : 13.1 g/dL  Hematocrit : 39.8 %  Platelet Count - Automated : 205 K/uL  Mean Cell Volume : 92.3 fl  Mean Cell Hemoglobin : 30.4 pg  Mean Cell Hemoglobin Concentration : 32.9 gm/dL  Auto Neutrophil # : x  Auto Lymphocyte # : x  Auto Monocyte # : x  Auto Eosinophil # : x  Auto Basophil # : x  Auto Neutrophil % : x  Auto Lymphocyte % : x  Auto Monocyte % : x  Auto Eosinophil % : x  Auto Basophil % : x      07-16    140  |  104  |  23  ----------------------------<  104<H>  5.2   |  23  |  0.88    Ca    9.8      16 Jul 2023 14:38    TPro  6.8  /  Alb  3.7  /  TBili  0.3  /  DBili  x   /  AST  24  /  ALT  31  /  AlkPhos  120  07-16    TTE 7/8/23   1. The left ventricular systolic function is moderately decreased with an ejection fraction of 37 % by Caldwell's method of disks. There areregional wall motion abnormalities No evidence of a thrombus in the left ventricle.   2. Multiple segmental abnormalities exist. See findings.   3. Normal right ventricular cavity size, normal right ventricular wall thickness and normal right ventricular systolic function.   4. Limited TTE to evaluate left ventricular systolic function.   5. Compared to the transthoracic echocardiogram performed on 6/27/2023 findings are similar on today's study. Estimated LVEF was 30-35% on the prior study.    IMPRESSION AND PLAN: 73M w CAD s/p PCI, HTN, DM here w MV CAD s/p high-risk PCI.  -tele  -cont asa and plavix  -cont statin  -cont toprol, valsartan, spironolactone  -outpt rp TTE and switch to entresto if EF remains <40  -outpt rhythm monitor to assess AF burden    ***    Saran Elmore MD, MPhil, [facility]  Cardiologist, [facility]  ; [employer] at [facility]  email: [email or web address] on-service contact/call information, go to [email or web address] and use "[email or web address]" to login.  Outpatient Cardiology appointments, call  [phone] to arrange with a colleague; I do not have outpatient Cardiology clinic.

## 2023-07-17 NOTE — PROGRESS NOTE ADULT - ASSESSMENT
74 yo M w/h/o uncontrolled and untreated T2DM (A1C10%) formerly on Dapagliflozin and Sitagliptin > stopped meds for 4 months PTA because pharmacy in  "[facility]" didn't have them. Stopped Metformin due to large weight lost reported by pt. DM c/b CAD > s/p 3 stents. Also h/o HTN, presents with SOB, found to have acute bronchitis, treated with antibiotic. Patient also found to have EF of 30%, s/p LHC on 6/29 which showed TVD>now s/p  X1 JESS to proxOM and X1 JESS to mRCA via RFA  7/10/23. Endocrinology following for diabetes management. Tolerating POs with BG mostly at goal while on present insulin doses. No hypoglycemia.  Will c/w present insulin doses for now  to keep BG Goal 100-180mg/dl     Pt is uninsured and will need to assess what DM pt can afford. Might benefit from coupons until pt goes back to [facility]. Pt states he is not going back to [facility] for now.   Checking [facility] eligibility- [facility] form faxed today by .  Team was requested to send Rx for Lantus 16 units at HS to  [facility]    As per pt, he was on Sitagliptin 50 mg daily and Dapagliflozin 10 mg daily until 4 months ago in [facility] but pharmacy didn't carry meds.               72 yo M w/h/o uncontrolled and untreated T2DM (A1C10%) formerly on Dapagliflozin and Sitagliptin > stopped meds for 4 months PTA because pharmacy in  "[facility]" didn't have them. Stopped Metformin due to large weight lost reported by pt. DM c/b CAD > s/p 3 stents. Also h/o HTN, presents with SOB, found to have acute bronchitis, treated with antibiotic. Patient also found to have EF of 30%, s/p LHC on 6/29 which showed TVD>now s/p  X1 JESS to proxOM and X1 JESS to mRCA via RFA  7/10/23. Endocrinology following for diabetes management. Tolerating POs with BG mostly at goal while on present insulin doses. No hypoglycemia but received lower Lantus dose last night with BG at goal today. Pt going home today. Will decrease basal insulin to keep BG Goal 100-180mg/dl.    Per primary team [facility] will provide pt with Lantus and Metformin but doesn't cover any SGLT2i. Contacted pharmacy to inquire any other med that can provide pt with cardiorenal protection and per pharmacist pt can have [facility] deliver next week to his home. Spoke to team to order med for pt.      As per pt, he was on Sitagliptin 50 mg daily and Dapagliflozin 10 mg daily until 4 months ago in [facility] but pharmacy didn't carry meds.

## 2023-07-17 NOTE — PROGRESS NOTE ADULT - SUBJECTIVE AND OBJECTIVE BOX
DIABETES FOLLOW UP NOTE: Saw pt earlier today prior to discharge    Chief Complaint: Endocrine consult requested for management of T2DM    INTERVAL HX: Pt stable, reports tolerating POs with BG mostly at goal while on present insulin doses (100s). No hypoglycemia but noted bedtime BG 95. Pt received 50% of Lantus dose last night with BG at goal today. Denies any CP/SOB at time of visit. S/p s/p X1 JESS to proxOM and X1 JESS to mRCA via RFA 7/10. Pt reports LLE weakness and numbness greatly improved. Per primary team going home today.       Review of Systems:  General: As above  Cardiovascular: No chest pain, palpitations  Respiratory: No SOB, no cough  GI: No nausea, vomiting, abdominal pain  Endocrine: No polyuria, polydipsia or S&Sx of hypoglycemia    Allergies    No Known Allergies    Intolerances      MEDICATIONS:  atorvastatin 80 milliGRAM(s) Oral at bedtime  insulin glargine Injectable (LANTUS) 16 Unit(s) SubCutaneous at bedtime  insulin lispro (ADMELOG) corrective regimen sliding scale   SubCutaneous three times a day before meals  insulin lispro (ADMELOG) corrective regimen sliding scale   SubCutaneous at bedtime  insulin lispro Injectable (ADMELOG) 5 Unit(s) SubCutaneous three times a day before meals      PHYSICAL EXAM:  VITALS: T(C): 36.4 (07-17-23 @ 16:15)  T(F): 97.5 (07-17-23 @ 16:15), Max: 98.8 (07-17-23 @ 13:46)  HR: 58 (07-17-23 @ 16:15) (57 - 61)  BP: 157/76 (07-17-23 @ 16:15) (118/67 - 157/76)  RR:  (18 - 18)  SpO2:  (95% - 98%)  Wt(kg): --  GENERAL: Male sitting in chair in NAD. Wife at bedside  Abdomen: Soft, nontender, non distended, central adiposity  Extremities: Warm, no edema in all 4 exts   NEURO: A&O X3. Encounter done in [place]    LABS:  POCT Blood Glucose.: 171 mg/dL (07-17-23 @ 13:48)  POCT Blood Glucose.: 131 mg/dL (07-17-23 @ 09:16)  POCT Blood Glucose.: 95 mg/dL (07-16-23 @ 21:22)  POCT Blood Glucose.: 165 mg/dL (07-16-23 @ 17:28)  POCT Blood Glucose.: 163 mg/dL (07-16-23 @ 12:30)  POCT Blood Glucose.: 153 mg/dL (07-16-23 @ 09:06)  POCT Blood Glucose.: 137 mg/dL (07-15-23 @ 21:51)  POCT Blood Glucose.: 131 mg/dL (07-15-23 @ 17:28)  POCT Blood Glucose.: 101 mg/dL (07-15-23 @ 13:03)  POCT Blood Glucose.: 189 mg/dL (07-15-23 @ 08:56)  POCT Blood Glucose.: 183 mg/dL (07-14-23 @ 22:09)  POCT Blood Glucose.: 160 mg/dL (07-14-23 @ 17:58)                            13.1   6.47  )-----------( 205      ( 16 Jul 2023 14:38 )             39.8       07-16    140  |  104  |  23  ----------------------------<  104<H>  5.2   |  23  |  0.88    eGFR: 90    Ca    9.8      07-16    TPro  6.8  /  Alb  3.7  /  TBili  0.3  /  DBili  x   /  AST  24  /  ALT  31  /  AlkPhos  120  07-16      Thyroid Function Tests:  06-29 @ 19:11 TSH 4.73 FreeT4 -- T3 66 Anti TPO -- Anti Thyroglobulin Ab -- TSI --  06-27 @ 07:41 TSH 5.56 FreeT4 -- T3 -- Anti TPO -- Anti Thyroglobulin Ab -- TSI --      A1C with Estimated Average Glucose Result: 10.0 % (06-27-23 @ 07:41)      Estimated Average Glucose: 240 mg/dL (06-27-23 @ 07:41)        06-27 Chol 183 Direct LDL -- LDL calculated 114<H> HDL 43 Trig 136

## 2023-07-17 NOTE — PROGRESS NOTE ADULT - PROBLEM SELECTOR PROBLEM 1
DM2 (diabetes mellitus, type 2)
Hearing loss
Acute bronchitis
Acute bronchitis
DM2 (diabetes mellitus, type 2)
Acute bronchitis
Acute bronchitis
DM2 (diabetes mellitus, type 2)
DM2 (diabetes mellitus, type 2)
Acute bronchitis
DM2 (diabetes mellitus, type 2)
Hearing loss
Acute bronchitis
DM2 (diabetes mellitus, type 2)
Acute bronchitis

## 2023-07-17 NOTE — PROGRESS NOTE ADULT - PROVIDER SPECIALTY LIST ADULT
Cardiology
ENT
Endocrinology
Internal Medicine
Cardiology
ENT
Internal Medicine
Cardiology
Cardiology
Internal Medicine
Intervent Cardiology
Intervent Cardiology
Pulmonology
Cardiology
Endocrinology
Pulmonology
Endocrinology
Endocrinology
Pulmonology

## 2023-07-17 NOTE — PROGRESS NOTE ADULT - PROBLEM SELECTOR PLAN 3
Currently on Atorvastatin 80 mg daily  Goal LDL < 55  Pt   Continue with high intensity statin given his CAD and HFrEF  Follow up levels as out pt    Assessed pt/labs/meds and discussed plan of care with primary team/pt  Insulin adjustment   Discharge plan  Follow up care    Contact via [employer] Teams during business hours  To reach covering provider access AMION via sunrise tools  For Urgent matters/after-hours/weekends/holidays please page endocrine fellow on call   For nonurgent matters please email [email or web address]    Please note that this patient may be followed by different provider tomorrow.  Notify endocrine 24 hours prior to discharge for final recommendations Currently on Atorvastatin 80 mg daily  Goal LDL < 55  Pt   Continue with high intensity statin given his CAD and HFrEF  Follow up levels as out pt

## 2023-07-17 NOTE — PROGRESS NOTE ADULT - PROBLEM SELECTOR PLAN 1
- Test BGs AC TID and at HS  - C/w Lantus dose to 16 units nightly   - C/w Admelog 5 units before meals, Hold if NPO or not eating  - C/w low admelog correction scale before meals and before bedtime  Discharge  - Will be determine based on insulin requirement, BG trends and oral intake  - Limited options for diabetes medications due to undocumented status with no insurance.   - Basal insulin + oral regimen -SGLT2 ( Farxiga or Jardiance ) given Heart failure  and Metformin  - Pt to fill out for for [facility] for insulin assistance  - if he is not eligible for [facility], not able to get insulin due to cost, then can use coupons until pt is back in [facility]. Spoke to pt he needs to get pharmacy in [facility] that carries meds he is prescribe for.     - Pt needs basal insulin for now. Will use coupon as well at time of discharge  - Make sure pt has glucometer, strips and Lancets, insulin and insulin pens upon discharge  - Follow up at medicine clinic [place]  -Needs optho and cardiac f/u as well - Test BGs AC TID and at HS  - Decrease Lantus dose to 10 units nightly   - C/w Admelog 5 units before meals, Hold if NPO or not eating  - C/w low admelog correction scale before meals and before bedtime  Discharge  - [facility] pt can have Lantus 10 units q hs plus Metformin 1g bid plus Trulicity 0.75mg q week and increase to 1.5mg in 4 weeks.   - Make sure pt has glucometer, strips and Lancets, insulin and insulin pens upon discharge  - Follow up at medicine clinic [place]  -Needs optho and cardiac f/u as well

## 2023-07-17 NOTE — PROGRESS NOTE ADULT - PROBLEM SELECTOR PROBLEM 2
Hypertension
Hypertension
Pulmonary nodule
Hypertension
Hypertension
Pulmonary nodule
Hypertension
Hypertension
Pulmonary nodule
Hypertension
Pulmonary nodule
Pulmonary nodule
Hypertension
Pulmonary nodule

## 2023-08-01 ENCOUNTER — APPOINTMENT (OUTPATIENT)
Dept: CARDIOLOGY | Facility: HOSPITAL | Age: 73
End: 2023-08-01

## 2023-08-01 ENCOUNTER — OUTPATIENT (OUTPATIENT)
Dept: OUTPATIENT SERVICES | Facility: HOSPITAL | Age: 73
LOS: 1 days | End: 2023-08-01
Payer: SELF-PAY

## 2023-08-01 VITALS
SYSTOLIC BLOOD PRESSURE: 135 MMHG | HEART RATE: 60 BPM | BODY MASS INDEX: 23.56 KG/M2 | WEIGHT: 120 LBS | HEIGHT: 60 IN | DIASTOLIC BLOOD PRESSURE: 73 MMHG | OXYGEN SATURATION: 99 %

## 2023-08-01 DIAGNOSIS — I50.1 LEFT VENTRICULAR FAILURE, UNSPECIFIED: ICD-10-CM

## 2023-08-01 DIAGNOSIS — I25.10 ATHEROSCLEROTIC HEART DISEASE OF NATIVE CORONARY ARTERY WITHOUT ANGINA PECTORIS: ICD-10-CM

## 2023-08-01 PROCEDURE — G0463: CPT

## 2023-08-01 PROCEDURE — 93005 ELECTROCARDIOGRAM TRACING: CPT

## 2023-08-01 NOTE — DISCUSSION/SUMMARY
[FreeTextEntry1] : 72 yo M w/ PMH T2DM, HTN, CAD s/p stents now found to have HFrEF and TVD, s/p stents to OM and RCA. 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   #CAD s/p stents w/ TVD  -Aspirin and Plavix  -Atorvastatin 80mg   #HFrEF 30s EF  -Metoprolol 50mg once daily  -Spironolactone 25mg once daily  -Increased to valsartan 160mg, f/u BMP  -F/u echo in November   #Ziomonitor for possible Afib given mulitple CVA  -F/u results, handed in today

## 2023-08-01 NOTE — REASON FOR VISIT
[FreeTextEntry1] : Patient is here s/p CAD w/ recent stents and CVA. Patient denies any chest pain or SOB on exertion. Denies any palpitations but noted last week he felt like there was a tapping sensation on his chest which lasted for a few seconds before going away on its own. Denied any lightheadedness, dizziness. He recently handed in his Ziopatch today. He notes weakness of bilateral LE since his hospitilization. Denies any orthopnea or LE edema or weight gain.

## 2023-08-18 ENCOUNTER — APPOINTMENT (OUTPATIENT)
Dept: INTERNAL MEDICINE | Facility: CLINIC | Age: 73
End: 2023-08-18

## 2023-08-18 LAB
ANION GAP SERPL CALC-SCNC: 12 MMOL/L
BUN SERPL-MCNC: 20 MG/DL
CALCIUM SERPL-MCNC: 10 MG/DL
CHLORIDE SERPL-SCNC: 101 MMOL/L
CO2 SERPL-SCNC: 23 MMOL/L
CREAT SERPL-MCNC: 1.05 MG/DL
EGFR: 74 ML/MIN/1.73M2
GLUCOSE SERPL-MCNC: 125 MG/DL
POTASSIUM SERPL-SCNC: 5.4 MMOL/L
SODIUM SERPL-SCNC: 136 MMOL/L

## 2023-10-12 ENCOUNTER — RESULT REVIEW (OUTPATIENT)
Age: 73
End: 2023-10-12

## 2023-10-12 ENCOUNTER — OUTPATIENT (OUTPATIENT)
Dept: OUTPATIENT SERVICES | Facility: HOSPITAL | Age: 73
LOS: 1 days | End: 2023-10-12
Payer: SELF-PAY

## 2023-10-12 ENCOUNTER — APPOINTMENT (OUTPATIENT)
Dept: INTERNAL MEDICINE | Facility: CLINIC | Age: 73
End: 2023-10-12
Payer: COMMERCIAL

## 2023-10-12 VITALS
BODY MASS INDEX: 26.5 KG/M2 | HEART RATE: 51 BPM | WEIGHT: 135 LBS | HEIGHT: 60 IN | OXYGEN SATURATION: 97 % | DIASTOLIC BLOOD PRESSURE: 72 MMHG | SYSTOLIC BLOOD PRESSURE: 130 MMHG

## 2023-10-12 DIAGNOSIS — I10 ESSENTIAL (PRIMARY) HYPERTENSION: ICD-10-CM

## 2023-10-12 DIAGNOSIS — I25.10 ATHEROSCLEROTIC HEART DISEASE OF NATIVE CORONARY ARTERY W/OUT ANGINA PECTORIS: ICD-10-CM

## 2023-10-12 DIAGNOSIS — E11.9 TYPE 2 DIABETES MELLITUS W/OUT COMPLICATIONS: ICD-10-CM

## 2023-10-12 DIAGNOSIS — M54.50 LOW BACK PAIN, UNSPECIFIED: ICD-10-CM

## 2023-10-12 DIAGNOSIS — E78.5 HYPERLIPIDEMIA, UNSPECIFIED: ICD-10-CM

## 2023-10-12 DIAGNOSIS — Z00.00 ENCOUNTER FOR GENERAL ADULT MEDICAL EXAMINATION W/OUT ABNORMAL FINDINGS: ICD-10-CM

## 2023-10-12 DIAGNOSIS — I50.1 LEFT VENTRICULAR FAILURE, UNSPECIFIED: ICD-10-CM

## 2023-10-12 DIAGNOSIS — Z98.61 ATHEROSCLEROTIC HEART DISEASE OF NATIVE CORONARY ARTERY W/OUT ANGINA PECTORIS: ICD-10-CM

## 2023-10-12 PROCEDURE — 99214 OFFICE O/P EST MOD 30 MIN: CPT | Mod: GC

## 2023-10-12 RX ORDER — METFORMIN HYDROCHLORIDE 1000 MG/1
1000 TABLET, COATED ORAL DAILY
Refills: 0 | Status: ACTIVE | COMMUNITY
Start: 2023-10-12

## 2023-10-12 RX ORDER — SPIRONOLACTONE 25 MG/1
25 TABLET ORAL
Qty: 30 | Refills: 1 | Status: ACTIVE | COMMUNITY
Start: 2023-08-18

## 2023-10-12 RX ORDER — ATORVASTATIN CALCIUM 80 MG/1
80 TABLET, FILM COATED ORAL
Qty: 30 | Refills: 5 | Status: ACTIVE | COMMUNITY
Start: 2023-08-22

## 2023-10-12 RX ORDER — CLOPIDOGREL BISULFATE 75 MG/1
75 TABLET, FILM COATED ORAL DAILY
Qty: 30 | Refills: 1 | Status: ACTIVE | COMMUNITY
Start: 2023-08-22

## 2023-10-12 RX ORDER — METOPROLOL SUCCINATE 25 MG/1
25 TABLET, EXTENDED RELEASE ORAL DAILY
Qty: 60 | Refills: 1 | Status: COMPLETED | COMMUNITY
Start: 2023-08-22 | End: 2023-10-12

## 2023-10-12 RX ORDER — INSULIN GLARGINE 100 [IU]/ML
100 INJECTION, SOLUTION SUBCUTANEOUS
Refills: 0 | Status: ACTIVE | COMMUNITY
Start: 2023-10-12

## 2023-10-12 RX ORDER — VALSARTAN 160 MG/1
160 TABLET, COATED ORAL DAILY
Qty: 30 | Refills: 1 | Status: ACTIVE | COMMUNITY
Start: 2023-08-01

## 2023-10-12 RX ORDER — METOPROLOL SUCCINATE 50 MG/1
50 TABLET, EXTENDED RELEASE ORAL DAILY
Refills: 0 | Status: ACTIVE | COMMUNITY
Start: 2023-10-12

## 2023-10-19 ENCOUNTER — TRANSCRIPTION ENCOUNTER (OUTPATIENT)
Age: 73
End: 2023-10-19

## 2023-10-19 LAB
ALBUMIN SERPL ELPH-MCNC: 4.2 G/DL
ALP BLD-CCNC: 111 U/L
ALT SERPL-CCNC: 24 U/L
ANION GAP SERPL CALC-SCNC: 10 MMOL/L
AST SERPL-CCNC: 19 U/L
BASOPHILS # BLD AUTO: 0.05 K/UL
BASOPHILS NFR BLD AUTO: 0.7 %
BILIRUB SERPL-MCNC: 0.3 MG/DL
BUN SERPL-MCNC: 22 MG/DL
CALCIUM SERPL-MCNC: 10.1 MG/DL
CHLORIDE SERPL-SCNC: 103 MMOL/L
CO2 SERPL-SCNC: 27 MMOL/L
CREAT SERPL-MCNC: 1.02 MG/DL
EGFR: 77 ML/MIN/1.73M2
EOSINOPHIL # BLD AUTO: 0.09 K/UL
EOSINOPHIL NFR BLD AUTO: 1.2 %
ESTIMATED AVERAGE GLUCOSE: 192 MG/DL
GLUCOSE SERPL-MCNC: 163 MG/DL
HBA1C MFR BLD HPLC: 8.3 %
HCT VFR BLD CALC: 42.7 %
HGB BLD-MCNC: 14.3 G/DL
IMM GRANULOCYTES NFR BLD AUTO: 0.4 %
LYMPHOCYTES # BLD AUTO: 2.28 K/UL
LYMPHOCYTES NFR BLD AUTO: 29.9 %
MAN DIFF?: NORMAL
MCHC RBC-ENTMCNC: 30.5 PG
MCHC RBC-ENTMCNC: 33.5 GM/DL
MCV RBC AUTO: 91 FL
MONOCYTES # BLD AUTO: 0.69 K/UL
MONOCYTES NFR BLD AUTO: 9.1 %
NEUTROPHILS # BLD AUTO: 4.48 K/UL
NEUTROPHILS NFR BLD AUTO: 58.7 %
PLATELET # BLD AUTO: 202 K/UL
POTASSIUM SERPL-SCNC: 5 MMOL/L
PROT SERPL-MCNC: 7.2 G/DL
RBC # BLD: 4.69 M/UL
RBC # FLD: 14 %
SODIUM SERPL-SCNC: 140 MMOL/L
VIT B12 SERPL-MCNC: 661 PG/ML
WBC # FLD AUTO: 7.62 K/UL

## 2023-10-19 PROCEDURE — 82607 VITAMIN B-12: CPT

## 2023-10-19 PROCEDURE — 84443 ASSAY THYROID STIM HORMONE: CPT

## 2023-10-19 PROCEDURE — 80061 LIPID PANEL: CPT

## 2023-10-19 PROCEDURE — 85025 COMPLETE CBC W/AUTO DIFF WBC: CPT

## 2023-10-19 PROCEDURE — 80053 COMPREHEN METABOLIC PANEL: CPT

## 2023-10-19 PROCEDURE — 83036 HEMOGLOBIN GLYCOSYLATED A1C: CPT

## 2023-10-19 PROCEDURE — G0463: CPT

## 2023-10-20 ENCOUNTER — OUTPATIENT (OUTPATIENT)
Dept: OUTPATIENT SERVICES | Facility: HOSPITAL | Age: 73
LOS: 1 days | End: 2023-10-20
Payer: COMMERCIAL

## 2023-10-20 ENCOUNTER — APPOINTMENT (OUTPATIENT)
Dept: RADIOLOGY | Facility: CLINIC | Age: 73
End: 2023-10-20
Payer: COMMERCIAL

## 2023-10-20 DIAGNOSIS — M54.50 LOW BACK PAIN, UNSPECIFIED: ICD-10-CM

## 2023-10-20 PROCEDURE — 73502 X-RAY EXAM HIP UNI 2-3 VIEWS: CPT | Mod: 26,LT

## 2023-10-20 PROCEDURE — 72100 X-RAY EXAM L-S SPINE 2/3 VWS: CPT | Mod: 26

## 2023-10-20 PROCEDURE — 73502 X-RAY EXAM HIP UNI 2-3 VIEWS: CPT

## 2023-10-20 PROCEDURE — 72100 X-RAY EXAM L-S SPINE 2/3 VWS: CPT

## 2023-10-23 LAB
CHOLEST SERPL-MCNC: 188 MG/DL
HDLC SERPL-MCNC: 62 MG/DL
LDLC SERPL CALC-MCNC: 102 MG/DL
NONHDLC SERPL-MCNC: 126 MG/DL
TRIGL SERPL-MCNC: 137 MG/DL
TSH SERPL-ACNC: 10.1 UIU/ML

## 2023-10-25 DIAGNOSIS — Z86.79 PERSONAL HISTORY OF OTHER DISEASES OF THE CIRCULATORY SYSTEM: ICD-10-CM

## 2023-10-25 DIAGNOSIS — Z00.00 ENCOUNTER FOR GENERAL ADULT MEDICAL EXAMINATION WITHOUT ABNORMAL FINDINGS: ICD-10-CM

## 2023-10-25 DIAGNOSIS — I25.10 ATHEROSCLEROTIC HEART DISEASE OF NATIVE CORONARY ARTERY WITHOUT ANGINA PECTORIS: ICD-10-CM

## 2023-10-25 DIAGNOSIS — M54.50 LOW BACK PAIN, UNSPECIFIED: ICD-10-CM

## 2023-10-25 DIAGNOSIS — Z86.39 PERSONAL HISTORY OF OTHER ENDOCRINE, NUTRITIONAL AND METABOLIC DISEASE: ICD-10-CM

## 2023-10-25 DIAGNOSIS — I50.1 LEFT VENTRICULAR FAILURE, UNSPECIFIED: ICD-10-CM

## 2023-10-25 DIAGNOSIS — Z98.61 CORONARY ANGIOPLASTY STATUS: ICD-10-CM

## 2023-10-25 DIAGNOSIS — E11.9 TYPE 2 DIABETES MELLITUS WITHOUT COMPLICATIONS: ICD-10-CM

## 2023-10-25 DIAGNOSIS — E78.5 HYPERLIPIDEMIA, UNSPECIFIED: ICD-10-CM

## 2023-11-13 ENCOUNTER — APPOINTMENT (OUTPATIENT)
Dept: CV DIAGNOSITCS | Facility: HOSPITAL | Age: 73
End: 2023-11-13

## 2023-11-13 ENCOUNTER — OUTPATIENT (OUTPATIENT)
Dept: OUTPATIENT SERVICES | Facility: HOSPITAL | Age: 73
LOS: 1 days | End: 2023-11-13
Payer: SELF-PAY

## 2023-11-13 DIAGNOSIS — Z00.00 ENCOUNTER FOR GENERAL ADULT MEDICAL EXAMINATION WITHOUT ABNORMAL FINDINGS: ICD-10-CM

## 2023-11-13 DIAGNOSIS — E78.5 HYPERLIPIDEMIA, UNSPECIFIED: ICD-10-CM

## 2023-11-13 DIAGNOSIS — Z86.79 PERSONAL HISTORY OF OTHER DISEASES OF THE CIRCULATORY SYSTEM: ICD-10-CM

## 2023-11-13 DIAGNOSIS — E11.9 TYPE 2 DIABETES MELLITUS WITHOUT COMPLICATIONS: ICD-10-CM

## 2023-11-13 DIAGNOSIS — I10 ESSENTIAL (PRIMARY) HYPERTENSION: ICD-10-CM

## 2023-11-13 DIAGNOSIS — M54.50 LOW BACK PAIN, UNSPECIFIED: ICD-10-CM

## 2023-11-13 DIAGNOSIS — I50.1 LEFT VENTRICULAR FAILURE, UNSPECIFIED: ICD-10-CM

## 2023-11-13 DIAGNOSIS — I25.10 ATHEROSCLEROTIC HEART DISEASE OF NATIVE CORONARY ARTERY WITHOUT ANGINA PECTORIS: ICD-10-CM

## 2023-11-13 DIAGNOSIS — Z98.61 CORONARY ANGIOPLASTY STATUS: ICD-10-CM

## 2023-11-13 DIAGNOSIS — Z86.39 PERSONAL HISTORY OF OTHER ENDOCRINE, NUTRITIONAL AND METABOLIC DISEASE: ICD-10-CM

## 2023-11-13 PROCEDURE — 93306 TTE W/DOPPLER COMPLETE: CPT | Mod: 26

## 2023-11-13 PROCEDURE — C8929: CPT

## 2023-11-14 ENCOUNTER — OUTPATIENT (OUTPATIENT)
Dept: OUTPATIENT SERVICES | Facility: HOSPITAL | Age: 73
LOS: 1 days | End: 2023-11-14
Payer: SELF-PAY

## 2023-11-14 ENCOUNTER — NON-APPOINTMENT (OUTPATIENT)
Age: 73
End: 2023-11-14

## 2023-11-14 ENCOUNTER — APPOINTMENT (OUTPATIENT)
Dept: CARDIOLOGY | Facility: HOSPITAL | Age: 73
End: 2023-11-14

## 2023-11-14 VITALS
SYSTOLIC BLOOD PRESSURE: 154 MMHG | HEART RATE: 61 BPM | OXYGEN SATURATION: 97 % | HEIGHT: 60 IN | DIASTOLIC BLOOD PRESSURE: 54 MMHG

## 2023-11-14 DIAGNOSIS — I25.10 ATHEROSCLEROTIC HEART DISEASE OF NATIVE CORONARY ARTERY WITHOUT ANGINA PECTORIS: ICD-10-CM

## 2023-11-14 PROCEDURE — G0463: CPT

## 2023-11-14 RX ORDER — AMLODIPINE BESYLATE 5 MG/1
5 TABLET ORAL DAILY
Qty: 90 | Refills: 1 | Status: ACTIVE | COMMUNITY
Start: 2023-11-14 | End: 1900-01-01

## 2023-11-15 ENCOUNTER — APPOINTMENT (OUTPATIENT)
Dept: INTERNAL MEDICINE | Facility: CLINIC | Age: 73
End: 2023-11-15

## 2024-04-06 NOTE — ED ADULT NURSE NOTE - SKIN TEMPERATURE MOISTURE
You can access the FollowMyHealth Patient Portal offered by [facility] by registering at the following website: [email or web address]. By joining [facility]’s FollowMyHealth portal, you will also be able to view your health information using other applications (apps) compatible with our system.
warm

## 2024-07-26 NOTE — PROGRESS NOTE ADULT - ASSESSMENT
POC discussed with patient, verbalized understanding. Patient with uneventful night, slept off and on between care. VS stable. Up to bathroom multiple times to void. Medicated X 1 with Hydrocodone for complaints of abdominal discomfort. . 02 @ 2L/NC, as worn at home as needed. Tolerating diet. Monitoring accuchecks, no coverage received. IVF infusing. Call light at bedside, bed alarm in use.    74 y/o mostly Occitan speaking M with PMH of DM, HTN, CAD s/p 3 stents. Presents with cough for the past 2 weeks. CXR with small L basilar opacity. Also noted to be in hypertensive urgency in ED.

## 2025-05-14 NOTE — DIETITIAN INITIAL EVALUATION ADULT - OBTAIN WEEKLY WEIGHT
PC to pt to schedule Medical Clearance for upcoming procedure on 6/13/25 with Dr. TAYLOR Shaffer.    Left VM to return call to office.    (Clearance paperwork at  in folder)    yes